# Patient Record
Sex: FEMALE | Race: WHITE | NOT HISPANIC OR LATINO | Employment: OTHER | URBAN - METROPOLITAN AREA
[De-identification: names, ages, dates, MRNs, and addresses within clinical notes are randomized per-mention and may not be internally consistent; named-entity substitution may affect disease eponyms.]

---

## 2019-07-18 ENCOUNTER — OFFICE VISIT (OUTPATIENT)
Dept: OBGYN CLINIC | Facility: CLINIC | Age: 79
End: 2019-07-18
Payer: COMMERCIAL

## 2019-07-18 VITALS
WEIGHT: 155 LBS | DIASTOLIC BLOOD PRESSURE: 82 MMHG | SYSTOLIC BLOOD PRESSURE: 140 MMHG | BODY MASS INDEX: 25.83 KG/M2 | HEIGHT: 65 IN

## 2019-07-18 DIAGNOSIS — N95.2 VAGINAL ATROPHY: ICD-10-CM

## 2019-07-18 DIAGNOSIS — N81.2 SECOND DEGREE UTERINE PROLAPSE: ICD-10-CM

## 2019-07-18 DIAGNOSIS — N81.4 CYSTOCELE WITH PROLAPSE: ICD-10-CM

## 2019-07-18 DIAGNOSIS — Z01.419 ENCOUNTER FOR ANNUAL ROUTINE GYNECOLOGICAL EXAMINATION: Primary | ICD-10-CM

## 2019-07-18 PROCEDURE — 99203 OFFICE O/P NEW LOW 30 MIN: CPT | Performed by: OBSTETRICS & GYNECOLOGY

## 2019-07-18 RX ORDER — FELODIPINE 10 MG/1
10 TABLET, EXTENDED RELEASE ORAL DAILY
Refills: 3 | COMMUNITY
Start: 2019-05-31

## 2019-07-18 RX ORDER — SIMVASTATIN 40 MG
40 TABLET ORAL DAILY
Refills: 3 | COMMUNITY
Start: 2019-04-27

## 2019-07-18 RX ORDER — LISINOPRIL AND HYDROCHLOROTHIAZIDE 25; 20 MG/1; MG/1
1 TABLET ORAL DAILY
Refills: 3 | COMMUNITY
Start: 2019-05-31

## 2019-07-18 NOTE — PROGRESS NOTES
The patient is here for a problem  The patient thinks that her bladder may be dropping  The patient says it began over a year ago  No urinary issues  No bleeding or cramping

## 2019-07-18 NOTE — PROGRESS NOTES
Assessment/Plan:  1  Encounter for annual routine gynecological examination  -normal exam     2  Vaginal atrophy  -continue to monitor     3  Second degree uterine prolapse  -will continue to monitor    -patient to do kegel exercises   -avoid heavy lifting   -consider surgery if symptoms worsen     4  Cystocele with prolapse  -kegel exercises         Subjective:      Patient ID: Ricci Arnett is a 70 y o  female  HPI    70year old  female with history of miscarriage with D&C who presents for feeling like "things have dropped"  She does not recall when the symptoms first started but states its been a while  She said it has worsened over time  She denies any urinary leakage with coughing, laughing etc  She empties her bladder often and drinks water more frequently  She does notice some yellow discharge on panty liner  She denies every doing kegel exercises and has not heard of them  She denies any vaginal bleeding, pelvic pain, dysuria, hematuria, frequency, urgency, abdominal pain, bowel/bladder problems or fever/chills  Patient does not get regular gynecological exams  She denies any prior HRT but was one OCP pills with no side effects years ago  She had  with no complications and babies full term  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  Patient is sexually active but does not have any pain/discomfort  Patient had colonoscopy years ago and does recall when  No family history of colon cancer or polyps  Review of Systems    As seen above     Objective:      /82 (BP Location: Left arm, Patient Position: Sitting, Cuff Size: Standard)   Ht 5' 4 96" (1 65 m)   Wt 70 3 kg (155 lb)   LMP  (LMP Unknown)   BMI 25 82 kg/m²          Physical Exam   Constitutional: She is oriented to person, place, and time  She appears well-developed and well-nourished  HENT:   Head: Normocephalic and atraumatic  Neck: Normal range of motion  Neck supple   No tracheal deviation present  No thyromegaly present  Cardiovascular: Normal rate, regular rhythm and normal heart sounds  Pulmonary/Chest: Effort normal and breath sounds normal  No stridor  No respiratory distress  She has no wheezes  She has no rales  She exhibits no tenderness  Right breast exhibits no inverted nipple, no mass, no nipple discharge, no skin change and no tenderness  Left breast exhibits no inverted nipple, no mass, no nipple discharge, no skin change and no tenderness  No breast swelling, tenderness, discharge or bleeding  Breasts are symmetrical    Abdominal: Soft  Bowel sounds are normal  She exhibits no distension and no mass  There is no tenderness  There is no rebound and no guarding  No hernia  Hernia confirmed negative in the right inguinal area and confirmed negative in the left inguinal area  Genitourinary: Vagina normal and uterus normal  Rectal exam shows no external hemorrhoid, no internal hemorrhoid, no fissure and no mass  No breast swelling, tenderness, discharge or bleeding  No labial fusion  There is no rash, tenderness, lesion or injury on the right labia  There is no rash, tenderness, lesion or injury on the left labia  Uterus is not deviated, not enlarged, not fixed and not tender  Cervix exhibits no motion tenderness, no discharge and no friability  Right adnexum displays no mass, no tenderness and no fullness  Left adnexum displays no mass, no tenderness and no fullness  No erythema, tenderness or bleeding in the vagina  No foreign body in the vagina  No signs of injury around the vagina  No vaginal discharge found  Genitourinary Comments: Second degree uterine prolapse  Cystocele    Lymphadenopathy: No inguinal adenopathy noted on the right or left side  Neurological: She is alert and oriented to person, place, and time  Skin: Skin is warm and dry  Psychiatric: She has a normal mood and affect   Her behavior is normal  Judgment and thought content normal

## 2019-10-23 ENCOUNTER — OFFICE VISIT (OUTPATIENT)
Dept: OBGYN CLINIC | Facility: CLINIC | Age: 79
End: 2019-10-23
Payer: COMMERCIAL

## 2019-10-23 DIAGNOSIS — N81.4 CYSTOCELE WITH UTERINE PROLAPSE: ICD-10-CM

## 2019-10-23 DIAGNOSIS — N95.2 VAGINAL ATROPHY: Primary | ICD-10-CM

## 2019-10-23 DIAGNOSIS — R10.2 PELVIC PRESSURE IN FEMALE: ICD-10-CM

## 2019-10-23 PROCEDURE — 99214 OFFICE O/P EST MOD 30 MIN: CPT | Performed by: OBSTETRICS & GYNECOLOGY

## 2019-10-23 NOTE — PROGRESS NOTES
Returns for evaluation for second degree uterine prolapse diagnosed in 7/2019  Despite regular Kegel exercises, no improvement in symtpoms    Impression:  Second degree uterine prolapse    grade II cystocele    Plan:  Discussed options  Observation vs  vaginal hysterectomy with anterior repair vs  pessary  Patient is not interested in observation or pessary and would like to proceed with surgery  Information on hysterectomy given     Surgery discussed  Consent signed for vaginal hysterectomy anterior repair

## 2019-11-22 ENCOUNTER — PREP FOR PROCEDURE (OUTPATIENT)
Dept: OBGYN CLINIC | Facility: CLINIC | Age: 79
End: 2019-11-22

## 2019-11-22 DIAGNOSIS — N81.10 BADEN-WALKER GRADE 2 CYSTOCELE: ICD-10-CM

## 2019-11-22 DIAGNOSIS — N81.2 SECOND DEGREE UTERINE PROLAPSE: Primary | ICD-10-CM

## 2019-12-27 ENCOUNTER — OFFICE VISIT (OUTPATIENT)
Dept: LAB | Facility: HOSPITAL | Age: 79
End: 2019-12-27
Attending: OBSTETRICS & GYNECOLOGY
Payer: COMMERCIAL

## 2019-12-27 ENCOUNTER — ANESTHESIA EVENT (OUTPATIENT)
Dept: PERIOP | Facility: HOSPITAL | Age: 79
End: 2019-12-27
Payer: COMMERCIAL

## 2019-12-27 DIAGNOSIS — N81.2 SECOND DEGREE UTERINE PROLAPSE: ICD-10-CM

## 2019-12-27 DIAGNOSIS — N81.10 BADEN-WALKER GRADE 2 CYSTOCELE: ICD-10-CM

## 2019-12-27 LAB
ABO GROUP BLD: NORMAL
BASOPHILS # BLD AUTO: 0.03 THOUSANDS/ΜL (ref 0–0.1)
BASOPHILS NFR BLD AUTO: 1 % (ref 0–1)
BLD GP AB SCN SERPL QL: NEGATIVE
EOSINOPHIL # BLD AUTO: 0.07 THOUSAND/ΜL (ref 0–0.61)
EOSINOPHIL NFR BLD AUTO: 1 % (ref 0–6)
ERYTHROCYTE [DISTWIDTH] IN BLOOD BY AUTOMATED COUNT: 13 % (ref 11.6–15.1)
EST. AVERAGE GLUCOSE BLD GHB EST-MCNC: 117 MG/DL
HBA1C MFR BLD: 5.7 % (ref 4.2–6.3)
HCT VFR BLD AUTO: 43.6 % (ref 34.8–46.1)
HGB BLD-MCNC: 14.7 G/DL (ref 11.5–15.4)
IMM GRANULOCYTES # BLD AUTO: 0.02 THOUSAND/UL (ref 0–0.2)
IMM GRANULOCYTES NFR BLD AUTO: 0 % (ref 0–2)
LYMPHOCYTES # BLD AUTO: 1.61 THOUSANDS/ΜL (ref 0.6–4.47)
LYMPHOCYTES NFR BLD AUTO: 28 % (ref 14–44)
MCH RBC QN AUTO: 31.1 PG (ref 26.8–34.3)
MCHC RBC AUTO-ENTMCNC: 33.7 G/DL (ref 31.4–37.4)
MCV RBC AUTO: 92 FL (ref 82–98)
MONOCYTES # BLD AUTO: 0.44 THOUSAND/ΜL (ref 0.17–1.22)
MONOCYTES NFR BLD AUTO: 8 % (ref 4–12)
NEUTROPHILS # BLD AUTO: 3.58 THOUSANDS/ΜL (ref 1.85–7.62)
NEUTS SEG NFR BLD AUTO: 62 % (ref 43–75)
NRBC BLD AUTO-RTO: 0 /100 WBCS
PLATELET # BLD AUTO: 134 THOUSANDS/UL (ref 149–390)
PMV BLD AUTO: 11.3 FL (ref 8.9–12.7)
RBC # BLD AUTO: 4.73 MILLION/UL (ref 3.81–5.12)
RH BLD: POSITIVE
SPECIMEN EXPIRATION DATE: NORMAL
WBC # BLD AUTO: 5.75 THOUSAND/UL (ref 4.31–10.16)

## 2019-12-27 PROCEDURE — 85025 COMPLETE CBC W/AUTO DIFF WBC: CPT

## 2019-12-27 PROCEDURE — 86901 BLOOD TYPING SEROLOGIC RH(D): CPT

## 2019-12-27 PROCEDURE — 86900 BLOOD TYPING SEROLOGIC ABO: CPT

## 2019-12-27 PROCEDURE — 86850 RBC ANTIBODY SCREEN: CPT

## 2019-12-27 PROCEDURE — 93005 ELECTROCARDIOGRAM TRACING: CPT

## 2019-12-27 PROCEDURE — 36415 COLL VENOUS BLD VENIPUNCTURE: CPT

## 2019-12-27 PROCEDURE — 83036 HEMOGLOBIN GLYCOSYLATED A1C: CPT

## 2019-12-27 NOTE — PRE-PROCEDURE INSTRUCTIONS
Pre-Surgery Instructions:   Medication Instructions    felodipine (PLENDIL) 10 MG 24 hr tablet Instructed patient per Anesthesia Guidelines   lisinopril-hydrochlorothiazide (PRINZIDE,ZESTORETIC) 20-25 MG per tablet Instructed patient per Anesthesia Guidelines   Propylene Glycol (SYSTANE BALANCE) 0 6 % SOLN Instructed patient per Anesthesia Guidelines   simvastatin (ZOCOR) 40 mg tablet Instructed patient per Anesthesia Guidelines  Pt verbalized understanding of Ensure carb drinks, shower instructions and IS instructions  Pt instructed to stop NSAIDS and supplements one week prior to surgery  Pt instructed to take plendil only on day of surgery with 1-2 sips of water

## 2019-12-27 NOTE — PRE-PROCEDURE INSTRUCTIONS
Pre-Surgery Instructions:   Medication Instructions    felodipine (PLENDIL) 10 MG 24 hr tablet Instructed patient per Anesthesia Guidelines   lisinopril-hydrochlorothiazide (PRINZIDE,ZESTORETIC) 20-25 MG per tablet Instructed patient per Anesthesia Guidelines   Propylene Glycol (SYSTANE BALANCE) 0 6 % SOLN Instructed patient per Anesthesia Guidelines   simvastatin (ZOCOR) 40 mg tablet Instructed patient per Anesthesia Guidelines

## 2019-12-30 LAB
ATRIAL RATE: 79 BPM
P AXIS: 62 DEGREES
PR INTERVAL: 160 MS
QRS AXIS: -1 DEGREES
QRSD INTERVAL: 78 MS
QT INTERVAL: 382 MS
QTC INTERVAL: 438 MS
T WAVE AXIS: 46 DEGREES
VENTRICULAR RATE: 79 BPM

## 2019-12-30 PROCEDURE — 93010 ELECTROCARDIOGRAM REPORT: CPT | Performed by: INTERNAL MEDICINE

## 2020-01-02 ENCOUNTER — OFFICE VISIT (OUTPATIENT)
Dept: OBGYN CLINIC | Facility: CLINIC | Age: 80
End: 2020-01-02

## 2020-01-02 VITALS
DIASTOLIC BLOOD PRESSURE: 80 MMHG | SYSTOLIC BLOOD PRESSURE: 140 MMHG | WEIGHT: 163 LBS | HEIGHT: 66 IN | BODY MASS INDEX: 26.2 KG/M2

## 2020-01-02 DIAGNOSIS — Z01.818 PREOP EXAMINATION: Primary | ICD-10-CM

## 2020-01-02 PROCEDURE — PREOP: Performed by: OBSTETRICS & GYNECOLOGY

## 2020-01-02 RX ORDER — ESTRADIOL 0.1 MG/G
1 CREAM VAGINAL DAILY
Qty: 45 G | Refills: 0 | Status: SHIPPED | OUTPATIENT
Start: 2020-01-02 | End: 2020-03-02 | Stop reason: ALTCHOICE

## 2020-01-02 NOTE — PROGRESS NOTES
Patient here for preop visit accompanied by her  today  Patient did her hospital preop visit  She had her blood drawn and EKG done  She has a preop clearance visit with her PMD this week  Surgery was explained in detail risks and complications discussed, questions answered  Patient was given a prescription for 1 g estrogen vaginal cream HS for 20 days preop  Impression secondary uterine prolapse with grade 2 cystocele  Pelvic pressure      Plan:  Vaginal hysterectomy, anterior bladder repair

## 2020-01-02 NOTE — PROGRESS NOTES
The patient is here for a pre-op for a TVH scheduled on  1/21/2020  No bleeding or cramping  The patient is not having any issues

## 2020-01-18 PROCEDURE — 99024 POSTOP FOLLOW-UP VISIT: CPT | Performed by: OBSTETRICS & GYNECOLOGY

## 2020-01-18 NOTE — H&P
H&P Exam - Gynecology   Елена Cuba 70 y o  female MRN: [id number]  Unit/Bed#:  Encounter: [id number]      Assessment:  1  Second-degree uterine prolapse 2  Grade 2 cystocele 3  Pelvic pressure     Plan:  1  Vaginal hysterectomy 2  Anterior bladder repair      HPI:  Елена Cuba is a 70 y o   female who presents with increasing pelvic pressure and dyspareunia  She was diagnosed with a second-degree uterine prolapse and a moderate cystocele 2019  At that time she was instructed to perform Kegel's which she did with no results  She was offered a ring pessary but opted for surgical treatment  She denies any bowel problems  She denies any UMESH  Spontaneous vaginal delivery x2 at term  On physical exam the uterus is not enlarged and there are no adnexal masses  Review of Systems   Constitutional: Negative  HENT: Negative  Respiratory: Negative  Cardiovascular: Negative  Gastrointestinal: Negative  Genitourinary:        Pelvic pressure, dyspareunia   Skin: Negative  Allergic/Immunologic: Negative  Neurological: Negative  Psychiatric/Behavioral: Negative  Historical Information   Past Medical History:   Diagnosis Date    Hypertension      Past Surgical History:   Procedure Laterality Date    COLONOSCOPY      DILATION AND CURETTAGE OF UTERUS      WRIST FRACTURE SURGERY Right      OB/GYN History:    X 2    History reviewed  No pertinent family history    Social History   Social History     Substance and Sexual Activity   Alcohol Use Yes    Alcohol/week: 3 0 standard drinks    Types: 3 Glasses of wine per week    Frequency: 2-3 times a week    Drinks per session: 1 or 2    Binge frequency: Never     Social History     Substance and Sexual Activity   Drug Use Not on file     Social History     Tobacco Use   Smoking Status Never Smoker   Smokeless Tobacco Never Used       Meds/Allergies   all current active meds have been reviewed  Allergies   Allergen Reactions    Sulfa Antibiotics        Objective   Vitals: There were no vitals taken for this visit  No intake or output data in the 24 hours ending 01/18/20 0909    Invasive Devices: Invasive Devices     None                 Physical Exam   Constitutional: She appears well-developed  Neck: Normal range of motion  Cardiovascular: Normal rate, regular rhythm, normal heart sounds and intact distal pulses  Pulmonary/Chest: Breath sounds normal    Abdominal: Soft  Bowel sounds are normal    Genitourinary: Uterus normal  No labial fusion  There is no rash, tenderness, lesion or injury on the right labia  There is no rash, tenderness, lesion or injury on the left labia  Genitourinary Comments: Cervix no lesions  Second-degree uterine prolapse  Uterus normal in size  Grade 2 cystocele  No adnexal masses  No rectocele  Musculoskeletal: Normal range of motion  Neurological: She is alert  Skin: Skin is warm  Psychiatric: She has a normal mood and affect  Her behavior is normal  Judgment and thought content normal        Lab Results: I have personally reviewed pertinent reports  Imaging: I have personally reviewed pertinent reports  EKG, Pathology, and Other Studies: I have personally reviewed pertinent reports  Cameron Osei

## 2020-01-20 NOTE — ANESTHESIA PREPROCEDURE EVALUATION
Review of Systems/Medical History  Patient summary reviewed  Chart reviewed  No history of anesthetic complications     Cardiovascular  EKG reviewed, Exercise tolerance (METS): >4,  Hypertension ,    Pulmonary  Negative pulmonary ROS        GI/Hepatic  Negative GI/hepatic ROS          Negative  ROS        Endo/Other  Negative endo/other ROS      GYN      Comment: Uterine prolapse     Hematology  Negative hematology ROS      Musculoskeletal  Negative musculoskeletal ROS        Neurology  Negative neurology ROS      Psychology   Negative psychology ROS              Physical Exam    Airway    Mallampati score: II  TM Distance: >3 FB  Neck ROM: full     Dental   No notable dental hx     Cardiovascular      Pulmonary      Other Findings        Anesthesia Plan  ASA Score- 2     Anesthesia Type- general with ASA Monitors  Additional Monitors:   Airway Plan: ETT  Plan Factors-  Patient did not smoke on day of surgery  Induction- intravenous  Postoperative Plan- Plan for postoperative opioid use  Informed Consent- Anesthetic plan and risks discussed with patient  I personally reviewed this patient with the CRNA  Discussed and agreed on the Anesthesia Plan with the CRNA  Humberto Resendez

## 2020-01-21 ENCOUNTER — ANESTHESIA (OUTPATIENT)
Dept: PERIOP | Facility: HOSPITAL | Age: 80
End: 2020-01-21
Payer: COMMERCIAL

## 2020-01-21 ENCOUNTER — HOSPITAL ENCOUNTER (OUTPATIENT)
Facility: HOSPITAL | Age: 80
Setting detail: OUTPATIENT SURGERY
Discharge: HOME | End: 2020-01-22
Attending: OBSTETRICS & GYNECOLOGY | Admitting: OBSTETRICS & GYNECOLOGY
Payer: COMMERCIAL

## 2020-01-21 DIAGNOSIS — N81.2 SECOND DEGREE UTERINE PROLAPSE: ICD-10-CM

## 2020-01-21 DIAGNOSIS — N81.10 BADEN-WALKER GRADE 2 CYSTOCELE: ICD-10-CM

## 2020-01-21 PROBLEM — Z90.710 STATUS POST VAGINAL HYSTERECTOMY: Status: ACTIVE | Noted: 2020-01-21

## 2020-01-21 LAB
ABO GROUP BLD: NORMAL
ANION GAP SERPL CALCULATED.3IONS-SCNC: 6 MMOL/L (ref 4–13)
BUN SERPL-MCNC: 17 MG/DL (ref 5–25)
CALCIUM SERPL-MCNC: 9 MG/DL (ref 8.3–10.1)
CHLORIDE SERPL-SCNC: 105 MMOL/L (ref 100–108)
CO2 SERPL-SCNC: 29 MMOL/L (ref 21–32)
CREAT SERPL-MCNC: 1.11 MG/DL (ref 0.6–1.3)
GFR SERPL CREATININE-BSD FRML MDRD: 48 ML/MIN/1.73SQ M
GLUCOSE P FAST SERPL-MCNC: 198 MG/DL (ref 65–99)
GLUCOSE SERPL-MCNC: 106 MG/DL (ref 65–140)
GLUCOSE SERPL-MCNC: 198 MG/DL (ref 65–140)
POTASSIUM SERPL-SCNC: 3.1 MMOL/L (ref 3.5–5.3)
RH BLD: POSITIVE
SODIUM SERPL-SCNC: 140 MMOL/L (ref 136–145)

## 2020-01-21 PROCEDURE — 86901 BLOOD TYPING SEROLOGIC RH(D): CPT | Performed by: OBSTETRICS & GYNECOLOGY

## 2020-01-21 PROCEDURE — 88305 TISSUE EXAM BY PATHOLOGIST: CPT | Performed by: PATHOLOGY

## 2020-01-21 PROCEDURE — 82948 REAGENT STRIP/BLOOD GLUCOSE: CPT

## 2020-01-21 PROCEDURE — 86900 BLOOD TYPING SEROLOGIC ABO: CPT | Performed by: OBSTETRICS & GYNECOLOGY

## 2020-01-21 PROCEDURE — 80048 BASIC METABOLIC PNL TOTAL CA: CPT | Performed by: STUDENT IN AN ORGANIZED HEALTH CARE EDUCATION/TRAINING PROGRAM

## 2020-01-21 PROCEDURE — 58262 VAG HYST INCLUDING T/O: CPT | Performed by: OBSTETRICS & GYNECOLOGY

## 2020-01-21 PROCEDURE — 57240 ANTERIOR COLPORRHAPHY: CPT | Performed by: OBSTETRICS & GYNECOLOGY

## 2020-01-21 RX ORDER — ONDANSETRON 2 MG/ML
INJECTION INTRAMUSCULAR; INTRAVENOUS AS NEEDED
Status: DISCONTINUED | OUTPATIENT
Start: 2020-01-21 | End: 2020-01-21 | Stop reason: SURG

## 2020-01-21 RX ORDER — OXYCODONE HYDROCHLORIDE 5 MG/1
5 TABLET ORAL EVERY 4 HOURS PRN
Status: DISCONTINUED | OUTPATIENT
Start: 2020-01-21 | End: 2020-01-22

## 2020-01-21 RX ORDER — ACETAMINOPHEN 325 MG/1
975 TABLET ORAL ONCE
Status: COMPLETED | OUTPATIENT
Start: 2020-01-21 | End: 2020-01-21

## 2020-01-21 RX ORDER — PROPOFOL 10 MG/ML
INJECTION, EMULSION INTRAVENOUS AS NEEDED
Status: DISCONTINUED | OUTPATIENT
Start: 2020-01-21 | End: 2020-01-21 | Stop reason: SURG

## 2020-01-21 RX ORDER — EPHEDRINE SULFATE 50 MG/ML
INJECTION INTRAVENOUS AS NEEDED
Status: DISCONTINUED | OUTPATIENT
Start: 2020-01-21 | End: 2020-01-21 | Stop reason: SURG

## 2020-01-21 RX ORDER — LIDOCAINE HYDROCHLORIDE 10 MG/ML
0.5 INJECTION, SOLUTION EPIDURAL; INFILTRATION; INTRACAUDAL; PERINEURAL ONCE AS NEEDED
Status: DISCONTINUED | OUTPATIENT
Start: 2020-01-21 | End: 2020-01-21 | Stop reason: HOSPADM

## 2020-01-21 RX ORDER — FENTANYL CITRATE/PF 50 MCG/ML
25 SYRINGE (ML) INJECTION
Status: DISCONTINUED | OUTPATIENT
Start: 2020-01-21 | End: 2020-01-21 | Stop reason: HOSPADM

## 2020-01-21 RX ORDER — OXYCODONE HYDROCHLORIDE 10 MG/1
10 TABLET ORAL EVERY 4 HOURS PRN
Status: DISCONTINUED | OUTPATIENT
Start: 2020-01-21 | End: 2020-01-22

## 2020-01-21 RX ORDER — FELODIPINE 5 MG/1
10 TABLET, EXTENDED RELEASE ORAL DAILY
Status: DISCONTINUED | OUTPATIENT
Start: 2020-01-22 | End: 2020-01-22 | Stop reason: HOSPADM

## 2020-01-21 RX ORDER — KETAMINE HYDROCHLORIDE 50 MG/ML
INJECTION, SOLUTION, CONCENTRATE INTRAMUSCULAR; INTRAVENOUS AS NEEDED
Status: DISCONTINUED | OUTPATIENT
Start: 2020-01-21 | End: 2020-01-21 | Stop reason: SURG

## 2020-01-21 RX ORDER — GABAPENTIN 100 MG/1
100 CAPSULE ORAL ONCE
Status: COMPLETED | OUTPATIENT
Start: 2020-01-21 | End: 2020-01-21

## 2020-01-21 RX ORDER — SODIUM CHLORIDE, SODIUM LACTATE, POTASSIUM CHLORIDE, CALCIUM CHLORIDE 600; 310; 30; 20 MG/100ML; MG/100ML; MG/100ML; MG/100ML
125 INJECTION, SOLUTION INTRAVENOUS CONTINUOUS
Status: DISCONTINUED | OUTPATIENT
Start: 2020-01-21 | End: 2020-01-22

## 2020-01-21 RX ORDER — ONDANSETRON 2 MG/ML
4 INJECTION INTRAMUSCULAR; INTRAVENOUS ONCE AS NEEDED
Status: DISCONTINUED | OUTPATIENT
Start: 2020-01-21 | End: 2020-01-21 | Stop reason: HOSPADM

## 2020-01-21 RX ORDER — METRONIDAZOLE 7.5 MG/G
GEL VAGINAL AS NEEDED
Status: DISCONTINUED | OUTPATIENT
Start: 2020-01-21 | End: 2020-01-21 | Stop reason: HOSPADM

## 2020-01-21 RX ORDER — POTASSIUM CHLORIDE 14.9 MG/ML
INJECTION INTRAVENOUS CONTINUOUS PRN
Status: DISCONTINUED | OUTPATIENT
Start: 2020-01-21 | End: 2020-01-21 | Stop reason: SURG

## 2020-01-21 RX ORDER — NEOSTIGMINE METHYLSULFATE 1 MG/ML
INJECTION INTRAVENOUS AS NEEDED
Status: DISCONTINUED | OUTPATIENT
Start: 2020-01-21 | End: 2020-01-21 | Stop reason: SURG

## 2020-01-21 RX ORDER — MIDAZOLAM HYDROCHLORIDE 2 MG/2ML
INJECTION, SOLUTION INTRAMUSCULAR; INTRAVENOUS AS NEEDED
Status: DISCONTINUED | OUTPATIENT
Start: 2020-01-21 | End: 2020-01-21 | Stop reason: SURG

## 2020-01-21 RX ORDER — ACETAMINOPHEN 325 MG/1
650 TABLET ORAL EVERY 6 HOURS PRN
Status: DISCONTINUED | OUTPATIENT
Start: 2020-01-21 | End: 2020-01-22 | Stop reason: HOSPADM

## 2020-01-21 RX ORDER — HYDROMORPHONE HCL/PF 1 MG/ML
0.4 SYRINGE (ML) INJECTION
Status: DISCONTINUED | OUTPATIENT
Start: 2020-01-21 | End: 2020-01-21 | Stop reason: HOSPADM

## 2020-01-21 RX ORDER — DEXAMETHASONE SODIUM PHOSPHATE 10 MG/ML
INJECTION, SOLUTION INTRAMUSCULAR; INTRAVENOUS AS NEEDED
Status: DISCONTINUED | OUTPATIENT
Start: 2020-01-21 | End: 2020-01-21 | Stop reason: SURG

## 2020-01-21 RX ORDER — LIDOCAINE HYDROCHLORIDE 10 MG/ML
INJECTION, SOLUTION EPIDURAL; INFILTRATION; INTRACAUDAL; PERINEURAL AS NEEDED
Status: DISCONTINUED | OUTPATIENT
Start: 2020-01-21 | End: 2020-01-21 | Stop reason: SURG

## 2020-01-21 RX ORDER — PRAVASTATIN SODIUM 80 MG/1
80 TABLET ORAL
Status: DISCONTINUED | OUTPATIENT
Start: 2020-01-21 | End: 2020-01-22 | Stop reason: HOSPADM

## 2020-01-21 RX ORDER — SODIUM CHLORIDE, SODIUM LACTATE, POTASSIUM CHLORIDE, CALCIUM CHLORIDE 600; 310; 30; 20 MG/100ML; MG/100ML; MG/100ML; MG/100ML
50 INJECTION, SOLUTION INTRAVENOUS CONTINUOUS
Status: DISCONTINUED | OUTPATIENT
Start: 2020-01-21 | End: 2020-01-21 | Stop reason: SDUPTHER

## 2020-01-21 RX ORDER — ONDANSETRON 2 MG/ML
4 INJECTION INTRAMUSCULAR; INTRAVENOUS EVERY 6 HOURS PRN
Status: DISCONTINUED | OUTPATIENT
Start: 2020-01-21 | End: 2020-01-22 | Stop reason: HOSPADM

## 2020-01-21 RX ORDER — ROCURONIUM BROMIDE 10 MG/ML
INJECTION, SOLUTION INTRAVENOUS AS NEEDED
Status: DISCONTINUED | OUTPATIENT
Start: 2020-01-21 | End: 2020-01-21 | Stop reason: SURG

## 2020-01-21 RX ORDER — GLYCOPYRROLATE 0.2 MG/ML
INJECTION INTRAMUSCULAR; INTRAVENOUS AS NEEDED
Status: DISCONTINUED | OUTPATIENT
Start: 2020-01-21 | End: 2020-01-21 | Stop reason: SURG

## 2020-01-21 RX ORDER — CEFAZOLIN SODIUM 2 G/50ML
2000 SOLUTION INTRAVENOUS ONCE
Status: COMPLETED | OUTPATIENT
Start: 2020-01-21 | End: 2020-01-21

## 2020-01-21 RX ADMIN — LIDOCAINE HYDROCHLORIDE 50 MG: 10 INJECTION, SOLUTION EPIDURAL; INFILTRATION; INTRACAUDAL; PERINEURAL at 08:03

## 2020-01-21 RX ADMIN — PHENYLEPHRINE HYDROCHLORIDE 150 MCG: 10 INJECTION INTRAVENOUS at 08:03

## 2020-01-21 RX ADMIN — ROCURONIUM BROMIDE 50 MG: 50 INJECTION, SOLUTION INTRAVENOUS at 08:03

## 2020-01-21 RX ADMIN — SODIUM CHLORIDE, SODIUM LACTATE, POTASSIUM CHLORIDE, AND CALCIUM CHLORIDE: .6; .31; .03; .02 INJECTION, SOLUTION INTRAVENOUS at 07:33

## 2020-01-21 RX ADMIN — GLYCOPYRROLATE 0.4 MG: 0.2 INJECTION, SOLUTION INTRAMUSCULAR; INTRAVENOUS at 09:45

## 2020-01-21 RX ADMIN — PHENYLEPHRINE HYDROCHLORIDE 50 MCG: 10 INJECTION INTRAVENOUS at 08:18

## 2020-01-21 RX ADMIN — ONDANSETRON 4 MG: 2 INJECTION INTRAMUSCULAR; INTRAVENOUS at 09:45

## 2020-01-21 RX ADMIN — KETAMINE HYDROCHLORIDE 50 MG: 50 INJECTION, SOLUTION INTRAMUSCULAR; INTRAVENOUS at 08:03

## 2020-01-21 RX ADMIN — ACETAMINOPHEN 975 MG: 325 TABLET ORAL at 06:46

## 2020-01-21 RX ADMIN — POTASSIUM CHLORIDE: 14.9 INJECTION, SOLUTION INTRAVENOUS at 08:18

## 2020-01-21 RX ADMIN — GLYCOPYRROLATE 0.1 MG: 0.2 INJECTION, SOLUTION INTRAMUSCULAR; INTRAVENOUS at 07:56

## 2020-01-21 RX ADMIN — CEFAZOLIN SODIUM 2000 MG: 2 SOLUTION INTRAVENOUS at 08:10

## 2020-01-21 RX ADMIN — ONDANSETRON 4 MG: 2 INJECTION INTRAMUSCULAR; INTRAVENOUS at 07:56

## 2020-01-21 RX ADMIN — DEXAMETHASONE SODIUM PHOSPHATE 10 MG: 10 INJECTION, SOLUTION INTRAMUSCULAR; INTRAVENOUS at 08:30

## 2020-01-21 RX ADMIN — PHENYLEPHRINE HYDROCHLORIDE 50 MCG: 10 INJECTION INTRAVENOUS at 08:22

## 2020-01-21 RX ADMIN — PHENYLEPHRINE HYDROCHLORIDE 100 MCG: 10 INJECTION INTRAVENOUS at 09:30

## 2020-01-21 RX ADMIN — EPHEDRINE SULFATE 10 MG: 50 INJECTION, SOLUTION INTRAVENOUS at 08:42

## 2020-01-21 RX ADMIN — EPHEDRINE SULFATE 10 MG: 50 INJECTION, SOLUTION INTRAVENOUS at 08:59

## 2020-01-21 RX ADMIN — NEOSTIGMINE METHYLSULFATE 4 MG: 1 INJECTION INTRAVENOUS at 09:45

## 2020-01-21 RX ADMIN — FENTANYL CITRATE 25 MCG: 50 INJECTION, SOLUTION INTRAMUSCULAR; INTRAVENOUS at 10:37

## 2020-01-21 RX ADMIN — EPHEDRINE SULFATE 10 MG: 50 INJECTION, SOLUTION INTRAVENOUS at 09:06

## 2020-01-21 RX ADMIN — PRAVASTATIN SODIUM 80 MG: 80 TABLET ORAL at 17:08

## 2020-01-21 RX ADMIN — SODIUM CHLORIDE, SODIUM LACTATE, POTASSIUM CHLORIDE, AND CALCIUM CHLORIDE 125 ML/HR: .6; .31; .03; .02 INJECTION, SOLUTION INTRAVENOUS at 21:34

## 2020-01-21 RX ADMIN — FENTANYL CITRATE 25 MCG: 50 INJECTION, SOLUTION INTRAMUSCULAR; INTRAVENOUS at 10:25

## 2020-01-21 RX ADMIN — GABAPENTIN 100 MG: 100 CAPSULE ORAL at 06:45

## 2020-01-21 RX ADMIN — EPHEDRINE SULFATE 10 MG: 50 INJECTION, SOLUTION INTRAVENOUS at 08:29

## 2020-01-21 RX ADMIN — PROPOFOL 150 MG: 10 INJECTION, EMULSION INTRAVENOUS at 08:03

## 2020-01-21 RX ADMIN — SODIUM CHLORIDE, SODIUM LACTATE, POTASSIUM CHLORIDE, AND CALCIUM CHLORIDE 125 ML/HR: .6; .31; .03; .02 INJECTION, SOLUTION INTRAVENOUS at 12:21

## 2020-01-21 RX ADMIN — MIDAZOLAM 2 MG: 1 INJECTION INTRAMUSCULAR; INTRAVENOUS at 07:56

## 2020-01-21 NOTE — OP NOTE
OPERATIVE REPORT  PATIENT NAME: Ester Shipman    :  1940  MRN: [id number]  Pt Location: [facility] OR ROOM 05    SURGERY DATE: 2020    Surgeon(s) and Role:     * Mechelle Thomason MD - Primary     * Emily Villegas MD - Assisting    Preop Diagnosis:  Second degree uterine prolapse [N81 2]  [place]-Walker grade 2 cystocele [N81 10]    Post-Op Diagnosis Codes:     * Second degree uterine prolapse [N81 2]     * [place]-Walker grade 2 cystocele [N81 10]    Procedure(s) (LRB):  HYSTERECTOMY VAGINAL TOTAL (TVH)   LEFT SALPINGECTOMY (N/A)  ANTERIORCOLPORRHAPHY (N/A)    Specimen(s):  ID Type Source Tests Collected by Time Destination   1 : left  fallopian tube and uterus Tissue Other TISSUE EXAM Mechelle Thomason MD 2020 0902        Estimated Blood Loss:   75 mL    Drains:  Urethral Catheter Non-latex (Active)   Number of days: 0       Anesthesia Type:   General    Operative Indications:  Second degree uterine prolapse [N81 2]  [place]-Walker grade 2 cystocele [N81 10]    Operative Findings:  Normal appearing external female genitalia, mild atrophy noted  Mildly atrophic vaginal mucosa  Grade 2 anterior vaginal prolapse  Small, parous, grossly normal appearing cervix  Small, anteverted, mobile uterus  No palpable adnexal masses or fullness    Complications:   None    Procedure and Technique:  Barak Valencia identified in the preoperative area  The procedure was reviewed with the patient again  She was taken to the operative suite and was appropriately identified  After successful induction of general endotracheal anesthesia the patient was placed in the dorsal lithotomy position using yellowfin stirrups  Pneumatic compression boots were placed bilaterally  She received Ancef 2 g intravenous for prophylaxis  Operative timeout was performed to verify correct patient and procedure  Exam under anesthesia revealed grade 1 anterior vaginal wall prolapse    She was prepped with chlorhexidine on the vagina & perineum  She was draped in the usual sterile fashion  A Hdz catheter was aseptically placed  A weighted speculum was placed vaginally and the cervix was then grasped with 2 double-tooth tenaculums  The cervicovaginal junction was then incised using scalpel  The posterior cul-de-sac was entered sharply  The peritoneum was then tacked to the posterior vaginal cuff using an 0 Vicryl suture  Weighted speculum was placed into the posterior cul-de-sac  The [employer] Device was used to take serial bites of the paracervical and parametrial tissue  Hemostasis was noted to be adequate  The anterior cul-de-sac was then entered bluntly  Dissection was further carried towards the fundus of the uterus with the XL 1 device  The left fallopian tube was identified and removed using the device  The fallopian tube could not be easily identified  The uterus was then disconnected from its supportive structures  The specimen consisting of the uterus,cervix and left fallopian tube was then handed off and sent to pathology  Attention was turned to the anterior wall where the persistent cystocele was note  Two Allis clamps were applied vertically along the midline to grasp the dependent portion of the cystocele for traction  A midline anterior vaginal wall incision was made to split the vaginal epithelium from the underlying muscularis  This incision was extended to the level of the urethrovesical junction distally and the cuff proximally  The epithelium was further  from the vaginal muscularis sharply with tenotomy scissors and bluntly dissection  The vaginal wall was then plicated in a horizontal imbricating fashion using PDS suture  The epithelium was trimmed  The incision was closed with 2-0 Chomic in a running fashion  The vaginal cuff was then reapproximated with figure-of-eight stitches with 0 Vicryl suture  Hemostasis was adequate  The vagina was packed with approximately 2 feet of packing  Anesthesia was reversed without difficulty and the patient was taken to PACU in stable condition       Patient Disposition:  PACU     SIGNATURE: Cici Denney MD  DATE: January 21, 2020  TIME: 10:05 AM

## 2020-01-21 NOTE — PROGRESS NOTES
Progress Note - OB/GYN   Brendan Arreola 70 y o  female MRN: [id number]  Unit/Bed#: [facility] 921-01 Encounter: [id number]    Assessment:  Postop Day #0 s/p vaginal hysterectomy, stable    Plan:  1) Postoperative care   Hgb 14 7g/dL --> f/u AM CBC and BMP   Urinary output 200xx, vargas in   Encourage ambulation   Encourage breastfeeding   Anticipate discharge tomorrow    Subjective/Objective   Chief Complaint:     Post op  Patient is feeling well  Vaginal packing in  Pain well controlled      Subjective:     Pain: yes, incisional, improved with meds  Tolerating PO: yes  Voiding: yes  Flatus: no  BM: no  Ambulating: yes  Chest pain: no  Shortness of breath: no  Leg pain: no      Objective:     Vitals: /69   Pulse 93   Temp (!) 97 3 °F (36 3 °C)   Resp 20   Ht 5' 5" (1 651 m)   Wt 71 2 kg (157 lb)   LMP  (LMP Unknown)   SpO2 96%   BMI 26 13 kg/m²       Intake/Output Summary (Last 24 hours) at 1/21/2020 1702  Last data filed at 1/21/2020 1655  Gross per 24 hour   Intake 1602 08 ml   Output 1325 ml   Net 277 08 ml       Physical Exam:     General: AAOx3, NAD  Cardiovascular: CV, RRR  Respiratory: CTA b/l, no WRR  Abdomen: soft, non-tender, non-distended, no rebound or guarding, no CVA tenderness      Lab Results   Component Value Date    WBC 5 75 12/27/2019    HGB 14 7 12/27/2019    HCT 43 6 12/27/2019    MCV 92 12/27/2019     (L) 12/27/2019         Carito Cerda MD  9/65/9268  3:50 PM

## 2020-01-21 NOTE — ANESTHESIA POSTPROCEDURE EVALUATION
Post-Op Assessment Note    CV Status:  Stable  Pain Score: 0    Pain management: adequate     Mental Status:  Awake   Hydration Status:  Stable   PONV Controlled:  None   Airway Patency:  Patent   Post Op Vitals Reviewed: Yes      Staff: CRNA   Comments: Pt  to Pacu  Report given  Course uneventful  VSS  Airway patent  Neuro  intact            BP (!) 99/48 (01/21/20 1004)    Temp (!) 97 °F (36 1 °C) (01/21/20 1000)    Pulse 80 (01/21/20 1004)   Resp 16 (01/21/20 1004)    SpO2 99 % (01/21/20 1004)

## 2020-01-21 NOTE — INTERVAL H&P NOTE
H&P reviewed  After examining the patient I find no changes in the patients condition since the H&P had been written      Vitals:    01/21/20 0611   BP: 140/66   Pulse: 86   Resp: 16   Temp: 97 9 °F (36 6 °C)   SpO2: 97%

## 2020-01-22 VITALS
WEIGHT: 157 LBS | RESPIRATION RATE: 18 BRPM | OXYGEN SATURATION: 94 % | BODY MASS INDEX: 26.16 KG/M2 | SYSTOLIC BLOOD PRESSURE: 136 MMHG | DIASTOLIC BLOOD PRESSURE: 68 MMHG | HEIGHT: 65 IN | TEMPERATURE: 97.7 F | HEART RATE: 97 BPM

## 2020-01-22 LAB
ANION GAP SERPL CALCULATED.3IONS-SCNC: 4 MMOL/L (ref 4–13)
BASOPHILS # BLD AUTO: 0.02 THOUSANDS/ΜL (ref 0–0.1)
BASOPHILS NFR BLD AUTO: 0 % (ref 0–1)
BUN SERPL-MCNC: 11 MG/DL (ref 5–25)
CALCIUM SERPL-MCNC: 9.1 MG/DL (ref 8.3–10.1)
CHLORIDE SERPL-SCNC: 111 MMOL/L (ref 100–108)
CO2 SERPL-SCNC: 28 MMOL/L (ref 21–32)
CREAT SERPL-MCNC: 0.93 MG/DL (ref 0.6–1.3)
EOSINOPHIL # BLD AUTO: 0 THOUSAND/ΜL (ref 0–0.61)
EOSINOPHIL NFR BLD AUTO: 0 % (ref 0–6)
ERYTHROCYTE [DISTWIDTH] IN BLOOD BY AUTOMATED COUNT: 13.2 % (ref 11.6–15.1)
GFR SERPL CREATININE-BSD FRML MDRD: 59 ML/MIN/1.73SQ M
GLUCOSE SERPL-MCNC: 109 MG/DL (ref 65–140)
HCT VFR BLD AUTO: 37.9 % (ref 34.8–46.1)
HGB BLD-MCNC: 12.6 G/DL (ref 11.5–15.4)
IMM GRANULOCYTES # BLD AUTO: 0.07 THOUSAND/UL (ref 0–0.2)
IMM GRANULOCYTES NFR BLD AUTO: 0 % (ref 0–2)
LYMPHOCYTES # BLD AUTO: 1.72 THOUSANDS/ΜL (ref 0.6–4.47)
LYMPHOCYTES NFR BLD AUTO: 11 % (ref 14–44)
MCH RBC QN AUTO: 30.7 PG (ref 26.8–34.3)
MCHC RBC AUTO-ENTMCNC: 33.2 G/DL (ref 31.4–37.4)
MCV RBC AUTO: 92 FL (ref 82–98)
MONOCYTES # BLD AUTO: 1.37 THOUSAND/ΜL (ref 0.17–1.22)
MONOCYTES NFR BLD AUTO: 9 % (ref 4–12)
NEUTROPHILS # BLD AUTO: 12.52 THOUSANDS/ΜL (ref 1.85–7.62)
NEUTS SEG NFR BLD AUTO: 80 % (ref 43–75)
NRBC BLD AUTO-RTO: 0 /100 WBCS
PLATELET # BLD AUTO: 140 THOUSANDS/UL (ref 149–390)
PMV BLD AUTO: 11 FL (ref 8.9–12.7)
POTASSIUM SERPL-SCNC: 3.7 MMOL/L (ref 3.5–5.3)
RBC # BLD AUTO: 4.11 MILLION/UL (ref 3.81–5.12)
SODIUM SERPL-SCNC: 143 MMOL/L (ref 136–145)
WBC # BLD AUTO: 15.7 THOUSAND/UL (ref 4.31–10.16)

## 2020-01-22 PROCEDURE — 85025 COMPLETE CBC W/AUTO DIFF WBC: CPT | Performed by: OBSTETRICS & GYNECOLOGY

## 2020-01-22 PROCEDURE — 80048 BASIC METABOLIC PNL TOTAL CA: CPT | Performed by: OBSTETRICS & GYNECOLOGY

## 2020-01-22 PROCEDURE — 99024 POSTOP FOLLOW-UP VISIT: CPT | Performed by: OBSTETRICS & GYNECOLOGY

## 2020-01-22 PROCEDURE — 90662 IIV NO PRSV INCREASED AG IM: CPT | Performed by: OBSTETRICS & GYNECOLOGY

## 2020-01-22 PROCEDURE — G0008 ADMIN INFLUENZA VIRUS VAC: HCPCS | Performed by: OBSTETRICS & GYNECOLOGY

## 2020-01-22 RX ADMIN — INFLUENZA A VIRUS A/MICHIGAN/45/2015 X-275 (H1N1) ANTIGEN (FORMALDEHYDE INACTIVATED), INFLUENZA A VIRUS A/SINGAPORE/INFIMH-16-0019/2016 IVR-186 (H3N2) ANTIGEN (FORMALDEHYDE INACTIVATED), AND INFLUENZA B VIRUS B/MARYLAND/15/2016 BX-69A (A B/COLORADO/6/2017-LIKE VIRUS) ANTIGEN (FORMALDEHYDE INACTIVATED) 0.5 ML: 60; 60; 60 INJECTION, SUSPENSION INTRAMUSCULAR at 09:13

## 2020-01-22 RX ADMIN — FELODIPINE 10 MG: 5 TABLET, FILM COATED, EXTENDED RELEASE ORAL at 09:13

## 2020-01-22 RX ADMIN — LISINOPRIL: 20 TABLET ORAL at 09:13

## 2020-01-22 NOTE — PROGRESS NOTES
2am:  Pt attempted to get OOB, bed alarm alarming  Pt pulled out IV  Pt confused upset with staff and refusing to get back into bed  After several attempts, pt did get back into bed  Instructed pt to call for assistance  3:10am:  Pt attempted to get OOB again and removed 2nd IV  Pt insistant staff was "not on her side" and pt continued to be rude towards staff demanding to talk to her  and that staff is keeping her  away from her  Called  Hernan Zee, and explained incidents  Called  from the room again for pt to talk to   Pt yelled at  on the phone then hung up  Pt assisted back to bed  4am Paged 3x, OB on call doctor, Laborist, and he said to call Dr Debora Garcia  Paged Dr Debora Garcia, awaiting response  4:15am  and son arrived  Pt calmer  6am:  Reported to Dr Christal Rutherford regarding overnight events  7am:  Reported to Dr Margarita Cardenas of overnight events as well

## 2020-01-22 NOTE — DISCHARGE INSTRUCTIONS
Vaginal Hysterectomy   WHAT YOU SHOULD KNOW:   A vaginal hysterectomy is surgery to remove your uterus through your vagina  Other organs, such as your ovaries and fallopian tubes, may also be removed  AFTER YOU LEAVE:   Medicines:   · Anticoagulants    are a type of blood thinner medicine that helps prevent clots  Clots can cause strokes, heart attacks, and death  These medicines may cause you to bleed or bruise more easily  ¨ Watch for bleeding from your gums or nose  Watch for blood in your urine and bowel movements  Use a soft washcloth and a soft toothbrush  If you shave, use an electric razor  Avoid activities that can cause bruising or bleeding  ¨ Tell your healthcare provider about all medicines you take because many medicines cannot be used with anticoagulants  Do not start or stop any medicines unless your healthcare provider tells you to  Tell your dentist and other healthcare providers that you take anticoagulants  Wear a bracelet or necklace that says you take this medicine  ¨ You will need regular blood tests so your healthcare provider can decide how much medicine you need  Take anticoagulants exactly as directed  Tell your healthcare provider right away if you forget to take the medicine, or if you take too much  ¨ If you take warfarin, some foods can change how your blood clots  Do not make major changes to your diet while you take warfarin  Warfarin works best when you eat about the same amount of vitamin K every day  Vitamin K is found in green leafy vegetables, broccoli, grapes, and other foods  Ask for more information about what to eat when you take warfarin  · Prescription pain medicine  may be given  Ask your PHP how to take this medicine safely  · Antibiotics  help treat or prevent a bacterial infection  · Take your medicine as directed  Contact your PHP if you think your medicine is not helping or if you have side effects   Tell him if you are allergic to any medicine  Keep a list of the medicines, vitamins, and herbs you take  Include the amounts, and when and why you take them  Bring the list or the pill bottles to follow-up visits  Carry your medicine list with you in case of an emergency  Follow up with your PHP or gynecologist as directed: You may need to return for blood tests, ultrasound, CT scan, or other tests  Write down your questions so you remember to ask them during your visits  Rest as needed: You may feel like resting more after surgery  Slowly start to do more each day  Ask when you can return to your usual activities  Contact your PHP or gynecologist if:   · You have heavy vaginal bleeding that fills 1 or more sanitary pads in 1 hour  · You have a fever  · You feel pain when you urinate, or you have trouble urinating  · You feel pain during sex  · You feel pain or fullness in your vagina  · You feel like something is sticking out of your vagina  · You have questions or concerns about your condition or care  Seek care immediately or call 911 if:   · Your arm or leg feels warm, tender, and painful  It may look swollen and red  · You feel lightheaded, short of breath, and have chest pain  · You cough up blood  © 2014 [address] is for End User's use only and may not be sold, redistributed or otherwise used for commercial purposes  All illustrations and images included in CareNotes® are the copyrighted property of [employer]  or [employer]iago Monsalve  The above information is an  only  It is not intended as medical advice for individual conditions or treatments  Talk to your doctor, nurse or pharmacist before following any medical regimen to see if it is safe and effective for you  Anterior Vaginal Repair   WHAT YOU NEED TO KNOW:   An anterior vaginal repair is a procedure to lift or tighten the front vaginal wall  This can help prevent you from leaking urine   The procedure is also called an anterior colporrhaphy  DISCHARGE INSTRUCTIONS:   Medicines:   · Pain medicine: You may need medicine to take away or decrease pain  ¨ Learn how to take your medicine  Ask what medicine and how much you should take  Be sure you know how, when, and how often to take it  ¨ Do not wait until the pain is severe before you take your medicine  Tell caregivers if your pain does not decrease  ¨ Pain medicine can make you dizzy or sleepy  Prevent falls by calling someone when you get out of bed or if you need help  · Antibiotics: This medicine is given to fight or prevent an infection caused by bacteria  Always take your antibiotics exactly as ordered by your healthcare provider  Do not stop taking your medicine unless directed by your healthcare provider  Never save antibiotics or take leftover antibiotics that were given to you for another illness  · Take your medicine as directed  Contact your healthcare provider if you think your medicine is not helping or if you have side effects  Tell him or her if you are allergic to any medicine  Keep a list of the medicines, vitamins, and herbs you take  Include the amounts, and when and why you take them  Bring the list or the pill bottles to follow-up visits  Carry your medicine list with you in case of an emergency  Follow up with your healthcare provider as directed:  Write down your questions so you remember to ask them during your visits  Self-care:   · Sex:  Do not have sex until your healthcare provider says it is okay  · Kegel exercises: To do kegel exercises, squeeze your pelvic floor muscles for 5 to 10 seconds, then release  Regular kegel exercises will help your pelvic floor muscles become stronger  This will help prevent you from leaking urine  Ask your healthcare provider when to start these exercises and how often to do them  · Sanitary pad:  Change your sanitary pad regularly   Keep track of how often you change the pad     · Hdz catheter:  Keep the bag below your waist  This will help prevent infection and other problems caused by urine flowing back into your bladder  Do not pull on the catheter because this can cause pain and bleeding, and the catheter could come out  Keep the catheter tubing free of kinks so your urine will flow into the bag  Your healthcare provider will remove the catheter as soon as possible, to help prevent infection  · Wound care:  When you are allowed to bathe or shower, carefully wash your vaginal area with soap and water  · Do not put pressure on your abdomen: This will help prevent damage to your surgery area  Do not strain, lift heavy objects, or stand for a very long time  Do not perform strenuous exercises, such as running and weight lifting  · Activity:  You may need to start walking within a few days after your procedure  Ask your healthcare provider when to start and how long you should walk  Ask about any other exercises that may be right for you  · Support socks: You may need to wear support socks  These are tight socks that help increase the circulation in your legs until you are more active  This helps prevent blood clots  Contact your healthcare provider if:   · You soak a sanitary pad with blood every hour for 4 hours  · You have vaginal pain that does not go away even after you take pain medicine  · You have pus or a foul-smelling discharge from your genital area  · You see blood in your urine  · You have pain during sex  · You have a fever, chills, a cough, or feel weak and achy  · You have nausea and vomiting  · You have questions or concerns about your condition or care  Seek care immediately or call 911 if:   · You feel something is bulging out into your vagina or rectum and not going back in     · You cannot urinate  · Your arm or leg feels warm, tender, and painful  It may look swollen and red      · You suddenly feel lightheaded and short of breath  · You have chest pain  You may have more pain when you take a deep breath or cough  You may cough up blood  © 2017 [address] Information is for End User's use only and may not be sold, redistributed or otherwise used for commercial purposes  All illustrations and images included in CareNotes® are the copyrighted property of [employer]  or [employer]  The above information is an  only  It is not intended as medical advice for individual conditions or treatments  Talk to your doctor, nurse or pharmacist before following any medical regimen to see if it is safe and effective for you

## 2020-01-22 NOTE — PROGRESS NOTES
Gynecology Progress note   Alicia Mccain 70 y o  female MRN: [id number]  Unit/Bed#: [facility] 921-01 Encounter: [id number]    Alicia Mccain reports feeling well this morning  Overnight around 2AM the nurse reports that the patient was "belligerent, yelling, pulling over her IV, trying to pull of her catheter " She refused new IV  The family came into the hospital during this time and was able to calm down the patient  The  reports that this does not typically happen at home, "she just needs to be at home " The patient's remembrance of the episode is that "there were mean words " She reports feeling well this morning  She denies complaints this morning "ready to get dressed and go home " She denies complaints of pain  She is voiding via vargas catheter  She is ambulating  She is passing flatus, no bowel movements  She is tolerating PO regular diet and denies nausea or vomiting  She denies fever, chills, chest pain, shortness of breath, or calf tenderness  /76   Pulse (!) 120   Temp 98 °F (36 7 °C)   Resp 20   Ht 5' 5" (1 651 m)   Wt 71 2 kg (157 lb)   LMP  (LMP Unknown)   SpO2 95%   BMI 26 13 kg/m²     I/O last 3 completed shifts: In: 2702 9 [P O :180; I V :2422 9; IV Piggyback:100]  Out: 1881 [Urine:3000; Blood:75]  No intake/output data recorded  Lab Results   Component Value Date    WBC 5 75 12/27/2019    HGB 14 7 12/27/2019    HCT 43 6 12/27/2019    MCV 92 12/27/2019     (L) 12/27/2019       Lab Results   Component Value Date    CALCIUM 9 0 01/21/2020    K 3 1 (L) 01/21/2020    CO2 29 01/21/2020     01/21/2020    BUN 17 01/21/2020    CREATININE 1 11 01/21/2020       Lab Results   Component Value Date/Time    POCGLU 106 01/21/2020 10:11 AM       Physical Exam  Physical Exam   Constitutional: She is oriented to person, place, and time  She appears well-developed and well-nourished  No distress     Genitourinary:   Genitourinary Comments: Vargas catheter in place  External female genitalia grossly normal appearing    Vaginal packing not in place  When I inquired with the patient she reports that "it was falling out overnight " I personally searched the trashcan with the RN present and the vaginal was located and accounted for  HENT:   Head: Normocephalic and atraumatic  Cardiovascular: Normal rate, regular rhythm and normal heart sounds  Exam reveals no gallop and no friction rub  No murmur heard  Pulmonary/Chest: Effort normal and breath sounds normal  No stridor  No respiratory distress  She has no wheezes  She has no rales  Abdominal: Soft  Bowel sounds are normal  She exhibits no distension  There is no tenderness  There is no rebound and no guarding  Neurological: She is alert and oriented to person, place, and time  Skin: She is not diaphoretic  Assessment / Plan:   Phillip Freire is 70 y  o with pelvic organ prolapse POD#0 status post total vaginal hysterectomy, left salpingectomy, anterior colporrhaphy  Episode of confusion overnight, now resolved  1) Pelvic organ prolapse:   Status post surgery as mentioned above  Follow up final pathology outpatient  Continue Estrace outpatient  2) Postoperative management:   Follow up AM labs  Hdz out this morning, follow up voiding trial  - FEN: Regular  - Pain: Tylenol  Avoid NSAIDs due to GFR, avoid narcotics due to age/delirium  - Encouraged incentive spirometry to reduce atelectasis and pneumonia risk  - Encouraged ambulation as tolerated  - VTE ppx: Ambulation, SCDs    3) Postoperative confusion  Episode at 2AM, see above for details, now resolved  Suspect delirium Vs medication side effect from anesthesia  Patient now at baseline  Follow up AM CBC & CMP, follow up urinalysis  Avoid narcotics or other sedative medications  4) Hypertension: Blood pressures 130-140s/ 60-70s overnight   Continue home Lisinopril-Hydrochlorothiazide    5) Hyperlipidemia: Continue home Simvastatin    6) Disposition: Anticipate discharge home later today    Discussed with Dr Ermias Lazo MD   OBGYN PGY4  1/22/2020

## 2020-01-27 ENCOUNTER — OFFICE VISIT (OUTPATIENT)
Dept: OBGYN CLINIC | Facility: CLINIC | Age: 80
End: 2020-01-27

## 2020-01-27 VITALS
WEIGHT: 152 LBS | DIASTOLIC BLOOD PRESSURE: 70 MMHG | SYSTOLIC BLOOD PRESSURE: 122 MMHG | BODY MASS INDEX: 24.43 KG/M2 | HEIGHT: 66 IN

## 2020-01-27 DIAGNOSIS — Z48.89 POSTOPERATIVE VISIT: Primary | ICD-10-CM

## 2020-01-27 PROCEDURE — 99024 POSTOP FOLLOW-UP VISIT: CPT | Performed by: OBSTETRICS & GYNECOLOGY

## 2020-01-27 NOTE — PROGRESS NOTES
Pt here for 1 week post op visit s/p vaginal hysterectomy anterior repair  Voiding well  First bowel movement today  Denies pelvic pain or bleeding  Did not require pain medication  PE  Abd: soft, non-tender  Pelvic: Vagina clear  No d/c, no bleeding  Suture lines intact  Cuff well supported  Impression:  Stable s/p vag hyst with ant repair  Plan:  Return to office in 5 weeks  No heavy lifting  Nothing in the vagina  Will resume vaginal estrogen therapy after next visit

## 2020-03-02 ENCOUNTER — OFFICE VISIT (OUTPATIENT)
Dept: OBGYN CLINIC | Facility: CLINIC | Age: 80
End: 2020-03-02

## 2020-03-02 VITALS
DIASTOLIC BLOOD PRESSURE: 74 MMHG | WEIGHT: 150 LBS | BODY MASS INDEX: 24.11 KG/M2 | HEIGHT: 66 IN | SYSTOLIC BLOOD PRESSURE: 140 MMHG

## 2020-03-02 DIAGNOSIS — Z48.89 POSTOPERATIVE VISIT: Primary | ICD-10-CM

## 2020-03-02 PROCEDURE — 99024 POSTOP FOLLOW-UP VISIT: CPT | Performed by: OBSTETRICS & GYNECOLOGY

## 2020-03-02 NOTE — PROGRESS NOTES
Patient returns to the office at 6 weeks following vaginal hysterectomy anterior bladder repair  Patient has no complaints voiding well moving her bowels well  No vaginal bleeding or discharge  Physical exam:  Abdomen soft nontender  Pelvic exam:  External genitalia atrophic  Vagina clear  Anterior suture lines well-healed  Vaginal cuff slight bleeding with procto swabs  Two cuff sutures removed with swab  Patient does Billy Player well  Good tone  Impression:  Status post vaginal hysterectomy anterior bladder repair   Doing well    Plan:  Daily Kegel's 20 in the morning 20 in the evening  Information on Kegel's given    Return to office for yearly August 2020

## 2020-03-02 NOTE — PROGRESS NOTES
The patient is here for a 6 week post-op  The patient had a TVH  No bleeding or cramping  No vaginal or urinary issues  No breast concerns

## 2020-08-03 ENCOUNTER — OFFICE VISIT (OUTPATIENT)
Dept: OBGYN CLINIC | Facility: CLINIC | Age: 80
End: 2020-08-03
Payer: COMMERCIAL

## 2020-08-03 VITALS
HEIGHT: 66 IN | BODY MASS INDEX: 24.11 KG/M2 | WEIGHT: 150 LBS | SYSTOLIC BLOOD PRESSURE: 120 MMHG | DIASTOLIC BLOOD PRESSURE: 60 MMHG

## 2020-08-03 DIAGNOSIS — N95.2 VAGINAL ATROPHY: ICD-10-CM

## 2020-08-03 DIAGNOSIS — Z12.11 SCREENING FOR COLON CANCER: ICD-10-CM

## 2020-08-03 DIAGNOSIS — Z01.419 ENCOUNTER FOR ANNUAL ROUTINE GYNECOLOGICAL EXAMINATION: ICD-10-CM

## 2020-08-03 DIAGNOSIS — R10.32 LEFT LOWER QUADRANT ABDOMINAL PAIN: ICD-10-CM

## 2020-08-03 DIAGNOSIS — Z12.31 ENCOUNTER FOR SCREENING MAMMOGRAM FOR BREAST CANCER: Primary | ICD-10-CM

## 2020-08-03 PROCEDURE — G0101 CA SCREEN;PELVIC/BREAST EXAM: HCPCS | Performed by: OBSTETRICS & GYNECOLOGY

## 2020-08-03 NOTE — PROGRESS NOTES
The patient is here for a medicare follow-up  No bleeding or cramping  The patient has some bowel incontinence once in a while  She has had the incontinence since her surgery

## 2020-08-03 NOTE — PROGRESS NOTES
Assessment/Plan:    Vaginal atrophy  Status post vaginal hysterectomy anterior repair 2020  Normal breast exam  Left lower abdominal pain    Plan:  Refer for colonoscopy  Rx mammogram   Continue Kegel exercises  Encouraged healthy diet and exercise  Wero multi vitamin with vitamin-C vitamin-D and zinc       Subjective:      Patient ID: Deepali Tidwell is a 70 y  o  female presents for yearly exam with no complaints left lower abdominal pain  Patient has had a little stool incontinence  Patient is status post vaginal hysterectomy anterior pair  Feeling much better  Denies any UMESH  Denies any pelvic pain or bleeding  Denies any breast problems or bladder issues  Does not remember when she had a colonoscopy  No change in family history  Medications reviewed  Review of Systems   Constitutional: Negative  HENT: Negative  Eyes: Negative  Respiratory: Negative  Cardiovascular: Negative  Gastrointestinal: Negative  Left lower abdominal pain   Endocrine: Negative  Musculoskeletal: Negative  Skin: Negative  Allergic/Immunologic: Negative  Neurological: Negative  Hematological: Negative  Psychiatric/Behavioral: Negative  All other systems reviewed and are negative  Objective:      /60 (BP Location: Left arm, Patient Position: Sitting, Cuff Size: Standard)   Ht 5' 6"   Wt 68 kg (150 lb)   LMP  (LMP Unknown)   BMI 24 21 kg/m²          Physical Exam   Constitutional: She appears well-developed  Cardiovascular: Normal rate, regular rhythm and normal heart sounds  Pulmonary/Chest: Effort normal  No stridor  No respiratory distress  She has no wheezes  She has no rales  She exhibits no tenderness  Right breast exhibits no inverted nipple, no mass, no nipple discharge, no skin change and no tenderness  Left breast exhibits no inverted nipple, no mass, no nipple discharge, no skin change and no tenderness   Breasts are symmetrical  Abdominal: Soft  Bowel sounds are normal  She exhibits no distension and no mass  There is no abdominal tenderness  There is no rebound and no guarding  No hernia  Hernia confirmed negative in the left inguinal area  Area tenderness  No masses  No hernia  Genitourinary:    Vagina normal    Rectum:      No rectal mass, anal fissure, tenderness, external hemorrhoid, internal hemorrhoid or abnormal anal tone  There is no rash, tenderness, lesion or injury on the right labia  There is no rash, tenderness, lesion or injury on the left labia  Right adnexum displays no mass, no tenderness and no fullness  Left adnexum displays no mass, no tenderness and no fullness  No vaginal discharge, erythema, tenderness or bleeding  No erythema, tenderness or bleeding in the vagina  No foreign body in the vagina  No signs of injury in the vagina  Genitourinary Comments: Urethral meatus normal   Vaginal atrophy  Vaginal cuff well supported  No cystocele  Mild rectocele  Rectal tone normal   Good pelvic muscle tone on Kegel     Lymphadenopathy: No inguinal adenopathy noted on the right or left side     Psychiatric: Her behavior is normal  Judgment and thought content normal

## 2021-07-07 ENCOUNTER — HOSPITAL ENCOUNTER (OUTPATIENT)
Dept: MAMMOGRAPHY | Facility: HOSPITAL | Age: 81
Discharge: HOME | End: 2021-07-07
Attending: OBSTETRICS & GYNECOLOGY
Payer: COMMERCIAL

## 2021-07-07 VITALS — BODY MASS INDEX: 24.11 KG/M2 | WEIGHT: 150 LBS | HEIGHT: 66 IN

## 2021-07-07 DIAGNOSIS — Z12.31 ENCOUNTER FOR SCREENING MAMMOGRAM FOR BREAST CANCER: ICD-10-CM

## 2021-07-07 PROCEDURE — 77063 BREAST TOMOSYNTHESIS BI: CPT

## 2021-07-07 PROCEDURE — 77067 SCR MAMMO BI INCL CAD: CPT

## 2021-08-05 ENCOUNTER — ANNUAL EXAM (OUTPATIENT)
Dept: OBGYN CLINIC | Facility: CLINIC | Age: 81
End: 2021-08-05
Payer: COMMERCIAL

## 2021-08-05 VITALS
BODY MASS INDEX: 24.59 KG/M2 | HEIGHT: 66 IN | WEIGHT: 153 LBS | SYSTOLIC BLOOD PRESSURE: 120 MMHG | DIASTOLIC BLOOD PRESSURE: 70 MMHG

## 2021-08-05 DIAGNOSIS — N95.2 VAGINAL ATROPHY: ICD-10-CM

## 2021-08-05 DIAGNOSIS — Z12.31 ENCOUNTER FOR SCREENING MAMMOGRAM FOR BREAST CANCER: Primary | ICD-10-CM

## 2021-08-05 PROCEDURE — 99213 OFFICE O/P EST LOW 20 MIN: CPT | Performed by: OBSTETRICS & GYNECOLOGY

## 2021-08-05 RX ORDER — DONEPEZIL HYDROCHLORIDE 5 MG/1
5 TABLET, FILM COATED ORAL EVERY EVENING
COMMUNITY
Start: 2021-06-01 | End: 2022-03-15

## 2021-08-05 NOTE — PROGRESS NOTES
Assessment/Plan:     Normal breast exam  Vaginal atrophy  Status post hysterectomy anterior repair  Normal colonoscopy 2020  Normal mammogram 2021  Early onset of dementia according to   Received COVID-19 vaccine    Plan:  Rx mammogram   Continue healthy diet and exercise  Recommend Kegel exercises  No heavy lifting  Recommend vitamin-C vitamin-D and zinc     Subjective:      Patient ID: Mary Butt is a 68 y o  female presents to the office for yearly examination accompanied by her   Patient's  states that she has been having memory issues over the last few months  She has been forgetful  Patient denies any pelvic pain or vaginal bleeding  Patient is status post hysterectomy and anterior repair  Denies any breast bowel or bladder issues  No change in family history  Medications reviewed  Patient not taking any multivitamins or vitamin-D 3  Emphasize the importance of taking them  Review of Systems   Constitutional: Negative  Negative for fatigue, fever and unexpected weight change  HENT: Negative  Eyes: Negative  Respiratory: Negative  Negative for chest tightness, shortness of breath, wheezing and stridor  Cardiovascular: Negative  Negative for chest pain, palpitations and leg swelling  Gastrointestinal: Negative  Negative for abdominal pain, blood in stool, diarrhea, nausea, rectal pain and vomiting  Endocrine: Negative  Genitourinary: Negative for dysuria, frequency, vaginal bleeding, vaginal discharge and vaginal pain  Musculoskeletal: Negative  Skin: Negative  Allergic/Immunologic: Negative  Neurological: Negative  Hematological: Negative  Psychiatric/Behavioral: Negative  All other systems reviewed and are negative          Objective:      /70   Ht 5' 5 75" (1 67 m)   Wt 69 4 kg (153 lb)   LMP  (LMP Unknown)   BMI 24 88 kg/m²          Physical Exam  Constitutional:       Appearance: She is well-developed  Cardiovascular:      Rate and Rhythm: Normal rate and regular rhythm  Heart sounds: Normal heart sounds  Pulmonary:      Effort: Pulmonary effort is normal  No respiratory distress  Breath sounds: No stridor  No wheezing or rales  Chest:      Chest wall: No tenderness  Breasts: Breasts are symmetrical          Right: No inverted nipple, mass, nipple discharge, skin change or tenderness  Left: No inverted nipple, mass, nipple discharge, skin change or tenderness  Abdominal:      General: Bowel sounds are normal  There is no distension  Palpations: Abdomen is soft  There is no mass  Tenderness: There is no abdominal tenderness  There is no guarding or rebound  Hernia: No hernia is present  There is no hernia in the left inguinal area  Genitourinary:     Labia:         Right: No rash, tenderness, lesion or injury  Left: No rash, tenderness, lesion or injury  Vagina: Normal  No signs of injury and foreign body  No vaginal discharge, erythema, tenderness or bleeding  Adnexa:         Right: No mass, tenderness or fullness  Left: No mass, tenderness or fullness  Rectum: No mass, tenderness, anal fissure, external hemorrhoid or internal hemorrhoid  Normal anal tone  Comments: Urethral meatus normal   Vaginal atrophy  Normal [place]'s glands  Vaginal cuff well supported  No cystocele rectocele noted  Cervix and uterus absent  Lymphadenopathy:      Lower Body: No right inguinal adenopathy  No left inguinal adenopathy  Psychiatric:         Behavior: Behavior normal          Thought Content:  Thought content normal          Judgment: Judgment normal

## 2021-08-05 NOTE — PROGRESS NOTES
The patient is here for a yearly  No bleeding or cramping  No vaginal, bowel, bladder or breast problems

## 2022-03-10 ENCOUNTER — TELEPHONE (OUTPATIENT)
Dept: NEUROLOGY | Facility: CLINIC | Age: 82
End: 2022-03-10

## 2022-03-10 NOTE — TELEPHONE ENCOUNTER
Left message on home phone to confirm patient's 3/15/2022 @ 1 pm appointment and to provide patient with new office location of [address] [place] [address],[address], [place]  Call back number given [phone]

## 2022-03-15 ENCOUNTER — CONSULT (OUTPATIENT)
Dept: NEUROLOGY | Facility: CLINIC | Age: 82
End: 2022-03-15
Payer: COMMERCIAL

## 2022-03-15 VITALS
SYSTOLIC BLOOD PRESSURE: 158 MMHG | WEIGHT: 166 LBS | HEIGHT: 66 IN | BODY MASS INDEX: 26.68 KG/M2 | DIASTOLIC BLOOD PRESSURE: 72 MMHG

## 2022-03-15 DIAGNOSIS — G30.1 LATE ONSET ALZHEIMER'S DEMENTIA WITHOUT BEHAVIORAL DISTURBANCE (HCC): Primary | ICD-10-CM

## 2022-03-15 DIAGNOSIS — F02.80 LATE ONSET ALZHEIMER'S DEMENTIA WITHOUT BEHAVIORAL DISTURBANCE (HCC): Primary | ICD-10-CM

## 2022-03-15 PROCEDURE — 99205 OFFICE O/P NEW HI 60 MIN: CPT | Performed by: PSYCHIATRY & NEUROLOGY

## 2022-03-15 RX ORDER — MEMANTINE HYDROCHLORIDE 10 MG/1
10 TABLET ORAL 2 TIMES DAILY
Qty: 60 TABLET | Refills: 5 | Status: SHIPPED | OUTPATIENT
Start: 2022-04-15

## 2022-03-15 RX ORDER — RIBOFLAVIN (VITAMIN B2) 100 MG
100 TABLET ORAL DAILY
COMMUNITY

## 2022-03-15 RX ORDER — MEMANTINE HYDROCHLORIDE 5 MG-10 MG
KIT ORAL
Qty: 1 TABLET | Refills: 0 | Status: SHIPPED | OUTPATIENT
Start: 2022-03-15

## 2022-03-15 RX ORDER — OMEGA-3S/DHA/EPA/FISH OIL/D3 300MG-1000
400 CAPSULE ORAL DAILY
COMMUNITY

## 2022-03-15 NOTE — PROGRESS NOTES
Patient ID: Hola Cotter is a 80 y o  female  Assessment/Plan:  In summary, Hola Cotter is a 80 y o   female who presented with memory changes and has an exam notable for a MoCA score of 8 but an otherwise normal neurological exam  A prior workup has included blood work but we do not have access to this  The history and exam are most consistent with moderate to severe dementia  Plan  - MRI Neuroquant  - Will hold off on obtaining additional blood work at this time (B12, CBC, CMP, TSH) until we see what had been completed by PCP  - Will discontinue Aricept as this is not providing benefit  - Pt to start numenda, titration information given to the patient  Diagnoses and all orders for this visit:    Dementia ([facility] [address] )  -     MRI brain NeuroQuant wo contrast; Future  -     memantine (NAMENDA TITRATION PACK); Follow package directions  -     memantine (Namenda) 10 mg tablet; Take 1 tablet (10 mg total) by mouth 2 (two) times a day    Other orders  -     Ascorbic Acid (vitamin C) 100 MG tablet; Take 100 mg by mouth daily  -     cholecalciferol (VITAMIN D3) 400 units tablet; Take 400 Units by mouth daily         Subjective:  Hola Cotter is a 80 y o  female with PMHx of HTN and HLD who presents today for evalation of memory changes  The patient reports that his memory issues repeating questions, remembering new information, can't remember dates, times, appointments, some issues with names (can know she knows a person but cant place them)  Primarily issues with short term memory  Denies issues getting lost, forgetting how to use things and reports these issues have been going on for 3-4 years  Reports frequent nap taking  Short attention span  Has been on dinazapil 5mg for about a year and no effect has been noted  The patient denies a family history of memory issues  The patient denies a history of mood issues  The patient reports that he on average sleeps 9 hours a night   no problems falling asleep, no problems staying asleep, no snoring while asleep  History of ADHD/learning disability (reading, spelling, foreign languages): No  History of head trauma: No  Driving: Stopped driving, she reports she did not like driving anymore  No accidents  She has issues balancing a checkbook  She no longer handles bills, her  does this  Help with taking medication:  does it but she could do it alone  Help preparing meals: Some, she reports issues with measurements  Help with eating: No  Help with house hold tasks: No  Help with bathing and showering: No   Help with dressing: No    High school education  Clerical work previously, now retired  Alcohol: Drinks wine, periodically  Illicit substances: Denies    Social norms: Denies any issues  Leaving appliances on: No  Hallucinations: None  Tremor, slowness, stiffness: Denies   Paranoia: No    Could not remember 911    The following portions of the patient's history were reviewed and updated as appropriate: allergies, current medications, past family history, past medical history, past social history, past surgical history and problem list          Objective:    Blood pressure 158/72, height 5' 5 75" (1 67 m), weight 75 3 kg (166 lb), not currently breastfeeding  Physical Exam  Vitals and nursing note reviewed  Constitutional:       General: She is not in acute distress  Appearance: She is not ill-appearing, toxic-appearing or diaphoretic  HENT:      Head: Normocephalic and atraumatic  Nose: Nose normal       Mouth/Throat:      Mouth: Mucous membranes are moist       Pharynx: Oropharynx is clear  No oropharyngeal exudate  Eyes:      General: Lids are normal  No scleral icterus  Right eye: No discharge  Left eye: No discharge  Extraocular Movements: Extraocular movements intact  Pupils: Pupils are equal, round, and reactive to light  Cardiovascular:      Rate and Rhythm: Normal rate  Pulses: Normal pulses  Pulmonary:      Effort: Pulmonary effort is normal    Abdominal:      General: Abdomen is flat  Musculoskeletal:         General: No swelling or deformity  Normal range of motion  Cervical back: Neck supple  Skin:     General: Skin is warm and dry  Neurological:      Mental Status: She is alert  Psychiatric:         Mood and Affect: Mood normal          Speech: Speech normal          Behavior: Behavior normal          Neurological Exam  Mental Status  Alert  Recent and remote memory are intact  Speech is normal  Language is fluent with no aphasia  Attention and concentration are normal     Cranial Nerves  CN II: Visual acuity is normal  Visual fields full to confrontation  CN III, IV, VI: Extraocular movements intact bilaterally  Normal lids and orbits bilaterally  Pupils equal round and reactive to light bilaterally  CN V: Facial sensation is normal   CN VII: Full and symmetric facial movement  CN VIII: Hearing is normal   CN IX, X: Palate elevates symmetrically  CN XI: Shoulder shrug strength is normal   CN XII: Tongue midline without atrophy or fasciculations  Motor  Normal muscle bulk throughout  No fasciculations present  Normal muscle tone  No abnormal involuntary movements  Strength is 5/5 in all four extremities except as noted  Sensory  Temperature is normal in upper and lower extremities  Vibration is normal in upper and lower extremities  Reflexes  Deep tendon reflexes are 2+ and symmetric except as noted  Coordination  Right: Finger-to-nose normal   Left: Finger-to-nose normal     Gait  Casual gait is normal including stance, stride, and arm swing      Vic Cognitive Assessment (MoCA) - Version 7 1  Education: HS (+1 point if </= 12 years)     Points Earned POSSIBLE Points   Visuospatial/Executive   Alternating [place] Making 0 1   Visuoconstructional skills 0 1   Visuoconstructional skills (clock) 0 3   Naming   Naming Animals 2 3   Attention   Digit Span 2 2   Vigilance (letters) 0 1   Serial 7 subtraction 1 3   Language   Sentence Repetition 0 2   Verbal fluency 0 1   Abstraction   Abstraction (word pairings) 1 2   Delayed recall   Delayed recall 0 5   Orientation   Orientation 2 6   TOTAL SCORE: 8/30  (Normal ?26/30)       ROS:    Review of Systems   Constitutional: Negative  Negative for appetite change and fever  HENT: Negative  Negative for hearing loss, tinnitus, trouble swallowing and voice change  Eyes: Negative  Negative for photophobia and pain  Respiratory: Negative  Negative for shortness of breath  Cardiovascular: Negative  Negative for palpitations  Gastrointestinal: Negative  Negative for nausea and vomiting  Endocrine: Negative  Negative for cold intolerance  Genitourinary: Negative  Negative for dysuria, frequency and urgency  Musculoskeletal: Negative  Negative for myalgias and neck pain  Skin: Negative  Negative for rash  Neurological: Negative  Negative for dizziness, tremors, seizures, syncope, facial asymmetry, speech difficulty, weakness, light-headedness, numbness and headaches  Hematological: Negative  Does not bruise/bleed easily  Psychiatric/Behavioral: Positive for confusion (concerns of dementia)  Negative for hallucinations and sleep disturbance  All other systems reviewed and are negative

## 2022-03-15 NOTE — PATIENT INSTRUCTIONS
Remember to exercise (at least 30 min of aerobic/cardio exercise 3 days a week), eat a healthy diet (eg Mediterranean or MIND diet), remain mentally active and socially engaged  Good quality sleep is also very important for cognition  SOME HELPFUL SUGGESTIONS:   - Mindfulness matters  The more you reflect on an experience, the more it will endure  - The hippocampus (the part of the brain involved in memory) responds to novelty: Expose yourself to information in different ways to enhance novelty and to broaden the neural representation of the event  - Information may exceed your attention span: limit the amount you learn at one time    - Organize according to categories (eg a list of groceries = dairy, fruits, meats)  - Active participation in conversations reinforces details of the discussion    - Associate new information with old information to establish a network of durable memories  EXTERNAL MEMORY AIDS:  - Use a notebook or technology (phone based enzo) to maintain schedules, calendar and "to do" lists  There are a number of well-received smartphone applications to help with memory and executive functions   - Evernote: This enzo is a Running "notebook" that does not teri to be organized and can recognize text from pictures, business cards, and appointment books according to the date of entry  Retrieval of information is simple and done via a single search bar at the top of the screen  - Remember the Milk: This enzo allows the user to create grocery and other "to-do" lists that can be prioritized according to importance, with reminders that can be anni-tagged by location (ie if you have "buy stamps" on your list and are passing a post-office, your phone will receive a message from the enzo)  - use a "Memory Table" in your home - make a habit of putting your glasses, keys, wallet ect in a central location on a consistent basis  - Pill organizers are useful to keep track of medication use     - Try using a white board at home to remind yourself of upcoming daily or weekly tasks  Please call the office if you have any questions or concerns  The office number is [phone]

## 2022-03-21 ENCOUNTER — TELEPHONE (OUTPATIENT)
Dept: NEUROLOGY | Facility: CLINIC | Age: 82
End: 2022-03-21

## 2022-03-21 NOTE — TELEPHONE ENCOUNTER
melinda    Pt's  Nevaeh Barry left a message today at [phone] requesting for a CB at [phone] re: new med that was prescribed  Called [phone], spoke w/ pt's  Nevaeh Barry and states that Dr Milton Rangel ordered memantine  There were 2 separate scripts  Asking if pt should start memantine 10 mg first? Advised pt to start memantine titration pack first as ordered  Once completed, pt to start memantine 10 mg bid    Tarun verbalized understanding

## 2022-04-08 ENCOUNTER — HOSPITAL ENCOUNTER (OUTPATIENT)
Dept: MRI IMAGING | Facility: HOSPITAL | Age: 82
Discharge: HOME | End: 2022-04-08
Payer: COMMERCIAL

## 2022-04-08 DIAGNOSIS — F02.80 LATE ONSET ALZHEIMER'S DEMENTIA WITHOUT BEHAVIORAL DISTURBANCE (HCC): ICD-10-CM

## 2022-04-08 DIAGNOSIS — G30.1 LATE ONSET ALZHEIMER'S DEMENTIA WITHOUT BEHAVIORAL DISTURBANCE (HCC): ICD-10-CM

## 2022-04-08 PROCEDURE — G1004 CDSM NDSC: HCPCS

## 2022-04-08 PROCEDURE — 70551 MRI BRAIN STEM W/O DYE: CPT

## 2022-04-27 ENCOUNTER — TELEPHONE (OUTPATIENT)
Dept: NEUROLOGY | Facility: CLINIC | Age: 82
End: 2022-04-27

## 2022-04-27 NOTE — TELEPHONE ENCOUNTER
From: Radha Chisholm DO   Sent: 4/22/2022   1:19 PM EDT   To: [facility]     Would you call the pt and let them know MRI is negative for signs of Alzheimer  Pt's  Yaquelin Robertson made aware of the above  He verbalized understanding

## 2022-06-23 ENCOUNTER — TELEPHONE (OUTPATIENT)
Dept: NEUROLOGY | Facility: CLINIC | Age: 82
End: 2022-06-23

## 2022-06-23 NOTE — TELEPHONE ENCOUNTER
Spoke with patient's spouse and confirmed her 6/28/2022 @ 4:30 pm appointment with Dr Imelda Maher and confirmed office address of [address], [place]

## 2022-06-28 ENCOUNTER — OFFICE VISIT (OUTPATIENT)
Dept: NEUROLOGY | Facility: CLINIC | Age: 82
End: 2022-06-28
Payer: COMMERCIAL

## 2022-06-28 VITALS
TEMPERATURE: 97.4 F | HEART RATE: 83 BPM | SYSTOLIC BLOOD PRESSURE: 130 MMHG | WEIGHT: 155 LBS | BODY MASS INDEX: 24.91 KG/M2 | DIASTOLIC BLOOD PRESSURE: 70 MMHG | HEIGHT: 66 IN

## 2022-06-28 DIAGNOSIS — F02.80 LATE ONSET ALZHEIMER'S DEMENTIA WITHOUT BEHAVIORAL DISTURBANCE (HCC): Primary | ICD-10-CM

## 2022-06-28 DIAGNOSIS — G30.1 LATE ONSET ALZHEIMER'S DEMENTIA WITHOUT BEHAVIORAL DISTURBANCE (HCC): Primary | ICD-10-CM

## 2022-06-28 PROCEDURE — 99213 OFFICE O/P EST LOW 20 MIN: CPT | Performed by: PSYCHIATRY & NEUROLOGY

## 2022-06-28 NOTE — PROGRESS NOTES
Patient ID: Moshe Redding is a 80 y o  female  Assessment/Plan:  In summary, Moshe Redding is a 80 y o  female who presented for follow up for with memory changes and has an exam notable for significant impairment in cognition and memory, at the previous visit the patient had a MoCA score of 8  Since the last visit the patient underwent a NeuroQuant imaging and was started on Namenda  Imaging was not positive for any signs of dementia radiographically  The patient is tolerating Namenda  The history and exam are most consistent with moderate to severe dementia       Plan  - Continue Namenda  - Instructions regarding memory and memory health given to the patient and her  recommend that the patient continue to stay active  - Will follow up with the patient in 6 months     Diagnoses and all orders for this visit:    Late onset Alzheimer's dementia without behavioral disturbance ([facility] [address] )  -     Vitamin B12; Future           Subjective:    Moshe Redding is a 80 y o  female with PMHx of dementia who presents today for follow-up for memory changes  The patient is staying active she is not having any issues eating, dressing, remembering the names of her kids or grand kids  The patient is not reading as much as she used to or doing as much needlepoint work as previous  We discussed the importance of maintaining an active lifestyle  The patient's  does not have any safety concerns including leaving appliances on were wondering for example  To review from previous documentation:  Moshe Redding is a 80 y o  female with PMHx of HTN and HLD who presents today for evalation of memory changes  The patient reports that his memory issues repeating questions, remembering new information, can't remember dates, times, appointments, some issues with names (can know she knows a person but cant place them)  Primarily issues with short term memory   Denies issues getting lost, forgetting how to use things and reports these issues have been going on for 3-4 years  Reports frequent nap taking  Short attention span  Has been on dinazapil 5mg for about a year and no effect has been noted  The patient denies a family history of memory issues  The patient denies a history of mood issues  The patient reports that he on average sleeps 9 hours a night  no problems falling asleep, no problems staying asleep, no snoring while asleep      History of ADHD/learning disability (reading, spelling, foreign languages): No  History of head trauma: No  Driving: Stopped driving, she reports she did not like driving anymore  No accidents  She has issues balancing a checkbook  She no longer handles bills, her  does this  Help with taking medication:  does it but she could do it alone  Help preparing meals: Some, she reports issues with measurements  Help with eating: No  Help with house hold tasks: No  Help with bathing and showering: No   Help with dressing: No     High school education  Clerical work previously, now retired  Alcohol: Drinks wine, periodically  Illicit substances: Denies     Social norms: Denies any issues  Leaving appliances on: No  Hallucinations: None  Tremor, slowness, stiffness: Denies   Paranoia: No    The following portions of the patient's history were reviewed and updated as appropriate: allergies, current medications, past family history, past medical history, past social history, past surgical history and problem list           Objective:    Blood pressure 130/70, pulse 83, temperature (!) 97 4 °F (36 3 °C), height 5' 5 75" (1 67 m), weight 70 3 kg (155 lb), not currently breastfeeding  Physical Exam  Vitals and nursing note reviewed  Constitutional:       General: She is awake  She is not in acute distress  Appearance: She is not ill-appearing, toxic-appearing or diaphoretic  HENT:      Head: Normocephalic and atraumatic        Nose: Nose normal       Mouth/Throat:      Mouth: Mucous membranes are moist       Pharynx: Oropharynx is clear  No oropharyngeal exudate  Eyes:      General: Lids are normal  No scleral icterus  Right eye: No discharge  Left eye: No discharge  Extraocular Movements: Extraocular movements intact  Pupils: Pupils are equal, round, and reactive to light  Cardiovascular:      Rate and Rhythm: Normal rate  Pulses: Normal pulses  Pulmonary:      Effort: Pulmonary effort is normal    Abdominal:      General: Abdomen is flat  Musculoskeletal:         General: No swelling or deformity  Skin:     General: Skin is warm and dry  Neurological:      Mental Status: She is alert  Psychiatric:         Mood and Affect: Mood normal          Speech: Speech normal          Behavior: Behavior normal          Neurological Exam  Mental Status  Awake, alert and oriented to person, place and time  Awake and alert  Oriented only to person  Recent and remote memory are intact  Speech is normal  Unable to perform serial calculations  Patient is able to name some objects but not name parts of objects  Cranial Nerves  CN II: Visual acuity is normal  Visual fields full to confrontation  CN III, IV, VI: Extraocular movements intact bilaterally  Normal lids and orbits bilaterally  Pupils equal round and reactive to light bilaterally  CN V: Facial sensation is normal   CN VII: Full and symmetric facial movement  CN VIII: Hearing is normal   CN IX, X: Palate elevates symmetrically  CN XI: Shoulder shrug strength is normal   CN XII: Tongue midline without atrophy or fasciculations  Motor  Normal muscle bulk throughout  Normal muscle tone  No abnormal involuntary movements  Strength is 5/5 in all four extremities except as noted  Sensory  Sensation is intact to light touch, pinprick, vibration and proprioception in all four extremities  Reflexes  Deep tendon reflexes are 2+ and symmetric except as noted      Coordination  Right: Finger-to-nose normal Left: Finger-to-nose normal     Gait  Casual gait is normal including stance, stride, and arm swing  ROS:    Review of Systems   Constitutional: Negative  Negative for appetite change and fever  HENT: Negative  Negative for hearing loss, tinnitus, trouble swallowing and voice change  Eyes: Negative  Negative for photophobia and pain  Respiratory: Negative  Negative for shortness of breath  Cardiovascular: Negative  Negative for palpitations  Gastrointestinal: Negative  Negative for nausea and vomiting  Endocrine: Negative  Negative for cold intolerance  Genitourinary: Negative  Negative for dysuria, frequency and urgency  Musculoskeletal: Negative  Negative for myalgias and neck pain  Skin: Negative  Negative for rash  Neurological: Negative  Negative for dizziness, tremors, seizures, syncope, facial asymmetry, speech difficulty, weakness, light-headedness, numbness and headaches  Hematological: Negative  Does not bruise/bleed easily  Psychiatric/Behavioral: Positive for confusion  Negative for hallucinations and sleep disturbance

## 2022-06-28 NOTE — PATIENT INSTRUCTIONS
Please obtain a B12 level at the lab  Remember to exercise (at least 30 min of aerobic/cardio exercise 3 days a week), eat a healthy diet (eg Mediterranean or MIND diet), remain mentally active and socially engaged  Good quality sleep is also very important for cognition  SOME HELPFUL SUGGESTIONS:   - Mindfulness matters  The more you reflect on an experience, the more it will endure  - The hippocampus (the part of the brain involved in memory) responds to novelty: Expose yourself to information in different ways to enhance novelty and to broaden the neural representation of the event  - Information may exceed your attention span: limit the amount you learn at one time    - Organize according to categories (eg a list of groceries = dairy, fruits, meats)  - Active participation in conversations reinforces details of the discussion    - Associate new information with old information to establish a network of durable memories  EXTERNAL MEMORY AIDS:  - Use a notebook or technology (phone based enzo) to maintain schedules, calendar and "to do" lists  There are a number of well-received smartphone applications to help with memory and executive functions   - Evernote: This enzo is a Running "notebook" that does not teri to be organized and can recognize text from pictures, business cards, and appointment books according to the date of entry  Retrieval of information is simple and done via a single search bar at the top of the screen  - Remember the Milk: This enzo allows the user to create grocery and other "to-do" lists that can be prioritized according to importance, with reminders that can be anni-tagged by location (ie if you have "buy stamps" on your list and are passing a post-office, your phone will receive a message from the enzo)  - use a "Memory Table" in your home - make a habit of putting your glasses, keys, wallet ect in a central location on a consistent basis     - Pill organizers are useful to keep track of medication use  - Try using a white board at home to remind yourself of upcoming daily or weekly tasks  Please call the office if you have any questions or concerns  The office number is [phone]

## 2022-06-30 ENCOUNTER — APPOINTMENT (OUTPATIENT)
Dept: LAB | Facility: CLINIC | Age: 82
End: 2022-06-30
Payer: COMMERCIAL

## 2022-06-30 DIAGNOSIS — F02.80 LATE ONSET ALZHEIMER'S DEMENTIA WITHOUT BEHAVIORAL DISTURBANCE (HCC): ICD-10-CM

## 2022-06-30 DIAGNOSIS — G30.1 LATE ONSET ALZHEIMER'S DEMENTIA WITHOUT BEHAVIORAL DISTURBANCE (HCC): ICD-10-CM

## 2022-06-30 LAB — VIT B12 SERPL-MCNC: 369 PG/ML (ref 100–900)

## 2022-06-30 PROCEDURE — 82607 VITAMIN B-12: CPT

## 2022-06-30 PROCEDURE — 36415 COLL VENOUS BLD VENIPUNCTURE: CPT

## 2022-07-09 ENCOUNTER — HOSPITAL ENCOUNTER (OUTPATIENT)
Dept: MAMMOGRAPHY | Facility: HOSPITAL | Age: 82
Discharge: HOME | End: 2022-07-09
Attending: OBSTETRICS & GYNECOLOGY
Payer: COMMERCIAL

## 2022-07-09 VITALS — WEIGHT: 155 LBS | HEIGHT: 66 IN | BODY MASS INDEX: 24.91 KG/M2

## 2022-07-09 DIAGNOSIS — Z12.31 ENCOUNTER FOR SCREENING MAMMOGRAM FOR BREAST CANCER: ICD-10-CM

## 2022-07-09 PROCEDURE — 77067 SCR MAMMO BI INCL CAD: CPT

## 2022-07-09 PROCEDURE — 77063 BREAST TOMOSYNTHESIS BI: CPT

## 2022-07-12 ENCOUNTER — TELEPHONE (OUTPATIENT)
Dept: NEUROLOGY | Facility: CLINIC | Age: 82
End: 2022-07-12

## 2022-07-12 NOTE — TELEPHONE ENCOUNTER
Pt's  Doug Marrero calling to get the results of vit B12  Advised him that the result is within normal range and that Dr Sue Mitchell would be made aware  Pt's  verbalizes understanding and states that if Dr Sue Mitchell thinks pt needs to be taking supplement, please let him know  Dr Gwyn Martinez results are available for your review

## 2022-07-13 NOTE — TELEPHONE ENCOUNTER
Spoke with pt's  Alaina Huddleston (on communication consent)  Made him aware of below, he verbalizes understanding

## 2022-07-13 NOTE — TELEPHONE ENCOUNTER
----- Message from [employer], DO sent at 7/6/2022  9:26 AM EDT -----  Can you please call the patient and let her know her B12 is low and ask her to start taking a B12 supplement, around 1000mcg a day   Thank you!     ----- Message -----  From: Lab, Background User  Sent: 6/30/2022   4:15 PM EDT  To: [employer], DO

## 2022-08-15 ENCOUNTER — OFFICE VISIT (OUTPATIENT)
Dept: OBGYN CLINIC | Facility: CLINIC | Age: 82
End: 2022-08-15
Payer: COMMERCIAL

## 2022-08-15 VITALS — BODY MASS INDEX: 24.72 KG/M2 | WEIGHT: 152 LBS | DIASTOLIC BLOOD PRESSURE: 70 MMHG | SYSTOLIC BLOOD PRESSURE: 130 MMHG

## 2022-08-15 DIAGNOSIS — Z01.419 ENCOUNTER FOR ANNUAL ROUTINE GYNECOLOGICAL EXAMINATION: Primary | ICD-10-CM

## 2022-08-15 DIAGNOSIS — N95.2 VAGINAL ATROPHY: ICD-10-CM

## 2022-08-15 PROCEDURE — G0101 CA SCREEN;PELVIC/BREAST EXAM: HCPCS | Performed by: OBSTETRICS & GYNECOLOGY

## 2022-08-15 NOTE — PROGRESS NOTES
Assessment/Plan:    Normal breast exam  Vaginal atrophy  Status post hysterectomy and anterior repair  Normal mammogram 2022  Normal colonoscopy 2020  COVID vaccine in booster  Mild memory loss    Plan:  Continue healthy diet weight-bearing exercise  Return to office in 2 years  Patient declines further mammograms  Subjective:      Patient ID: Cindy Raya is a 80 y o  female presents to the office accompanied by her  for follow-up evaluation for pelvic prolapse  Patient is status post hysterectomy and anterior repair  Has no complaints of pressure pelvic pain or vaginal bleeding  No breast bowel or bladder issues  Medications reviewed  Julia Cristhiandelores her  states that her memory loss has worsened over the past year  Review of Systems   Constitutional: Negative  Negative for fatigue, fever and unexpected weight change  HENT: Negative  Eyes: Negative  Respiratory: Negative  Negative for chest tightness, shortness of breath, wheezing and stridor  Cardiovascular: Negative  Negative for chest pain, palpitations and leg swelling  Gastrointestinal: Negative  Negative for abdominal pain, blood in stool, diarrhea, nausea, rectal pain and vomiting  Endocrine: Negative  Genitourinary: Negative for dysuria, frequency, vaginal bleeding, vaginal discharge and vaginal pain  Musculoskeletal: Negative  Skin: Negative  Allergic/Immunologic: Negative  Neurological: Negative  Hematological: Negative  Psychiatric/Behavioral: Negative  All other systems reviewed and are negative  Objective:      /70   Wt 68 9 kg (152 lb)   LMP  (LMP Unknown)   BMI 24 72 kg/m²          Physical Exam  Constitutional:       Appearance: She is well-developed  Cardiovascular:      Rate and Rhythm: Normal rate and regular rhythm  Heart sounds: Normal heart sounds  Pulmonary:      Effort: Pulmonary effort is normal  No respiratory distress  Breath sounds: No stridor  No wheezing or rales  Chest:      Chest wall: No tenderness  Breasts: Breasts are symmetrical       Right: No inverted nipple, mass, nipple discharge, skin change or tenderness  Left: No inverted nipple, mass, nipple discharge, skin change or tenderness  Abdominal:      General: Bowel sounds are normal  There is no distension  Palpations: Abdomen is soft  There is no mass  Tenderness: There is no abdominal tenderness  There is no guarding or rebound  Hernia: No hernia is present  There is no hernia in the left inguinal area  Genitourinary:     Labia:         Right: No rash, tenderness, lesion or injury  Left: No rash, tenderness, lesion or injury  Vagina: Normal  No signs of injury and foreign body  No vaginal discharge, erythema, tenderness or bleeding  Adnexa:         Right: No mass, tenderness or fullness  Left: No mass, tenderness or fullness  Rectum: No mass, tenderness, anal fissure, external hemorrhoid or internal hemorrhoid  Normal anal tone  Comments: Urethral meatus normal   Cervix and uterus absent  No vaginal vault prolapse  No cystocele or rectocele  Vaginal atrophy  Lymphadenopathy:      Lower Body: No right inguinal adenopathy  No left inguinal adenopathy  Psychiatric:         Behavior: Behavior normal          Thought Content:  Thought content normal          Judgment: Judgment normal

## 2022-09-13 DIAGNOSIS — F02.80 LATE ONSET ALZHEIMER'S DEMENTIA WITHOUT BEHAVIORAL DISTURBANCE (HCC): ICD-10-CM

## 2022-09-13 DIAGNOSIS — G30.1 LATE ONSET ALZHEIMER'S DEMENTIA WITHOUT BEHAVIORAL DISTURBANCE (HCC): ICD-10-CM

## 2022-09-13 NOTE — TELEPHONE ENCOUNTER
Patient's  Yovani Galo left  requesting refill of Memantine 10mg - 1 tab BID      Script pended below  Remy Tillman - please sign if agreeable  Thank you

## 2022-09-15 RX ORDER — MEMANTINE HYDROCHLORIDE 10 MG/1
10 TABLET ORAL 2 TIMES DAILY
Qty: 60 TABLET | Refills: 5 | Status: SHIPPED | OUTPATIENT
Start: 2022-09-15 | End: 2022-10-10

## 2022-10-09 DIAGNOSIS — G30.1 LATE ONSET ALZHEIMER'S DEMENTIA WITHOUT BEHAVIORAL DISTURBANCE (HCC): ICD-10-CM

## 2022-10-09 DIAGNOSIS — F02.80 LATE ONSET ALZHEIMER'S DEMENTIA WITHOUT BEHAVIORAL DISTURBANCE (HCC): ICD-10-CM

## 2022-10-10 RX ORDER — MEMANTINE HYDROCHLORIDE 10 MG/1
TABLET ORAL
Qty: 180 TABLET | Refills: 2 | Status: SHIPPED | OUTPATIENT
Start: 2022-10-10

## 2022-11-10 ENCOUNTER — TELEPHONE (OUTPATIENT)
Dept: NEUROLOGY | Facility: CLINIC | Age: 82
End: 2022-11-10

## 2022-11-10 NOTE — TELEPHONE ENCOUNTER
Left message on home phone to confirm patient's 11/15/2022 @ 4:30 pm with Dr La Nena Adamson at the [facility]  Call back number given [phone]

## 2022-11-15 ENCOUNTER — OFFICE VISIT (OUTPATIENT)
Dept: NEUROLOGY | Facility: CLINIC | Age: 82
End: 2022-11-15

## 2022-11-15 VITALS
SYSTOLIC BLOOD PRESSURE: 128 MMHG | WEIGHT: 148 LBS | BODY MASS INDEX: 23.78 KG/M2 | TEMPERATURE: 98.5 F | HEART RATE: 81 BPM | DIASTOLIC BLOOD PRESSURE: 78 MMHG | HEIGHT: 66 IN

## 2022-11-15 DIAGNOSIS — G30.1 LATE ONSET ALZHEIMER'S DEMENTIA WITHOUT BEHAVIORAL DISTURBANCE (HCC): Primary | ICD-10-CM

## 2022-11-15 DIAGNOSIS — F02.80 LATE ONSET ALZHEIMER'S DEMENTIA WITHOUT BEHAVIORAL DISTURBANCE (HCC): Primary | ICD-10-CM

## 2022-11-15 NOTE — PATIENT INSTRUCTIONS
Things that we know are helpful for thinking and memory   Exercise program: gradually increase your physical activity over time  Start small and be patient  Aerobic (cardio) activity is best but incorporate balance, strength and flexibility training as well  Try to get at least 30min 3 times per week   Diet: Mediterranean diet (colorful fruits and vegetables, olive oil, fish, whole grains, very little red meet is any), MIND diet, anti-inflammatory diet  Stay well hydrated: drink 6-8 glasses of water per day   What's good for your heart is good for your brain  Avoid high-salt foods   Sleep: aim for at least 7-8 hours per night   Avoid alcohol and medications like Benadryl (Tylenol PM) or other sedating drugs  Stress management/mindfulness practice:   Talk with our social workers about finding a cognitive behavioral therapists  Try a smart phone enzo like “Lollipuff” or “mindfulness for beginners”   Try “curable” for pain    Take a course in Mindfulness Based Stress Reduction (MBSR)   Alma lozada  Read or listen to an audiobook about it:  Mindfulness for beginners   10% happier  The happiness advantage   Social engagement:   Stay in touch with family and friends   Plan a few specific activities for your social health every week   Join a local support group   Volunteer! www [email or web address] or call [phone]     MIND diet score:  1 point for each component      Green leafy vegetables: at least 6 per week  Other vegetable: at least 1 per day   Berries: at least 2 per week  Red meat: fewer than 4 per week   Fish: at least 1 per week   Poultry: at least 2 per week  Beans: at least 3 per week  Nuts: at least 5 per week  Fast or fried food: less than 1 per week  Olive oil   Butter less: less than 1 table-spoon per day   Cheese: less than 1 serving per week   Pastries/sweets: less than 5 servings per week  Alcohol: no more than 1 serving/ day

## 2022-11-15 NOTE — PROGRESS NOTES
Patient ID: Emmy Grubbs is a [de-identified] y o  female  Assessment/Plan:  In summary, Elsi Bo is a 80 y o  female who presented for follow up for with memory changes and has an exam notable for significant impairment in cognition and memory, at a previous visit the patient had a MoCA score of 8  Previous  Imaging was not positive for any signs of dementia radiographically  The patient is tolerating Namenda  No significant changes   The history and exam are most consistent with moderate to severe dementia       Plan  - Continue Namenda  - Instructions regarding memory and memory health given to the patient and her  recommend that the patient continue to stay active  - Will follow up with the patient in 6 months     Diagnoses and all orders for this visit:    Late onset Alzheimer's dementia without behavioral disturbance ([facility] [address] )    Other orders  -     Cyanocobalamin ([facility] VITAMIN B12 PO); Take 1,200 mg by mouth in the morning          Subjective:  Emmy Grubbs is a 80 y o  female with PMHx of dementia who presents today for follow-up for memory changes  Since the last visit the patient has not had any significant changes for reported by either the patient or her   The patient is unable to cook for herself, but is able to eat by herself  The patient has no wondering, inappropriate behavior is or hallucinations  The patient is able to be left alone for brief periods of time  The patient keeps busy by doing puzzles and coloring  To review from previous documentation:  Emmy Grubbs is a 80 y o  female with PMHx of dementia who presents today for follow-up for memory changes  The patient is staying active she is not having any issues eating, dressing, remembering the names of her kids or grand kids  The patient is not reading as much as she used to or doing as much needlepoint work as previous  We discussed the importance of maintaining an active lifestyle    The patient's  does not have any safety concerns including leaving appliances on were wondering for example  Charlotte Bo is a 80 y o  female with PMHx of HTN and HLD who presents today for evalation of memory changes  The patient reports that his memory issues repeating questions, remembering new information, can't remember dates, times, appointments, some issues with names (can know she knows a person but cant place them)  Primarily issues with short term memory  Denies issues getting lost, forgetting how to use things and reports these issues have been going on for 3-4 years  Reports frequent nap taking  Short attention span  Has been on dinazapil 5mg for about a year and no effect has been noted  The patient denies a family history of memory issues  The patient denies a history of mood issues  The patient reports that he on average sleeps 9 hours a night  no problems falling asleep, no problems staying asleep, no snoring while asleep      History of ADHD/learning disability (reading, spelling, foreign languages): No  History of head trauma: No  Driving: Stopped driving, she reports she did not like driving anymore  No accidents  She has issues balancing a checkbook  She no longer handles bills, her  does this     Help with taking medication:  does it but she could do it alone  Help preparing meals: Some, she reports issues with measurements  Help with eating: No  Help with house hold tasks: No  Help with bathing and showering: No   Help with dressing: No     High school education  Clerical work previously, now retired  Alcohol: Drinks wine, periodically  Illicit substances: Denies     Social norms: Denies any issues  Leaving appliances on: No  Hallucinations: None  Tremor, slowness, stiffness: Denies   Paranoia: No    The following portions of the patient's history were reviewed and updated as appropriate: allergies, current medications, past family history, past medical history, past social history, past surgical history and problem list     Objective:    Blood pressure 128/78, pulse 81, temperature 98 5 °F (36 9 °C), height 5' 5 75" (1 67 m), weight 67 1 kg (148 lb), not currently breastfeeding  Physical Exam  Vitals and nursing note reviewed  Constitutional: Alert  Not in acute distress  Not ill-appearing, toxic-appearing or diaphoretic  HENT: Normocephalic and atraumatic  Nose and Ears normal     Eyes: No scleral icterus  No discharge  Neck: Neck Supple  ROM normal    Cardiovascular: Distal extremities warm without palpable edema or tenderness, no observed significant swelling  Pulmonary:  Pulmonary effort is normal  Not in respiratory distress   Abdominal: Abdomen is flat and not distended   Musculoskeletal: No swelling or deformity  Skin: Warm and dry   Psychiatric:  Normal behavior and appropriate affect      Neurological Exam  Mental Status   Speech is normal  Language is fluent with no aphasia  Oriented to person, place, month, but not year  Unable to state the name of the presedent or vice presedent, is able to state the  is a female  Unable to identify a iphone, able to identify a pen and the point of a the pen, able to identify a thumb and the nail  The patient was unable to show two fingers with her R thumb  Cranial Nerves  CN II: Visual fields full to confrontation  CN III, IV, VI: Extraocular movements intact bilaterally  Normal lids and orbits bilaterally  Pupils equal round and reactive to light bilaterally  CN V: Facial sensation is normal   CN VII: Full and symmetric facial movement  CN VIII: Hearing is normal   CN IX, X: Palate elevates symmetrically  CN XI: Shoulder shrug strength is normal   CN XII: Tongue midline without atrophy or fasciculations  Motor  Normal muscle bulk throughout  No fasciculations present  Normal muscle tone  No abnormal involuntary movements  Strength is 5/5 in all four extremities except as noted      Sensory  Light touch is normal in upper and lower extremities  Reflexes  Deep tendon reflexes are 2+ and symmetric except as noted  Coordination  Right: Finger-to-nose normal Left: Finger-to-nose normal     Gait  Casual gait is normal including stance, stride, and arm swing  ROS:  Patient denies  any headache, dizziness, nausea, vomiting, constipation, diarrhea, chest pain, palpitations, shortness of breath, wheezing  A 12 point review of systems was completed and was negative unless noted above

## 2023-03-20 ENCOUNTER — TELEPHONE (OUTPATIENT)
Dept: NEUROLOGY | Facility: CLINIC | Age: 83
End: 2023-03-20

## 2023-03-20 NOTE — TELEPHONE ENCOUNTER
Pt's  walked into office, stating that last time the pt came in for an appointment, there was talks about a support group and he would like to have more information about that, he would like a call back

## 2023-03-27 NOTE — TELEPHONE ENCOUNTER
I called Mr Palmer Clovis and left a voicemail to please return my call in regards to his questions about a support group  [place] [facility]

## 2023-03-27 NOTE — TELEPHONE ENCOUNTER
Hi Team,    Mr Claros Fore returned my call, and he is really interested in a dementia support group in the area and other services that he may be able to receive for his wife        Thank you

## 2023-05-09 ENCOUNTER — TELEPHONE (OUTPATIENT)
Dept: NEUROLOGY | Facility: CLINIC | Age: 83
End: 2023-05-09

## 2023-05-09 NOTE — TELEPHONE ENCOUNTER
I spoke with patient family member they cancelled appointment  They did ot want to reschedule at this time

## 2023-07-07 DIAGNOSIS — G30.1 LATE ONSET ALZHEIMER'S DEMENTIA WITHOUT BEHAVIORAL DISTURBANCE (HCC): ICD-10-CM

## 2023-07-07 DIAGNOSIS — F02.80 LATE ONSET ALZHEIMER'S DEMENTIA WITHOUT BEHAVIORAL DISTURBANCE (HCC): ICD-10-CM

## 2023-07-07 RX ORDER — MEMANTINE HYDROCHLORIDE 10 MG/1
TABLET ORAL
Qty: 180 TABLET | Refills: 6 | Status: SHIPPED | OUTPATIENT
Start: 2023-07-07

## 2023-10-16 ENCOUNTER — TELEPHONE (OUTPATIENT)
Dept: NEUROLOGY | Facility: CLINIC | Age: 83
End: 2023-10-16

## 2023-10-16 NOTE — TELEPHONE ENCOUNTER
left voicemail 10/13/2023 stating patient had been up all night, very agitated and talking to people that aren't there. Looking for medication to help calm patient.    Call returned to  [place], [phone]. Symptoms continue, worsen through night. Gradual progression over the past month. Significant agitation that seems to come from anxiety. Visual and auditory hallucinations. Also noting patient has had a significant weight loss despite eating regular meals and seeming to have an appetite.    Any thoughts?

## 2023-10-17 NOTE — TELEPHONE ENCOUNTER
"10/13 2:02    Pt's  left a VM re: pt having \"Sundowning.\"    Transcribed VM:   Call this morning for my wife, Anupam Calzada. Date of birth [birth year]. She is a patient of Dr. Schultz. She has with a notice knowing i was wondering if you would describe some type of medication to help her review for this anxiety stuff at night. Please call me at [phone]. Thank you.    Already addressed below.  "

## 2023-10-23 ENCOUNTER — APPOINTMENT (EMERGENCY)
Dept: CT IMAGING | Facility: HOSPITAL | Age: 83
End: 2023-10-23
Payer: COMMERCIAL

## 2023-10-23 ENCOUNTER — HOSPITAL ENCOUNTER (EMERGENCY)
Facility: HOSPITAL | Age: 83
Discharge: HOME | End: 2023-10-23
Attending: EMERGENCY MEDICINE | Admitting: EMERGENCY MEDICINE
Payer: COMMERCIAL

## 2023-10-23 VITALS
BODY MASS INDEX: 21.05 KG/M2 | DIASTOLIC BLOOD PRESSURE: 72 MMHG | RESPIRATION RATE: 16 BRPM | HEART RATE: 106 BPM | TEMPERATURE: 97.2 F | SYSTOLIC BLOOD PRESSURE: 166 MMHG | WEIGHT: 129.41 LBS | OXYGEN SATURATION: 100 %

## 2023-10-23 DIAGNOSIS — N39.0 UTI (URINARY TRACT INFECTION): Primary | ICD-10-CM
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
## 2023-10-23 DIAGNOSIS — S09.8XXA BLUNT HEAD TRAUMA: ICD-10-CM

## 2023-10-23 LAB
ALBUMIN SERPL BCP-MCNC: 4.1 G/DL (ref 3.5–5)
ALP SERPL-CCNC: 48 U/L (ref 34–104)
ALT SERPL W P-5'-P-CCNC: 8 U/L (ref 7–52)
ANION GAP SERPL CALCULATED.3IONS-SCNC: 9 MMOL/L
AST SERPL W P-5'-P-CCNC: 13 U/L (ref 13–39)
BACTERIA UR QL AUTO: ABNORMAL /HPF
BASOPHILS # BLD AUTO: 0.02 THOUSANDS/ÂΜL (ref 0–0.1)
BASOPHILS NFR BLD AUTO: 0 % (ref 0–1)
BILIRUB SERPL-MCNC: 0.69 MG/DL (ref 0.2–1)
BILIRUB UR QL STRIP: NEGATIVE
BUN SERPL-MCNC: 32 MG/DL (ref 5–25)
CALCIUM SERPL-MCNC: 9.4 MG/DL (ref 8.4–10.2)
CHLORIDE SERPL-SCNC: 106 MMOL/L (ref 96–108)
CLARITY UR: CLEAR
CO2 SERPL-SCNC: 26 MMOL/L (ref 21–32)
COLOR UR: ABNORMAL
CREAT SERPL-MCNC: 0.92 MG/DL (ref 0.6–1.3)
EOSINOPHIL # BLD AUTO: 0.04 THOUSAND/ÂΜL (ref 0–0.61)
EOSINOPHIL NFR BLD AUTO: 1 % (ref 0–6)
ERYTHROCYTE [DISTWIDTH] IN BLOOD BY AUTOMATED COUNT: 12.8 % (ref 11.6–15.1)
GFR SERPL CREATININE-BSD FRML MDRD: 58 ML/MIN/1.73SQ M
GLUCOSE SERPL-MCNC: 116 MG/DL (ref 65–140)
GLUCOSE UR STRIP-MCNC: NEGATIVE MG/DL
HCT VFR BLD AUTO: 39.5 % (ref 34.8–46.1)
HGB BLD-MCNC: 13 G/DL (ref 11.5–15.4)
HGB UR QL STRIP.AUTO: NEGATIVE
HYALINE CASTS #/AREA URNS LPF: ABNORMAL /LPF
IMM GRANULOCYTES # BLD AUTO: 0.02 THOUSAND/UL (ref 0–0.2)
IMM GRANULOCYTES NFR BLD AUTO: 0 % (ref 0–2)
KETONES UR STRIP-MCNC: NEGATIVE MG/DL
LEUKOCYTE ESTERASE UR QL STRIP: ABNORMAL
LYMPHOCYTES # BLD AUTO: 1.58 THOUSANDS/ÂΜL (ref 0.6–4.47)
LYMPHOCYTES NFR BLD AUTO: 25 % (ref 14–44)
MCH RBC QN AUTO: 31.1 PG (ref 26.8–34.3)
MCHC RBC AUTO-ENTMCNC: 32.9 G/DL (ref 31.4–37.4)
MCV RBC AUTO: 95 FL (ref 82–98)
MONOCYTES # BLD AUTO: 0.59 THOUSAND/ÂΜL (ref 0.17–1.22)
MONOCYTES NFR BLD AUTO: 9 % (ref 4–12)
MUCOUS THREADS UR QL AUTO: ABNORMAL
NEUTROPHILS # BLD AUTO: 4.21 THOUSANDS/ÂΜL (ref 1.85–7.62)
NEUTS SEG NFR BLD AUTO: 65 % (ref 43–75)
NITRITE UR QL STRIP: NEGATIVE
NON-SQ EPI CELLS URNS QL MICRO: ABNORMAL /HPF
NRBC BLD AUTO-RTO: 0 /100 WBCS
PH UR STRIP.AUTO: 7 [PH]
PLATELET # BLD AUTO: 123 THOUSANDS/UL (ref 149–390)
PMV BLD AUTO: 11 FL (ref 8.9–12.7)
POTASSIUM SERPL-SCNC: 3.6 MMOL/L (ref 3.5–5.3)
PROT SERPL-MCNC: 6.3 G/DL (ref 6.4–8.4)
PROT UR STRIP-MCNC: ABNORMAL MG/DL
RBC # BLD AUTO: 4.18 MILLION/UL (ref 3.81–5.12)
RBC #/AREA URNS AUTO: ABNORMAL /HPF
SODIUM SERPL-SCNC: 141 MMOL/L (ref 135–147)
SP GR UR STRIP.AUTO: 1.01 (ref 1–1.03)
UROBILINOGEN UR STRIP-ACNC: <2 MG/DL
WBC # BLD AUTO: 6.46 THOUSAND/UL (ref 4.31–10.16)
WBC #/AREA URNS AUTO: ABNORMAL /HPF

## 2023-10-23 PROCEDURE — 85025 COMPLETE CBC W/AUTO DIFF WBC: CPT

## 2023-10-23 PROCEDURE — G1004 CDSM NDSC: HCPCS

## 2023-10-23 PROCEDURE — 80053 COMPREHEN METABOLIC PANEL: CPT

## 2023-10-23 PROCEDURE — 87086 URINE CULTURE/COLONY COUNT: CPT

## 2023-10-23 PROCEDURE — 81001 URINALYSIS AUTO W/SCOPE: CPT

## 2023-10-23 PROCEDURE — 99285 EMERGENCY DEPT VISIT HI MDM: CPT

## 2023-10-23 PROCEDURE — 36415 COLL VENOUS BLD VENIPUNCTURE: CPT

## 2023-10-23 PROCEDURE — 70450 CT HEAD/BRAIN W/O DYE: CPT

## 2023-10-23 RX ORDER — CEPHALEXIN 500 MG/1
500 CAPSULE ORAL EVERY 12 HOURS SCHEDULED
Qty: 14 CAPSULE | Refills: 0 | Status: SHIPPED | OUTPATIENT
Start: 2023-10-23 | End: 2023-10-30

## 2023-10-23 RX ORDER — CEPHALEXIN 250 MG/1
500 CAPSULE ORAL ONCE
Status: DISCONTINUED | OUTPATIENT
Start: 2023-10-23 | End: 2023-10-23 | Stop reason: HOSPADM

## 2023-10-23 RX ORDER — CEPHALEXIN 500 MG/1
500 CAPSULE ORAL EVERY 12 HOURS SCHEDULED
Qty: 14 CAPSULE | Refills: 0 | Status: SHIPPED | OUTPATIENT
Start: 2023-10-23 | End: 2023-10-23

## 2023-10-23 RX ORDER — OLANZAPINE 10 MG/2ML
10 INJECTION, POWDER, FOR SOLUTION INTRAMUSCULAR ONCE
Status: DISCONTINUED | OUTPATIENT
Start: 2023-10-23 | End: 2023-10-23

## 2023-10-23 NOTE — DISCHARGE INSTRUCTIONS
Please make sure to follow-up with your primary care physician within 1 week for reevaluation of symptoms. Please make sure to continue the antibiotic as prescribed to treat your urinary tract infection. If you develop any worsening symptoms including confusion, fever, severe back pain, intractable nausea/vomiting, abdominal pain, or any other concerning symptoms, please return to the emergency department as has been signs of worsening illness.

## 2023-10-23 NOTE — ED ATTENDING ATTESTATION
10/23/2023  I, Christo Askew MD, saw and evaluated the patient. I have discussed the patient with the resident/non-physician practitioner and agree with the resident's/non-physician practitioner's findings, Plan of Care, and MDM as documented in the resident's/non-physician practitioner's note, except where noted. All available labs and Radiology studies were reviewed. I was present for key portions of any procedure(s) performed by the resident/non-physician practitioner and I was immediately available to provide assistance. At this point I agree with the current assessment done in the Emergency Department. I have conducted an independent evaluation of this patient a history and physical is as follows:    ED Course  ED Course as of 10/23/23 0643   Mon Oct 23, 2023   0405 83F sent by EMS for evaluation of aggressive behavior. Past medical history significant for dementia. Over the past several days, patient has had increasing behavioral problems. Of note, patient had a mechanical fall 5 days ago. She was walking up the stairs, tripped, fell onto her face. She was wearing glasses at the time. No head trauma, no LOC. Patient was able to walk immediately after the accident. Patient had no complaints, reportedly to her . Is been brings her in now due to increasing agitation at home. No other symptoms otherwise. No fever. Her vital signs on arrival notable for tachycardia.   0405 Pulse(!): 106   0438 Her physical exam was remarkable for a confused older woman with last shaped abrasions on her face with surrounding ecchymoses. Patient alert, but not fully oriented. No other findings on physical exam side from tachycardia on auscultation. Her differential diagnosis includes intracranial bleed, skull fracture, facial fracture, electrolyte imbalance, urinary tract infection, sepsis, other infection hypoglycemia. Lab work and imaging was ordered.    0535 CT head without contrast  No acute findings. 0535 BUN(!): 32  Slightly elevated   0536 CBC, CMP otherwise grossly unremarkable. 0536 Heart rate at bedside 101.   0559 On reassessment, patient remains at baseline. UA still pending. 0641 RBC, UA(!): 2-4   0641 WBC, UA(!): 10-20   0641 Bacteria, UA(!): Moderate   0641 Was treated for urinary tract infection. 0117 Patient remained stable and comfortable at this time. Recommended that patient follow-up with primary care physician following treatment of urinary tract infection.  verbalized understanding and will follow-up as an outpatient. Strict return precautions given. Upon discharge, patient fully alert, oriented to baseline, without further complaints.          Critical Care Time  Procedures

## 2023-10-23 NOTE — ED PROVIDER NOTES
History  Chief Complaint   Patient presents with    Medical Problem     Patient comes from home. Patient increasingly agitated. had a fall last week. 37-year-old female with history of severe dementia, hypertension, presents via EMS after  called due to increasing agitation and "sundowning."  On arrival to the ED, patient was confused and tangentially speaking. Having difficulty finding words sometimes. She did have glasses shaped bruising and abrasions around her eyes for which she admits to falling. Speaking with , he states she tripped up steps and struck her face. She was never evaluated after this fall. History from patient is severely limited due to dementia. Prior to Admission Medications   Prescriptions Last Dose Informant Patient Reported? Taking?    Ascorbic Acid (vitamin C) 100 MG tablet   Yes No   Sig: Take 100 mg by mouth daily   Cyanocobalamin ([facility] VITAMIN B12 PO)   Yes No   Sig: Take 1,200 mg by mouth in the morning   Propylene Glycol 0.6 % SOLN   Yes No   Sig: Apply to eye as needed   Patient not taking: No sig reported   cholecalciferol (VITAMIN D3) 400 units tablet   Yes No   Sig: Take 400 Units by mouth daily   felodipine (PLENDIL) 10 MG 24 hr tablet   Yes No   Sig: Take 10 mg by mouth daily   lisinopril-hydrochlorothiazide (PRINZIDE,ZESTORETIC) 20-25 MG per tablet   Yes No   Sig: Take 1 tablet by mouth daily   memantine (NAMENDA) 10 mg tablet   No No   Sig: TAKE 1 TABLET BY MOUTH TWICE A DAY   simvastatin (ZOCOR) 40 mg tablet   Yes No   Sig: Take 40 mg by mouth daily      Facility-Administered Medications: None       Past Medical History:   Diagnosis Date    Dementia ([address])     Hypertension        Past Surgical History:   Procedure Laterality Date    COLONOSCOPY      DILATION AND CURETTAGE OF UTERUS      MN ANTERIOR COLPORRAPHY RPR CYSTOCELE W/CYSTO N/A 1/21/2020    Procedure: COLPORRHAPHY;  Surgeon: Roberto Dawn MD;  Location: [facility];  Service: Gynecology    MN VAGINAL HYSTERECTOMY UTERUS 250 GM/< N/A 1/21/2020    Procedure: HYSTERECTOMY VAGINAL TOTAL (TVH) left salpingectomy;  Surgeon: Judge Brett MD;  Location: [facility];  Service: Gynecology    WRIST FRACTURE SURGERY Right        History reviewed. No pertinent family history. I have reviewed and agree with the history as documented. E-Cigarette/Vaping    E-Cigarette Use Never User      E-Cigarette/Vaping Substances     Social History     Tobacco Use    Smoking status: Never    Smokeless tobacco: Never   Vaping Use    Vaping Use: Never used   Substance Use Topics    Alcohol use: Not Currently    Drug use: Never        Review of Systems   Unable to perform ROS: Dementia       Physical Exam  ED Triage Vitals [10/23/23 0403]   Temperature Pulse Respirations Blood Pressure SpO2   (!) 97.2 °F (36.2 °C) (!) 106 16 166/72 100 %      Temp Source Heart Rate Source Patient Position - Orthostatic VS BP Location FiO2 (%)   Oral Monitor Sitting Right arm --      Pain Score       --             Orthostatic Vital Signs  Vitals:    10/23/23 0403   BP: 166/72   Pulse: (!) 106   Patient Position - Orthostatic VS: Sitting       Physical Exam  Vitals and nursing note reviewed. Constitutional:       General: She is not in acute distress. HENT:      Head: Normocephalic. Right Ear: External ear normal.      Left Ear: External ear normal.      Nose: No congestion or rhinorrhea. Mouth/Throat:      Mouth: Mucous membranes are moist.      Pharynx: Oropharynx is clear. Eyes:      General: No scleral icterus. Extraocular Movements: Extraocular movements intact. Cardiovascular:      Rate and Rhythm: Regular rhythm. Tachycardia present. Pulses: Normal pulses. Heart sounds: Normal heart sounds. Pulmonary:      Effort: Pulmonary effort is normal.      Breath sounds: Normal breath sounds. Abdominal:      Palpations: Abdomen is soft. Tenderness: There is no abdominal tenderness.    Musculoskeletal: General: Normal range of motion. Cervical back: Normal range of motion. Skin:     General: Skin is warm and dry. Findings: Bruising (Around both eyes and bridge of nose) present. Neurological:      General: No focal deficit present. Mental Status: She is alert and oriented to person, place, and time. Psychiatric:         Mood and Affect: Mood normal.         Behavior: Behavior normal.         ED Medications  Medications   cephalexin (KEFLEX) capsule 500 mg (500 mg Oral Not Given 10/23/23 0702)         Diagnostic Studies  Results Reviewed       Procedure Component Value Units Date/Time    Urine Microscopic [[id number]]  (Abnormal) Collected: 10/23/23 0607    Lab Status: Final result Specimen: Urine, Clean Catch Updated: 10/23/23 0635     RBC, UA 2-4 /hpf      WBC, UA 10-20 /hpf      Epithelial Cells Occasional /hpf      Bacteria, UA Moderate /hpf      MUCUS THREADS Occasional     Hyaline Casts, UA 5-10 /lpf     Urine culture [[id number]] Collected: 10/23/23 5283    Lab Status:  In process Specimen: Urine, Clean Catch Updated: 10/23/23 0635    UA w Reflex to Microscopic w Reflex to Culture [[id number]]  (Abnormal) Collected: 10/23/23 0607    Lab Status: Final result Specimen: Urine, Clean Catch Updated: 10/23/23 2847     Color, UA Light Yellow     Clarity, UA Clear     Specific Gravity, UA 1.015     pH, UA 7.0     Leukocytes, UA Large     Nitrite, UA Negative     Protein, UA Trace mg/dl      Glucose, UA Negative mg/dl      Ketones, UA Negative mg/dl      Urobilinogen, UA <2.0 mg/dl      Bilirubin, UA Negative     Occult Blood, UA Negative    Comprehensive metabolic panel [[id number]]  (Abnormal) Collected: 10/23/23 0501    Lab Status: Final result Specimen: Blood from Hand, Right Updated: 10/23/23 0504     Sodium 141 mmol/L      Potassium 3.6 mmol/L      Chloride 106 mmol/L      CO2 26 mmol/L      ANION GAP 9 mmol/L      BUN 32 mg/dL      Creatinine 0.92 mg/dL      Glucose 116 mg/dL      Calcium 9.4 mg/dL      AST 13 U/L      ALT 8 U/L      Alkaline Phosphatase 48 U/L      Total Protein 6.3 g/dL      Albumin 4.1 g/dL      Total Bilirubin 0.69 mg/dL      eGFR 58 ml/min/1.73sq m     Narrative:      [facility] guidelines for Chronic Kidney Disease (CKD):     Stage 1 with normal or high GFR (GFR > 90 mL/min/1.73 square meters)    Stage 2 Mild CKD (GFR = 60-89 mL/min/1.73 square meters)    Stage 3A Moderate CKD (GFR = 45-59 mL/min/1.73 square meters)    Stage 3B Moderate CKD (GFR = 30-44 mL/min/1.73 square meters)    Stage 4 Severe CKD (GFR = 15-29 mL/min/1.73 square meters)    Stage 5 End Stage CKD (GFR <15 mL/min/1.73 square meters)  Note: GFR calculation is accurate only with a steady state creatinine    CBC and differential [[id number]]  (Abnormal) Collected: 10/23/23 0501    Lab Status: Final result Specimen: Blood from Hand, Right Updated: 10/23/23 0531     WBC 6.46 Thousand/uL      RBC 4.18 Million/uL      Hemoglobin 13.0 g/dL      Hematocrit 39.5 %      MCV 95 fL      MCH 31.1 pg      MCHC 32.9 g/dL      RDW 12.8 %      MPV 11.0 fL      Platelets 401 Thousands/uL      nRBC 0 /100 WBCs      Neutrophils Relative 65 %      Immat GRANS % 0 %      Lymphocytes Relative 25 %      Monocytes Relative 9 %      Eosinophils Relative 1 %      Basophils Relative 0 %      Neutrophils Absolute 4.21 Thousands/µL      Immature Grans Absolute 0.02 Thousand/uL      Lymphocytes Absolute 1.58 Thousands/µL      Monocytes Absolute 0.59 Thousand/µL      Eosinophils Absolute 0.04 Thousand/µL      Basophils Absolute 0.02 Thousands/µL                    CT head without contrast   Final Result by Balwinder Pierce DO (10/23 5143)      No acute intracranial abnormality. Workstation performed: [id number]               Procedures  Procedures      ED Course  ED Course as of 10/23/23 0703   Mon Oct 23, 2023   0539 CBC, CMP within normal limits and reassuring with no signs of endorgan damage.    6752 CT head without contrast  No acute intracranial abnormalities. SBIRT 20yo+      Flowsheet Row Most Recent Value   Initial Alcohol Screen: US AUDIT-C     1. How often do you have a drink containing alcohol? 0 Filed at: 10/23/2023 0404   2. How many drinks containing alcohol do you have on a typical day you are drinking? 0 Filed at: 10/23/2023 0404   3b. FEMALE Any Age, or MALE 65+: How often do you have 4 or more drinks on one occassion? 0 Filed at: 10/23/2023 0404   Audit-C Score 0 Filed at: 10/23/2023 0404   LORY: How many times in the past year have you. .. Used an illegal drug or used a prescription medication for non-medical reasons? Never Filed at: 10/23/2023 0404                  Medical Decision Making  Draining was brought to the ER by  due to increasing agitation, especially at nighttime. Patient did have a fall a few nights ago while tripping up a step. She had bruising around her eyes and a glasses distribution. History unable to be obtained from patient due to dementia. CT of head was negative for any acute abnormalities. Labs were largely unremarkable. UA showed signs of infection which could exacerbate the symptoms. Patient prescribed outpatient course of Keflex. At this time, it was discussed with  and patient is stable for discharge and it was discussed that patient might benefit from being placed in a facility.  understood and agreed to look into it after being discharged. Patient discharge stable condition. Amount and/or Complexity of Data Reviewed  Labs: ordered. Radiology: ordered. Decision-making details documented in ED Course. Risk  Prescription drug management.           Disposition  Final diagnoses:   UTI (urinary tract infection)   Blunt head trauma   Fall (on) (from) other stairs and steps, initial encounter     Time reflects when diagnosis was documented in both MDM as applicable and the Disposition within this note       Time User Action Codes Description Comment    10/23/2023  6:41 AM Tam MENA Add [N39.0] UTI (urinary tract infection)     10/23/2023  6:43 AM Teagan Chaudhryrigan Add [S09. 8XXA] Blunt head trauma     10/23/2023  6:43 AM Teagan Chaudhryrigan Add [W10.8XXA] Fall (on) (from) other stairs and steps, initial encounter           ED Disposition       ED Disposition   Discharge    Condition   Stable    Date/Time   Mon Oct 23, 2023 7[facility] discharge to home/self care. Follow-up Information       Follow up With Specialties Details Why Hennie Sicard, MD Internal Medicine Schedule an appointment as soon as possible for a visit in 1 week For reassessment [address] [facility] Emergency Department Emergency Medicine Go to  If symptoms worsen or do not resolve [address] [address] [facility] Emergency Department, [place], [place], [address]            Discharge Medication List as of 10/23/2023  6:48 AM        START taking these medications    Details   cephalexin (KEFLEX) 500 mg capsule Take 1 capsule (500 mg total) by mouth every 12 (twelve) hours for 7 days, Starting Mon 10/23/2023, Until Mon 10/30/2023, Normal           CONTINUE these medications which have NOT CHANGED    Details   Ascorbic Acid (vitamin C) 100 MG tablet Take 100 mg by mouth daily, Historical Med      cholecalciferol (VITAMIN D3) 400 units tablet Take 400 Units by mouth daily, Historical Med      Cyanocobalamin ([facility] VITAMIN B12 PO) Take 1,200 mg by mouth in the morning, Historical Med      felodipine (PLENDIL) 10 MG 24 hr tablet Take 10 mg by mouth daily, Starting Fri 5/31/2019, Historical Med      lisinopril-hydrochlorothiazide (PRINZIDE,ZESTORETIC) 20-25 MG per tablet Take 1 tablet by mouth daily, Starting Fri 5/31/2019, Historical Med memantine (NAMENDA) 10 mg tablet TAKE 1 TABLET BY MOUTH TWICE A DAY, Normal      Propylene Glycol 0.6 % SOLN Apply to eye as needed, Historical Med      simvastatin (ZOCOR) 40 mg tablet Take 40 mg by mouth daily, Starting Sat 4/27/2019, Historical Med           No discharge procedures on file. PDMP Review       None             ED Provider  Attending physically available and evaluated Sharif Medel. I managed the patient along with the ED Attending.     Electronically Signed by           Michael Wheat DO  10/23/23 1636

## 2023-10-24 LAB — BACTERIA UR CULT: NORMAL

## 2023-10-31 ENCOUNTER — TELEPHONE (OUTPATIENT)
Dept: NEUROLOGY | Facility: CLINIC | Age: 83
End: 2023-10-31

## 2023-10-31 ENCOUNTER — OFFICE VISIT (OUTPATIENT)
Dept: NEUROLOGY | Facility: CLINIC | Age: 83
End: 2023-10-31
Payer: COMMERCIAL

## 2023-10-31 VITALS
WEIGHT: 124 LBS | HEART RATE: 71 BPM | DIASTOLIC BLOOD PRESSURE: 70 MMHG | HEIGHT: 65 IN | BODY MASS INDEX: 20.66 KG/M2 | SYSTOLIC BLOOD PRESSURE: 110 MMHG

## 2023-10-31 DIAGNOSIS — G30.9 ALZHEIMER'S DEMENTIA WITH BEHAVIORAL DISTURBANCE (HCC): Primary | ICD-10-CM

## 2023-10-31 DIAGNOSIS — K59.00 CONSTIPATION: ICD-10-CM

## 2023-10-31 DIAGNOSIS — F02.818 ALZHEIMER'S DEMENTIA WITH BEHAVIORAL DISTURBANCE (HCC): Primary | ICD-10-CM

## 2023-10-31 PROCEDURE — 99214 OFFICE O/P EST MOD 30 MIN: CPT | Performed by: PSYCHIATRY & NEUROLOGY

## 2023-10-31 RX ORDER — QUETIAPINE FUMARATE 25 MG/1
TABLET, FILM COATED ORAL
Qty: 30 TABLET | Refills: 5 | Status: SHIPPED | OUTPATIENT
Start: 2023-10-31

## 2023-10-31 RX ORDER — CIPROFLOXACIN 500 MG/1
TABLET, FILM COATED ORAL
COMMUNITY
Start: 2023-10-30 | End: 2023-11-02

## 2023-10-31 NOTE — TELEPHONE ENCOUNTER
----- Message from Jacklyn Chowdhury DO sent at 10/31/2023  3:44 PM EDT -----  Are you able to call the patients  as he has question about placement or assistance in the house for the patient. Thank you so much!

## 2023-10-31 NOTE — PROGRESS NOTES
Patient ID: Angelica Reynaga is a 82 y.o. female. Assessment/Plan:  In summary, Angelica Reynaga is a 82 y.o. female who presented for follow up for with memory changes and has an exam notable for significant impairment in cognition and memory, at a previous visit the patient had a MoCA score of 8. Previous imaging was not positive for any signs of dementia radiographically. Pt has alzheimer's disease.    - Will start the patient on Seroquel, 12.5mg at night, they may also take 12.5mg in the morning as needed. Discussed with the  the black box warning and the fact it has not been studied in dementia patient and the potential risks, family elected to proceed  - Pts  instructed to call her PCP regarding the fact that the pt has not had a bowel movement in 2 weeks or to be evaluated by the ER  - The patient is to discontinue Namenda as  feels it has no benefit  - - Discussed the current impressions, and provided patient and family education  - Discussed proper use, possible side effects and risks of treatments  - Discussed instructions for management, importance of tx compliance, and risk factor reduction  - All questions were address and patient/guardian verbalized understanding  - Pt advised to call the office with any questions of concerns  - Pt is to follow up with us  - Patient was seen and discussed with Dr. Adithya Jaimes      Diagnoses and all orders for this visit:    Alzheimer's dementia with behavioral disturbance (HCC)  -     QUEtiapine (SEROquel) 25 mg tablet; Start by taking 12.5mg at night, as need you may also take 12.5mg in the morning as needed. Other orders  -     Discontinue: ciprofloxacin (CIPRO) 500 mg tablet           Subjective:  Angelica Reynaga is a 82 y.o. female with a PMHx of dementia who presents today for follow-up. The pt was last seen November 2022.  Since the last visit the patient's  and daughter report that the patient has had a progressive worsening of her symptoms. They report that she has had issues holding conversations, she is started speaking gibberish, she has been having hallucinations. The patient's family also reports that she has been having symptoms of agitation and behavioral concerns over the last 2 to 3 months. They deny having any safety concerns at home. They deny any violence. The patient is reported to have started crying a lot. They report that on [place] 10/22/2023 agititated and had UTI it was ultimately found out. The patient has not had a bowel movement in 2 weeks. Pt is still eating. Very decreased fluid intake. Pt has frequent mood swings. Pt has sleep disruption. Pt is unable to express herself. Makes up words. The patient denies any abdominal pain, headache, dizziness, nausea, vomiting, diarrhea, chest pain, palpitations, shortness of breath, wheezing. A 12 point review of systems was completed and was negative unless noted above. Information obtained from patient,  and daughter. To review from previous documentation:  11/22  Joey Hawthorne is a 80 y.o. female with PMHx of dementia who presents today for follow-up for memory changes. Since the last visit the patient has not had any significant changes for reported by either the patient or her . The patient is unable to cook for herself, but is able to eat by herself. The patient has no wondering, inappropriate behavior is or hallucinations. The patient is able to be left alone for brief periods of time. The patient keeps busy by doing puzzles and coloring.     6/22  Joey Hawthorne is a 80 y.o. female with PMHx of dementia who presents today for follow-up for memory changes. The patient is staying active she is not having any issues eating, dressing, remembering the names of her kids or grand kids. The patient is not reading as much as she used to or doing as much needlepoint work as previous.   We discussed the importance of maintaining an active lifestyle. The patient's  does not have any safety concerns including leaving appliances on were wondering for example. Lizz Alonzo is a 80 y.o. female with PMHx of HTN and HLD who presents today for evalation of memory changes. The patient reports that his memory issues repeating questions, remembering new information, can't remember dates, times, appointments, some issues with names (can know she knows a person but cant place them). Primarily issues with short term memory. Denies issues getting lost, forgetting how to use things and reports these issues have been going on for 3-4 years. Reports frequent nap taking. Short attention span. Has been on dinazapil 5mg for about a year and no effect has been noted. The patient denies a family history of memory issues. The patient denies a history of mood issues. The patient reports that he on average sleeps 9 hours a night. no problems falling asleep, no problems staying asleep, no snoring while asleep. History of ADHD/learning disability (reading, spelling, foreign languages): No  History of head trauma: No  Driving: Stopped driving, she reports she did not like driving anymore. No accidents. She has issues balancing a checkbook. She no longer handles bills, her  does this.    Help with taking medication:  does it but she could do it alone  Help preparing meals: Some, she reports issues with measurements  Help with eating: No  Help with house hold tasks: No  Help with bathing and showering: No   Help with dressing: No     High school education  Clerical work previously, now retired  Alcohol: Drinks wine, periodically  Illicit substances: Denies     Social norms: Denies any issues  Leaving appliances on: No  Hallucinations: None  Tremor, slowness, stiffness: Denies   Paranoia: No    The following portions of the patient's history were reviewed and updated as appropriate: allergies, current medications, past family history, past medical history, past social history, past surgical history, and problem list.     Objective:    Blood pressure 110/70, pulse 71, height 5' 5" (1.651 m), weight 56.2 kg (124 lb), not currently breastfeeding. Physical Exam  Vitals and nursing note reviewed. Constitutional: Alert. Not in acute distress. Not ill-appearing, toxic-appearing or diaphoretic. HENT: Normocephalic and atraumatic. Nose and Ears normal.    Eyes: No scleral icterus. No discharge. Neck: Neck Supple. ROM normal.   Cardiovascular: Distal extremities warm without palpable edema or tenderness, no observed significant swelling. Pulmonary:  Pulmonary effort is normal. Not in respiratory distress   Abdominal: Abdomen is flat and not distended. Bowel sounds present. No tenderness. No rigidity. Musculoskeletal: No swelling or deformity. Skin: Warm and dry   Psychiatric:  Normal behavior and appropriate affect      Neurological Exam  Mental Status  Awake and alert. Memory is impaired. Speech is normal. Language is fluent with no aphasia. Cranial Nerves  CN II: Visual fields full to confrontation. CN III, IV, VI: Extraocular movements intact bilaterally. Normal lids and orbits bilaterally. Pupils equal round and reactive to light bilaterally. CN V: Facial sensation is normal.  CN VII: Full and symmetric facial movement. CN VIII: Hearing is normal.  CN IX, X: Palate elevates symmetrically  CN XI: Shoulder shrug strength is normal.  CN XII: Tongue midline without atrophy or fasciculations. Motor  Normal muscle bulk throughout. No fasciculations present. Normal muscle tone. No abnormal involuntary movements. Strength is 5/5 in all four extremities except as noted. Sensory  Light touch is normal in upper and lower extremities. Reflexes  Deep tendon reflexes are 2+ and symmetric except as noted.     Coordination  Right: Finger-to-nose normal.Left: Finger-to-nose normal.    Gait  Casual gait is normal including stance, stride, and arm swing.

## 2023-11-01 ENCOUNTER — APPOINTMENT (EMERGENCY)
Dept: CT IMAGING | Facility: HOSPITAL | Age: 83
End: 2023-11-01
Payer: COMMERCIAL

## 2023-11-01 ENCOUNTER — HOSPITAL ENCOUNTER (OUTPATIENT)
Facility: HOSPITAL | Age: 83
Setting detail: OBSERVATION
Discharge: HOME | End: 2023-11-02
Attending: EMERGENCY MEDICINE | Admitting: INTERNAL MEDICINE
Payer: COMMERCIAL

## 2023-11-01 DIAGNOSIS — K59.00 CONSTIPATION: ICD-10-CM

## 2023-11-01 DIAGNOSIS — K62.89 ACUTE PROCTITIS: Primary | ICD-10-CM

## 2023-11-01 DIAGNOSIS — N28.89 RIGHT RENAL MASS: ICD-10-CM

## 2023-11-01 PROBLEM — I10 HYPERTENSION: Status: ACTIVE | Noted: 2023-11-01

## 2023-11-01 PROBLEM — R41.82 ALTERED MENTAL STATUS: Status: ACTIVE | Noted: 2023-11-01

## 2023-11-01 PROBLEM — E87.6 HYPOKALEMIA: Status: ACTIVE | Noted: 2023-11-01

## 2023-11-01 PROBLEM — D69.6 THROMBOCYTOPENIA (HCC): Status: ACTIVE | Noted: 2023-11-01

## 2023-11-01 LAB
ALBUMIN SERPL BCP-MCNC: 4 G/DL (ref 3.5–5)
ALP SERPL-CCNC: 45 U/L (ref 34–104)
ALT SERPL W P-5'-P-CCNC: 11 U/L (ref 7–52)
ANION GAP SERPL CALCULATED.3IONS-SCNC: 5 MMOL/L
AST SERPL W P-5'-P-CCNC: 15 U/L (ref 13–39)
BACTERIA UR QL AUTO: ABNORMAL /HPF
BASOPHILS # BLD AUTO: 0.01 THOUSANDS/ÂΜL (ref 0–0.1)
BASOPHILS NFR BLD AUTO: 0 % (ref 0–1)
BILIRUB SERPL-MCNC: 0.64 MG/DL (ref 0.2–1)
BILIRUB UR QL STRIP: NEGATIVE
BUN SERPL-MCNC: 29 MG/DL (ref 5–25)
CALCIUM SERPL-MCNC: 8.9 MG/DL (ref 8.4–10.2)
CHLORIDE SERPL-SCNC: 105 MMOL/L (ref 96–108)
CLARITY UR: CLEAR
CO2 SERPL-SCNC: 31 MMOL/L (ref 21–32)
COLOR UR: ABNORMAL
CREAT SERPL-MCNC: 0.82 MG/DL (ref 0.6–1.3)
EOSINOPHIL # BLD AUTO: 0.03 THOUSAND/ÂΜL (ref 0–0.61)
EOSINOPHIL NFR BLD AUTO: 1 % (ref 0–6)
ERYTHROCYTE [DISTWIDTH] IN BLOOD BY AUTOMATED COUNT: 12.8 % (ref 11.6–15.1)
GFR SERPL CREATININE-BSD FRML MDRD: 67 ML/MIN/1.73SQ M
GLUCOSE SERPL-MCNC: 87 MG/DL (ref 65–140)
GLUCOSE UR STRIP-MCNC: NEGATIVE MG/DL
HCT VFR BLD AUTO: 35 % (ref 34.8–46.1)
HGB BLD-MCNC: 11.7 G/DL (ref 11.5–15.4)
HGB UR QL STRIP.AUTO: NEGATIVE
HYALINE CASTS #/AREA URNS LPF: ABNORMAL /LPF
IMM GRANULOCYTES # BLD AUTO: 0.03 THOUSAND/UL (ref 0–0.2)
IMM GRANULOCYTES NFR BLD AUTO: 1 % (ref 0–2)
KETONES UR STRIP-MCNC: ABNORMAL MG/DL
LEUKOCYTE ESTERASE UR QL STRIP: NEGATIVE
LIPASE SERPL-CCNC: 25 U/L (ref 11–82)
LYMPHOCYTES # BLD AUTO: 1.59 THOUSANDS/ÂΜL (ref 0.6–4.47)
LYMPHOCYTES NFR BLD AUTO: 29 % (ref 14–44)
MCH RBC QN AUTO: 31 PG (ref 26.8–34.3)
MCHC RBC AUTO-ENTMCNC: 33.4 G/DL (ref 31.4–37.4)
MCV RBC AUTO: 93 FL (ref 82–98)
MONOCYTES # BLD AUTO: 0.45 THOUSAND/ÂΜL (ref 0.17–1.22)
MONOCYTES NFR BLD AUTO: 8 % (ref 4–12)
NEUTROPHILS # BLD AUTO: 3.34 THOUSANDS/ÂΜL (ref 1.85–7.62)
NEUTS SEG NFR BLD AUTO: 61 % (ref 43–75)
NITRITE UR QL STRIP: NEGATIVE
NON-SQ EPI CELLS URNS QL MICRO: ABNORMAL /HPF
NRBC BLD AUTO-RTO: 0 /100 WBCS
PH UR STRIP.AUTO: 6 [PH]
PLATELET # BLD AUTO: 118 THOUSANDS/UL (ref 149–390)
PMV BLD AUTO: 11 FL (ref 8.9–12.7)
POTASSIUM SERPL-SCNC: 3.4 MMOL/L (ref 3.5–5.3)
PROT SERPL-MCNC: 5.7 G/DL (ref 6.4–8.4)
PROT UR STRIP-MCNC: ABNORMAL MG/DL
RBC # BLD AUTO: 3.78 MILLION/UL (ref 3.81–5.12)
RBC #/AREA URNS AUTO: ABNORMAL /HPF
SODIUM SERPL-SCNC: 141 MMOL/L (ref 135–147)
SP GR UR STRIP.AUTO: 1.03 (ref 1–1.03)
UROBILINOGEN UR STRIP-ACNC: <2 MG/DL
WBC # BLD AUTO: 5.45 THOUSAND/UL (ref 4.31–10.16)
WBC #/AREA URNS AUTO: ABNORMAL /HPF

## 2023-11-01 PROCEDURE — 74177 CT ABD & PELVIS W/CONTRAST: CPT

## 2023-11-01 PROCEDURE — 99285 EMERGENCY DEPT VISIT HI MDM: CPT | Performed by: EMERGENCY MEDICINE

## 2023-11-01 PROCEDURE — 83690 ASSAY OF LIPASE: CPT

## 2023-11-01 PROCEDURE — 99222 1ST HOSP IP/OBS MODERATE 55: CPT | Performed by: INTERNAL MEDICINE

## 2023-11-01 PROCEDURE — 96360 HYDRATION IV INFUSION INIT: CPT

## 2023-11-01 PROCEDURE — 81001 URINALYSIS AUTO W/SCOPE: CPT

## 2023-11-01 PROCEDURE — 80053 COMPREHEN METABOLIC PANEL: CPT

## 2023-11-01 PROCEDURE — G1004 CDSM NDSC: HCPCS

## 2023-11-01 PROCEDURE — 36415 COLL VENOUS BLD VENIPUNCTURE: CPT

## 2023-11-01 PROCEDURE — 99283 EMERGENCY DEPT VISIT LOW MDM: CPT

## 2023-11-01 PROCEDURE — 85025 COMPLETE CBC W/AUTO DIFF WBC: CPT

## 2023-11-01 RX ORDER — ENOXAPARIN SODIUM 100 MG/ML
40 INJECTION SUBCUTANEOUS DAILY
Status: DISCONTINUED | OUTPATIENT
Start: 2023-11-01 | End: 2023-11-02 | Stop reason: HOSPADM

## 2023-11-01 RX ORDER — POLYETHYLENE GLYCOL 3350 17 G/17G
17 POWDER, FOR SOLUTION ORAL DAILY
Status: DISCONTINUED | OUTPATIENT
Start: 2023-11-01 | End: 2023-11-02 | Stop reason: HOSPADM

## 2023-11-01 RX ORDER — AMOXICILLIN 250 MG
1 CAPSULE ORAL 2 TIMES DAILY
Status: DISCONTINUED | OUTPATIENT
Start: 2023-11-01 | End: 2023-11-02 | Stop reason: HOSPADM

## 2023-11-01 RX ORDER — POTASSIUM CHLORIDE 20 MEQ/1
40 TABLET, EXTENDED RELEASE ORAL ONCE
Status: DISCONTINUED | OUTPATIENT
Start: 2023-11-01 | End: 2023-11-01

## 2023-11-01 RX ORDER — HYDROCHLOROTHIAZIDE 25 MG/1
25 TABLET ORAL DAILY
Status: DISCONTINUED | OUTPATIENT
Start: 2023-11-02 | End: 2023-11-02 | Stop reason: HOSPADM

## 2023-11-01 RX ORDER — HYDROCHLOROTHIAZIDE 25 MG/1
25 TABLET ORAL ONCE
Status: COMPLETED | OUTPATIENT
Start: 2023-11-01 | End: 2023-11-01

## 2023-11-01 RX ORDER — POTASSIUM CHLORIDE 20MEQ/15ML
40 LIQUID (ML) ORAL ONCE
Status: COMPLETED | OUTPATIENT
Start: 2023-11-01 | End: 2023-11-01

## 2023-11-01 RX ORDER — BISACODYL 10 MG
10 SUPPOSITORY, RECTAL RECTAL DAILY PRN
Status: DISCONTINUED | OUTPATIENT
Start: 2023-11-01 | End: 2023-11-02 | Stop reason: HOSPADM

## 2023-11-01 RX ORDER — ACETAMINOPHEN 325 MG/1
650 TABLET ORAL EVERY 6 HOURS PRN
Status: DISCONTINUED | OUTPATIENT
Start: 2023-11-01 | End: 2023-11-02 | Stop reason: HOSPADM

## 2023-11-01 RX ORDER — MAGNESIUM CARB/ALUMINUM HYDROX 105-160MG
296 TABLET,CHEWABLE ORAL ONCE
Status: COMPLETED | OUTPATIENT
Start: 2023-11-01 | End: 2023-11-01

## 2023-11-01 RX ORDER — FELODIPINE 2.5 MG/1
10 TABLET, EXTENDED RELEASE ORAL ONCE
Status: COMPLETED | OUTPATIENT
Start: 2023-11-01 | End: 2023-11-01

## 2023-11-01 RX ORDER — QUETIAPINE FUMARATE 25 MG/1
12.5 TABLET, FILM COATED ORAL 2 TIMES DAILY
Status: DISCONTINUED | OUTPATIENT
Start: 2023-11-01 | End: 2023-11-02

## 2023-11-01 RX ORDER — LISINOPRIL 20 MG/1
20 TABLET ORAL DAILY
Status: DISCONTINUED | OUTPATIENT
Start: 2023-11-02 | End: 2023-11-02 | Stop reason: HOSPADM

## 2023-11-01 RX ORDER — LISINOPRIL 10 MG/1
20 TABLET ORAL ONCE
Status: COMPLETED | OUTPATIENT
Start: 2023-11-01 | End: 2023-11-01

## 2023-11-01 RX ORDER — PRAVASTATIN SODIUM 80 MG/1
80 TABLET ORAL
Status: DISCONTINUED | OUTPATIENT
Start: 2023-11-01 | End: 2023-11-02 | Stop reason: HOSPADM

## 2023-11-01 RX ADMIN — HYDROCHLOROTHIAZIDE 25 MG: 25 TABLET ORAL at 13:16

## 2023-11-01 RX ADMIN — FELODIPINE 10 MG: 2.5 TABLET, FILM COATED, EXTENDED RELEASE ORAL at 13:16

## 2023-11-01 RX ADMIN — QUETIAPINE FUMARATE 12.5 MG: 25 TABLET ORAL at 19:02

## 2023-11-01 RX ADMIN — POTASSIUM CHLORIDE 40 MEQ: 1.5 SOLUTION ORAL at 16:11

## 2023-11-01 RX ADMIN — SODIUM CHLORIDE 500 ML: 0.9 INJECTION, SOLUTION INTRAVENOUS at 12:19

## 2023-11-01 RX ADMIN — BISACODYL 10 MG: 10 SUPPOSITORY RECTAL at 22:36

## 2023-11-01 RX ADMIN — IOHEXOL 100 ML: 350 INJECTION, SOLUTION INTRAVENOUS at 14:06

## 2023-11-01 RX ADMIN — MAGNESIUM CITRATE 296 ML: 1.75 LIQUID ORAL at 19:03

## 2023-11-01 RX ADMIN — SENNOSIDES AND DOCUSATE SODIUM 1 TABLET: 8.6; 5 TABLET ORAL at 16:13

## 2023-11-01 RX ADMIN — POLYETHYLENE GLYCOL 3350 17 G: 17 POWDER, FOR SOLUTION ORAL at 16:13

## 2023-11-01 RX ADMIN — PRAVASTATIN SODIUM 80 MG: 80 TABLET ORAL at 17:03

## 2023-11-01 RX ADMIN — LISINOPRIL 20 MG: 10 TABLET ORAL at 13:16

## 2023-11-01 RX ADMIN — ENOXAPARIN SODIUM 40 MG: 40 INJECTION SUBCUTANEOUS at 16:13

## 2023-11-01 NOTE — H&P
[id number] [facility]  H&P  Name: Willa Varela 82 y.o. female I MRN: [id number]  Unit/Bed#: ED-09 I Date of Admission: 11/1/2023   Date of Service: 11/1/2023 I Hospital Day: 0      Assessment/Plan   * Constipation  Assessment & Plan  POA: Altered mental status with worsening agitation and constipation, last BM 2.5 weeks ago. Patient asymptomatic. On physical exam abdomen is soft, nontender, nondistended. BS+ in all quadrant. Mild dullness to percussion in the LLQ. No palpable massess. CT Abdomen pelvis with contrast:  Impression: Nonspecific sigmoid colitis/proctitis, likely infectious or inflammatory. The rectum and sigmoid colon are fluid-filled and nondistended. Rectal fecal impaction is not present. Otherwise moderate colonic stool burden without bowel obstruction. Potential right renal mass. Follow-up with nonurgent MRI of the kidneys. Labs unremarkable. Plan:  Soap suds enema ordered. Aggressive bowel regimen ordered:   - Miralax 17g daily.    - Senokot S 8.6-50mg 1 tablet twice daily.    - PRN Dulcolax supossitory. Trend fever curves, monitor WBC. Altered mental status  Assessment & Plan  Per  patient has been more agitated for the past week which I suspect is likely in the setting of constipation. She presented to the ED on 10/23 with worsening agitation and "sundowning", with UA concerning for UTI and treated with Keflex. Due to ongoing agitation she was transitioned to Ciprofloxacin by her PCP. Seen by outpatient neurology on 10/31 and prescribed Seroquel 12.5mg twice daily as needed, however  has not yet picked up this medication. Plan:  Start Seroquel 12.5mg twice daily. Delirium precautions  Maintain sleep-wake cycle. Rest of management as above. Alzheimer's dementia with behavioral disturbance ([address])  Assessment & Plan  Previously on Memantine 10mg daily.    Follows up with [facility]  Prescribed Seroquel 12.5mg BID PRN for agitation on 10/31  Continue PTA Seroquel    Thrombocytopenia (HCC)  Assessment & Plan  Chronic thrombocytopenia, unknown cause. At this time will continue monitoring platelet count. Hypertension  Assessment & Plan  Continue PTA Lisinopril-HCTZ 20-25mg daily. Right renal mass  Assessment & Plan  Incidental finding of possible right renal mass on CT A/P, for which a nonemergent MRI is recommended. Discussed finding with patient's  and advised him to follow up with PCP. Will note incidental findings in chart. Hypokalemia  Assessment & Plan  Mild hypokalemia with K 3.4 present. Jacqulyne Regulus ordered. Monitor K and Mg in the AM.           VTE Pharmacologic Prophylaxis: VTE Score: 3 Moderate Risk (Score 3-4) - Pharmacological DVT Prophylaxis Ordered: enoxaparin (Lovenox). Code Status: Level 3 - DNAR and DNI   Discussion with family: Updated  () at bedside. Anticipated Length of Stay: Patient will be admitted on an observation basis with an anticipated length of stay of less than 2 midnights secondary to constipation. Chief Complaint: agitation    History of Present Illness:  Joey Hawthorne is a 82 y.o. female with a PMH of Dementia, HTN who presents with worsening of baseline dementia. Per  at bedside, patient has been increasingly more agitated in the past 2 weeks. She presented to the ED on 10/23 with agitation and sundowning, with UA at the time concerning for UTI, for which she was prescirbed Keflex. Due to ongoing agitation, she was transitioned to Ciprofloxacin by her PCP. She now presents due to not having a BM in 2.5 weeks. Patient appears mildly agitated when examined, alert to self only but easily redirectable. She denies any abdominal distension or tenderness, dysuria, abdominal distension.    In the ED, VS are unremarkable, with imaging demonstrating nonspecific sigmoid colitis/proctitis and possible right renal mass for which outpatient MRI is recommended. She will be admitted to the [facility] service for further management of constipation. Review of Systems:  Review of Systems   Constitutional:  Negative for activity change, appetite change, fatigue, fever and unexpected weight change. HENT:  Negative for congestion, postnasal drip, rhinorrhea, sinus pressure, sore throat and trouble swallowing. Eyes:  Negative for photophobia and visual disturbance. Respiratory:  Negative for cough, chest tightness, shortness of breath and wheezing. Cardiovascular:  Negative for chest pain, palpitations and leg swelling. Gastrointestinal:  Positive for constipation. Negative for abdominal distention, abdominal pain, nausea and vomiting. Endocrine: Negative for polydipsia and polyuria. Genitourinary:  Negative for difficulty urinating, dysuria, frequency and hematuria. Musculoskeletal:  Negative for arthralgias, back pain and myalgias. Skin:  Negative for color change, pallor and rash. Neurological:  Negative for dizziness, facial asymmetry, weakness, light-headedness, numbness and headaches. Psychiatric/Behavioral:  Positive for agitation and confusion. Negative for dysphoric mood. All other systems reviewed and are negative. Past Medical and Surgical History:   Past Medical History:   Diagnosis Date    Dementia ([address])     Hypertension        Past Surgical History:   Procedure Laterality Date    COLONOSCOPY      DILATION AND CURETTAGE OF UTERUS      [place] ANTERIOR COLPORRAPHY RPR CYSTOCELE W/CYSTO N/A 1/21/2020    Procedure: COLPORRHAPHY;  Surgeon: Terra Rubinstein, MD;  [facility];  Service: Gynecology    MN VAGINAL HYSTERECTOMY UTERUS 250 GM/< N/A 1/21/2020    Procedure: HYSTERECTOMY VAGINAL TOTAL (TVH) left salpingectomy;  Surgeon: Terra Rubinstein, MD;  [facility];  Service: Gynecology    WRIST FRACTURE SURGERY Right        Meds/Allergies:  Prior to Admission medications    Medication Sig Start Date End Date Taking?  Authorizing Provider   Ascorbic Acid (vitamin C) 100 MG tablet Take 100 mg by mouth daily    Historical Provider, MD   cholecalciferol (VITAMIN D3) 400 units tablet Take 400 Units by mouth daily  Patient not taking: Reported on 10/31/2023    Historical Provider, MD   ciprofloxacin (CIPRO) 500 mg tablet  10/30/23   Historical Provider, MD   Cyanocobalamin (CVS VITAMIN B12 PO) Take 1,200 mg by mouth in the morning    Historical Provider, MD   felodipine (PLENDIL) 10 MG 24 hr tablet Take 10 mg by mouth daily 5/31/19   Historical Provider, MD   lisinopril-hydrochlorothiazide (PRINZIDE,ZESTORETIC) 20-25 MG per tablet Take 1 tablet by mouth daily 5/31/19   Historical Provider, MD   memantine (NAMENDA) 10 mg tablet TAKE 1 TABLET BY MOUTH TWICE A DAY  Patient not taking: Reported on 11/1/2023 7/7/23   Radha Cadena,    Propylene Glycol 0.6 % SOLN Apply to eye as needed  Patient not taking: Reported on 6/28/2022    Historical Provider, MD   QUEtiapine (SEROquel) 25 mg tablet Start by taking 12.5mg at night, as need you may also take 12.5mg in the morning as needed. 10/31/23   [place],    simvastatin (ZOCOR) 40 mg tablet Take 40 mg by mouth daily 4/27/19   Historical Provider, MD     I have reviewed home medications with patient family member. Allergies: Allergies   Allergen Reactions    Sulfa Antibiotics      Doesn't remember reaction       Social History:  Marital Status: /Civil Union   Occupation: Retired  Patient Pre-hospital Living Situation: Home  Patient Pre-hospital Level of Mobility: unable to be assessed at time of evaluation  Patient Pre-hospital Diet Restrictions: None  Substance Use History:   Social History     Substance and Sexual Activity   Alcohol Use Not Currently     Social History     Tobacco Use   Smoking Status Never   Smokeless Tobacco Never     Social History     Substance and Sexual Activity   Drug Use Never       Family History:  History reviewed. No pertinent family history.     Physical Exam:     Vitals:   Blood Pressure: 143/65 (11/01/23 1330)  Pulse: 70 (11/01/23 1330)  Temperature: (!) 97.4 °F (36.3 °C) (11/01/23 1013)  Temp Source: Oral (11/01/23 1013)  Respirations: 16 (11/01/23 1330)  Weight - Scale: 56.2 kg (124 lb) (11/01/23 1013)  SpO2: 97 % (11/01/23 1330)    Physical Exam  Vitals and nursing note reviewed. Constitutional:       General: She is not in acute distress. Appearance: Normal appearance. She is not ill-appearing, toxic-appearing or diaphoretic. HENT:      Head: Normocephalic and atraumatic. Nose: Nose normal.      Mouth/Throat:      Mouth: Mucous membranes are moist.      Pharynx: Oropharynx is clear. Eyes:      General: No scleral icterus. Right eye: No discharge. Left eye: No discharge. Extraocular Movements: Extraocular movements intact. Conjunctiva/sclera: Conjunctivae normal.   Cardiovascular:      Rate and Rhythm: Normal rate and regular rhythm. Pulses: Normal pulses. Heart sounds: Normal heart sounds. No murmur heard. Pulmonary:      Effort: Pulmonary effort is normal. No respiratory distress. Breath sounds: Normal breath sounds. No wheezing, rhonchi or rales. Abdominal:      General: Abdomen is flat. Bowel sounds are normal. There is no distension. Palpations: Abdomen is soft. Tenderness: There is no abdominal tenderness. There is no guarding or rebound. Musculoskeletal:      Cervical back: Normal range of motion and neck supple. Right lower leg: No edema. Left lower leg: No edema. Skin:     General: Skin is warm and dry. Coloration: Skin is not jaundiced or pale. Neurological:      Mental Status: She is alert. She is disoriented. Psychiatric:         Behavior: Behavior is agitated. Cognition and Memory: Cognition is impaired.           Additional Data:     Lab Results:  Results from last 7 days   Lab Units 11/01/23  1218   WBC Thousand/uL 5.45   HEMOGLOBIN g/dL 11.7 HEMATOCRIT % 35.0   PLATELETS Thousands/uL 118*   NEUTROS PCT % 61   LYMPHS PCT % 29   MONOS PCT % 8   EOS PCT % 1     Results from last 7 days   Lab Units 11/01/23  1218   SODIUM mmol/L 141   POTASSIUM mmol/L 3.4*   CHLORIDE mmol/L 105   CO2 mmol/L 31   BUN mg/dL 29*   CREATININE mg/dL 0.82   ANION GAP mmol/L 5   CALCIUM mg/dL 8.9   ALBUMIN g/dL 4.0   TOTAL BILIRUBIN mg/dL 0.64   ALK PHOS U/L 45   ALT U/L 11   AST U/L 15   GLUCOSE RANDOM mg/dL 87                       Lines/Drains:  Invasive Devices       Peripheral Intravenous Line  Duration             Peripheral IV 11/01/23 Proximal;Right;Ventral (anterior) Forearm <1 day                        Imaging: Reviewed radiology reports from this admission including: abdominal/pelvic CT  CT Abdomen pelvis with contrast   Final Result by Cr Boyd MD (11/01 1422)      Nonspecific sigmoid colitis/proctitis, likely infectious or inflammatory. The rectum and sigmoid colon are fluid-filled and nondistended. Rectal fecal impaction is not present. Otherwise moderate colonic stool burden without bowel obstruction. Potential right renal mass. Follow-up with nonurgent MRI of the kidneys. The study was marked in [facility] for immediate notification. Workstation performed: [id number]             EKG and Other Studies Reviewed on Admission:   EKG: No EKG obtained. ** Please Note: This note has been constructed using a voice recognition system.  **

## 2023-11-01 NOTE — ASSESSMENT & PLAN NOTE
Mild hypokalemia with K 3.4 present. [facility] Party ordered.    Monitor K and Mg in the AM.

## 2023-11-01 NOTE — ED PROVIDER NOTES
History  Chief Complaint   Patient presents with    Constipation     No BM in 2.5 weeks, as per  urologist concerned that constipation is effecting dementia, pt denies abd pain, pt still urinating     Patient is an 82-year-old female with dementia that presents to the emergency department with some worsening of baseline dementia and constipation with inability to have a bowel movement for 2.5 weeks. Patient's  reports that about 2 weeks ago he noticed that she seemed more confused from her baseline and about 9 days ago was diagnosed with urinary tract infection for which she was treated with 7 days of antibiotics. They then followed up with her primary care physician because she still was more confused from baseline so he started a second antibiotic. When they discussed her constipation, PCP advised they come to the emergency department for evaluation of constipation as this may be contributing to her worsening confusion. Patient denies any abdominal pain. Patient is eating and drinking normally and has not had any vomiting.  does report that she has had a little less to drink over the last couple of days but otherwise drinking normally. He has tried administering glycerin suppository, which she has had to do in the past for her, but she did not pass any stool this morning. Patient is otherwise acting normally, has had no fevers at home, no chest pain, or shortness of breath. Takes no opioid medications. No sick contacts and patient is otherwise in her usual state of health. Prior to Admission Medications   Prescriptions Last Dose Informant Patient Reported? Taking?    Ascorbic Acid (vitamin C) 100 MG tablet   Yes No   Sig: Take 100 mg by mouth daily   Cyanocobalamin (CVS VITAMIN B12 PO)   Yes No   Sig: Take 1,200 mg by mouth in the morning   Propylene Glycol 0.6 % SOLN   Yes No   Sig: Apply to eye as needed   Patient not taking: Reported on 6/28/2022   QUEtiapine (SEROquel) 25 mg tablet   No No   Sig: Start by taking 12.5mg at night, as need you may also take 12.5mg in the morning as needed. cholecalciferol (VITAMIN D3) 400 units tablet   Yes No   Sig: Take 400 Units by mouth daily   Patient not taking: Reported on 10/31/2023   ciprofloxacin (CIPRO) 500 mg tablet   Yes No   felodipine (PLENDIL) 10 MG 24 hr tablet   Yes No   Sig: Take 10 mg by mouth daily   lisinopril-hydrochlorothiazide (PRINZIDE,ZESTORETIC) 20-25 MG per tablet   Yes No   Sig: Take 1 tablet by mouth daily   memantine (NAMENDA) 10 mg tablet   No No   Sig: TAKE 1 TABLET BY MOUTH TWICE A DAY   simvastatin (ZOCOR) 40 mg tablet   Yes No   Sig: Take 40 mg by mouth daily      Facility-Administered Medications: None       Past Medical History:   Diagnosis Date    Dementia ([address])     Hypertension        Past Surgical History:   Procedure Laterality Date    COLONOSCOPY      DILATION AND CURETTAGE OF UTERUS      [place] ANTERIOR COLPORRAPHY RPR CYSTOCELE W/CYSTO N/A 1/21/2020    Procedure: COLPORRHAPHY;  Surgeon: Terra Rubinstein, MD;  [facility]: [facility];  Service: Gynecology    [place] VAGINAL HYSTERECTOMY UTERUS 250 GM/< N/A 1/21/2020    Procedure: HYSTERECTOMY VAGINAL TOTAL (TVH) left salpingectomy;  Surgeon: Terra Rubinstein, MD;  Location: [facility];  Service: Gynecology    WRIST FRACTURE SURGERY Right        History reviewed. No pertinent family history. I have reviewed and agree with the history as documented. E-Cigarette/Vaping    E-Cigarette Use Never User      E-Cigarette/Vaping Substances     Social History     Tobacco Use    Smoking status: Never    Smokeless tobacco: Never   Vaping Use    Vaping Use: Never used   Substance Use Topics    Alcohol use: Not Currently    Drug use: Never        Review of Systems   Constitutional:  Negative for chills and fever. Respiratory:  Negative for cough and shortness of breath. Cardiovascular:  Negative for chest pain and palpitations. Gastrointestinal:  Positive for constipation.  Negative for abdominal pain, diarrhea, nausea and vomiting. Genitourinary:  Negative for dysuria and hematuria. Musculoskeletal:  Negative for arthralgias and back pain. Skin:  Negative for color change and rash. Neurological:  Negative for dizziness, seizures, syncope, light-headedness and headaches. Psychiatric/Behavioral:  Positive for confusion. All other systems reviewed and are negative. Physical Exam  ED Triage Vitals   Temperature Pulse Respirations Blood Pressure SpO2   11/01/23 1013 11/01/23 1013 11/01/23 1013 11/01/23 1013 11/01/23 1013   (!) 97.4 °F (36.3 °C) 73 16 166/72 96 %      Temp Source Heart Rate Source Patient Position - Orthostatic VS BP Location FiO2 (%)   11/01/23 1013 11/01/23 1013 11/01/23 1230 11/01/23 1013 --   Oral Monitor Lying Right arm       Pain Score       --                    Orthostatic Vital Signs  Vitals:    11/01/23 1013 11/01/23 1230 11/01/23 1316 11/01/23 1330   BP: 166/72 143/63 127/58 143/65   Pulse: 73 69  70   Patient Position - Orthostatic VS:  Lying  Lying       Physical Exam  Vitals and nursing note reviewed. Constitutional:       General: She is not in acute distress. Appearance: Normal appearance. She is well-developed. She is not ill-appearing. HENT:      Head: Normocephalic and atraumatic. Right Ear: External ear normal.      Left Ear: External ear normal.      Mouth/Throat:      Pharynx: Oropharynx is clear. No oropharyngeal exudate or posterior oropharyngeal erythema. Eyes:      General:         Right eye: No discharge. Left eye: No discharge. Extraocular Movements: Extraocular movements intact. Conjunctiva/sclera: Conjunctivae normal.   Cardiovascular:      Rate and Rhythm: Normal rate and regular rhythm. Pulses: Normal pulses. Heart sounds: Normal heart sounds. No murmur heard. Pulmonary:      Effort: Pulmonary effort is normal. No respiratory distress. Breath sounds: Normal breath sounds.  No wheezing, rhonchi or rales. Abdominal:      General: Abdomen is flat. Palpations: Abdomen is soft. Tenderness: There is no abdominal tenderness. There is no right CVA tenderness, left CVA tenderness, guarding or rebound. Genitourinary:     Rectum: Normal.      Comments: Exam.  No stool felt within the rectal vault  Musculoskeletal:         General: No swelling or deformity. Normal range of motion. Cervical back: Neck supple. No tenderness. Skin:     General: Skin is warm and dry. Capillary Refill: Capillary refill takes less than 2 seconds. Neurological:      Mental Status: She is alert. ED Medications  Medications   sodium chloride 0.9 % bolus 500 mL (0 mL Intravenous Stopped 11/1/23 1320)   felodipine (PLENDIL) 24 hr tablet 10 mg (10 mg Oral Given 11/1/23 1316)   lisinopril (ZESTRIL) tablet 20 mg (20 mg Oral Given 11/1/23 1316)   hydrochlorothiazide (HYDRODIURIL) tablet 25 mg (25 mg Oral Given 11/1/23 1316)   iohexol (OMNIPAQUE) 350 MG/ML injection (SINGLE-DOSE) 100 mL (100 mL Intravenous Given 11/1/23 1406)       Diagnostic Studies  Results Reviewed       Procedure Component Value Units Date/Time    UA w Reflex to Microscopic w Reflex to Culture [[id number]] Collected: 11/01/23 1447    Lab Status:  In process Specimen: Urine, Clean Catch Updated: 11/01/23 1449    Lipase [[id number]]  (Normal) Collected: 11/01/23 1218    Lab Status: Final result Specimen: Blood from Arm, Right Updated: 11/01/23 1322     Lipase 25 u/L     CMP [[id number]]  (Abnormal) Collected: 11/01/23 1218    Lab Status: Final result Specimen: Blood from Arm, Right Updated: 11/01/23 1322     Sodium 141 mmol/L      Potassium 3.4 mmol/L      Chloride 105 mmol/L      CO2 31 mmol/L      ANION GAP 5 mmol/L      BUN 29 mg/dL      Creatinine 0.82 mg/dL      Glucose 87 mg/dL      Calcium 8.9 mg/dL      AST 15 U/L      ALT 11 U/L      Alkaline Phosphatase 45 U/L      Total Protein 5.7 g/dL      Albumin 4.0 g/dL      Total Bilirubin 0.64 mg/dL      eGFR 67 ml/min/1.73sq m     Narrative:      [place] guidelines for Chronic Kidney Disease (CKD):     Stage 1 with normal or high GFR (GFR > 90 mL/min/1.73 square meters)    Stage 2 Mild CKD (GFR = 60-89 mL/min/1.73 square meters)    Stage 3A Moderate CKD (GFR = 45-59 mL/min/1.73 square meters)    Stage 3B Moderate CKD (GFR = 30-44 mL/min/1.73 square meters)    Stage 4 Severe CKD (GFR = 15-29 mL/min/1.73 square meters)    Stage 5 End Stage CKD (GFR <15 mL/min/1.73 square meters)  Note: GFR calculation is accurate only with a steady state creatinine    CBC and differential [[id number]]  (Abnormal) Collected: 11/01/23 1218    Lab Status: Final result Specimen: Blood from Arm, Right Updated: 11/01/23 1313     WBC 5.45 Thousand/uL      RBC 3.78 Million/uL      Hemoglobin 11.7 g/dL      Hematocrit 35.0 %      MCV 93 fL      MCH 31.0 pg      MCHC 33.4 g/dL      RDW 12.8 %      MPV 11.0 fL      Platelets 233 Thousands/uL      nRBC 0 /100 WBCs      Neutrophils Relative 61 %      Immat GRANS % 1 %      Lymphocytes Relative 29 %      Monocytes Relative 8 %      Eosinophils Relative 1 %      Basophils Relative 0 %      Neutrophils Absolute 3.34 Thousands/µL      Immature Grans Absolute 0.03 Thousand/uL      Lymphocytes Absolute 1.59 Thousands/µL      Monocytes Absolute 0.45 Thousand/µL      Eosinophils Absolute 0.03 Thousand/µL      Basophils Absolute 0.01 Thousands/µL                    CT Abdomen pelvis with contrast   Final Result by Joann Castillo MD (11/01 1422)      Nonspecific sigmoid colitis/proctitis, likely infectious or inflammatory. The rectum and sigmoid colon are fluid-filled and nondistended. Rectal fecal impaction is not present. Otherwise moderate colonic stool burden without bowel obstruction. Potential right renal mass. Follow-up with nonurgent MRI of the kidneys. The study was marked in [facility] for immediate notification. Workstation performed: [id number]               Procedures  Procedures      ED Course                                       Medical Decision Making  66N with dementia presents to the emergency department with her  due to some worsening from baseline dementia and patient has not had a bowel movement in 2.5 weeks. On exam, patient has soft abdomen that is nontender. She has no CVA tenderness. Patient's exam overall well appearing. Laboratory evaluation essentially within normal limits. CT scan of the abdomen and pelvis with contrast reveals nonspecific sigmoid colitis/proctitis which is likely inflammatory given patient's constipation. Low suspicion for infectious cause as patient is afebrile and has essentially normal labs. Additionally, there is a finding in the right kidney which is suspicious for mass which will require follow-up in the future. Patient would benefit from bowel regimen/cleanout. Discussed with family risks and benefits of discharge and bowel cleanout at home. Patient's  is uncomfortable taking the patient home for bowel cleanout I would prefer the patient be admitted. Patient's case is discussed with [facility] internal medicine for further evaluation and treatment, and bowel cleanout, and patient is admitted under the care of . [facility] internal medicine. Amount and/or Complexity of Data Reviewed  Labs: ordered. Radiology: ordered. Risk  Prescription drug management. Decision regarding hospitalization.           Disposition  Final diagnoses:   Acute proctitis   Constipation   Right renal mass     Time reflects when diagnosis was documented in both MDM as applicable and the Disposition within this note       Time User Action Codes Description Comment    11/1/2023  2:48 PM Valri  Add [K62.89] Acute proctitis     11/1/2023  2:48 PM Valri  Add [K59.00] Constipation     11/1/2023  2:48 PM Valri  Add [N28.89] Right renal mass           ED Disposition ED Disposition   Admit    Condition   Stable    Date/Time   Wed Nov 1, 2023  2:50 PM    Comment   Case was discussed with RITA and the patient's admission status was agreed to be Admission Status: observation status to the service of Dr. Max Kothari. Follow-up Information    None         Patient's Medications   Discharge Prescriptions    No medications on file     No discharge procedures on file. PDMP Review       None             ED Provider  Attending physically available and evaluated Jackson Lott. I managed the patient along with the ED Attending.     Electronically Signed by           Marietta Castillo DO  11/01/23 7427

## 2023-11-01 NOTE — ASSESSMENT & PLAN NOTE
Per  patient has been more agitated for the past week which I suspect is likely in the setting of constipation. She presented to the ED on 10/23 with worsening agitation and "sundowning", with UA concerning for UTI and treated with Keflex. Due to ongoing agitation she was transitioned to Ciprofloxacin by her PCP. Seen by outpatient neurology on 10/31 and prescribed Seroquel 12.5mg twice daily as needed, however  has not yet picked up this medication. Plan:  Start Seroquel 12.5mg twice daily. Delirium precautions  Maintain sleep-wake cycle. Rest of management as above.

## 2023-11-01 NOTE — ED NOTES
Patient's  provided with a menu and patient's  reached out to food services to order dinner for the patient     Naomie Colón RN  11/01/23 9468

## 2023-11-01 NOTE — ASSESSMENT & PLAN NOTE
Incidental finding of possible right renal mass on CT A/P, for which a nonemergent MRI is recommended. Discussed finding with patient's  and advised him to follow up with PCP. Will note incidental findings in chart.

## 2023-11-01 NOTE — INCIDENTAL FINDINGS
The following findings require follow up:  Radiographic finding   Finding: Contour bulging of the right kidney with underlying cortical hypoenhancement #301/68. Follow-up with MRI to assess for potential right renal mass.     Follow up required: Yes   Follow up should be done within 1 month(s)    Please notify the following clinician to assist with the follow up:   Dr. Negro Joseph

## 2023-11-01 NOTE — ED ATTENDING ATTESTATION
11/1/2023  Army Ailin ESPINOSA MD, saw and evaluated the patient. I have discussed the patient with the resident/non-physician practitioner and agree with the resident's/non-physician practitioner's findings, Plan of Care, and MDM as documented in the resident's/non-physician practitioner's note, except where noted. All available labs and Radiology studies were reviewed. I was present for key portions of any procedure(s) performed by the resident/non-physician practitioner and I was immediately available to provide assistance. At this point I agree with the current assessment done in the Emergency Department. I have conducted an independent evaluation of this patient a history and physical is as follows:    82-year-old female with history of Alzheimer's dementia presenting for evaluation of constipation. Patient is accompanied by her  who provides the history. States that the patient has not had a bowel movement in 2-1/2 weeks. She has been constipated in the past but he has been able to relieve it with over-the-counter stool softeners and suppositories. States that the patient is still eating and drinking normally and has not complained of any abdominal pain. Urinating without difficulty. The patient's  also reports that she has had decline in cognition with increased hallucinations and confusion over the last 4 weeks. She was evaluated by neurology yesterday and taken off of amantadine and started on Seroquel for help with sundowning. Patient's  is concerned that she may have a persistent urinary tract infection. She was evaluated here in the emergency department on October 23 and had a UA showing moderate bacteria and large leukocytes. She was treated empirically with a course of Keflex due to concern that a UTI may be leading to worsening in her cognition.   Her cognition was not improved after completing this antibiotic, so her primary care physician started her on Cipro yesterday. Repeat urinalysis was not performed. Physical Exam  Vitals and nursing note reviewed. Constitutional:       General: She is not in acute distress. Appearance: She is well-developed. She is not diaphoretic. HENT:      Head: Normocephalic and atraumatic. Right Ear: External ear normal.      Left Ear: External ear normal.      Nose: Nose normal.   Eyes:      Conjunctiva/sclera: Conjunctivae normal.   Cardiovascular:      Rate and Rhythm: Normal rate and regular rhythm. Heart sounds: Normal heart sounds. No murmur heard. No friction rub. No gallop. Pulmonary:      Effort: Pulmonary effort is normal. No respiratory distress. Breath sounds: Normal breath sounds. No wheezing or rales. Abdominal:      General: Bowel sounds are normal. There is no distension. Palpations: Abdomen is soft. Tenderness: There is no abdominal tenderness (abdomen benign to deep palpation). There is no right CVA tenderness, left CVA tenderness or guarding. Musculoskeletal:         General: No deformity. Normal range of motion. Cervical back: Normal range of motion and neck supple. Skin:     General: Skin is warm and dry. Neurological:      General: No focal deficit present. Mental Status: She is alert. Motor: No abnormal muscle tone. Psychiatric:         Mood and Affect: Mood normal.         ED Course  ED Course as of 11/01/23 1902   Wed Nov 01, 2023   1548 CT scan of the abdomen pelvis shows nonspecific sigmoid colitis/proctitis. Doubt infectious colitis in the absence of fever, leukocytosis, or systemic symptoms. Patient's rectum and sigmoid colon are fluid-filled and nondistended without fecal impaction. Options for bowel cleanout discussed with the patient's  at bedside who prefers that she be admitted for bowel cleanout. Patient excepted for admission by [facility].   Incidental finding of potential right renal mass with recommendation for nonemergent MRI of the kidneys discussed with the patient's  at bedside.          Critical Care Time  Procedures

## 2023-11-01 NOTE — ASSESSMENT & PLAN NOTE
POA: Altered mental status with worsening agitation and constipation, last BM 2.5 weeks ago. Patient asymptomatic. On physical exam abdomen is soft, nontender, nondistended. BS+ in all quadrant. Mild dullness to percussion in the LLQ. No palpable massess. CT Abdomen pelvis with contrast:  Impression: Nonspecific sigmoid colitis/proctitis, likely infectious or inflammatory. The rectum and sigmoid colon are fluid-filled and nondistended. Rectal fecal impaction is not present. Otherwise moderate colonic stool burden without bowel obstruction. Potential right renal mass. Follow-up with nonurgent MRI of the kidneys. Labs unremarkable. Plan:  Soap suds enema ordered. Aggressive bowel regimen ordered:   - Miralax 17g daily.    - Senokot S 8.6-50mg 1 tablet twice daily.    - PRN Dulcolax supossitory. Trend fever curves, monitor WBC.

## 2023-11-01 NOTE — ASSESSMENT & PLAN NOTE
Previously on Memantine 10mg daily.    Follows up with [facility] neurology  Prescribed Seroquel 12.5mg BID PRN for agitation on 10/31  Continue PTA Seroquel

## 2023-11-02 VITALS
RESPIRATION RATE: 15 BRPM | HEART RATE: 76 BPM | TEMPERATURE: 98.4 F | DIASTOLIC BLOOD PRESSURE: 72 MMHG | SYSTOLIC BLOOD PRESSURE: 122 MMHG | BODY MASS INDEX: 20.63 KG/M2 | OXYGEN SATURATION: 98 % | WEIGHT: 124 LBS

## 2023-11-02 PROBLEM — K59.00 CONSTIPATION: Status: RESOLVED | Noted: 2023-11-01 | Resolved: 2023-11-02

## 2023-11-02 PROBLEM — R41.82 ALTERED MENTAL STATUS: Status: RESOLVED | Noted: 2023-11-01 | Resolved: 2023-11-02

## 2023-11-02 LAB
ANION GAP SERPL CALCULATED.3IONS-SCNC: 7 MMOL/L
BASOPHILS # BLD AUTO: 0.03 THOUSANDS/ÂΜL (ref 0–0.1)
BASOPHILS NFR BLD AUTO: 0 % (ref 0–1)
BUN SERPL-MCNC: 25 MG/DL (ref 5–25)
CALCIUM SERPL-MCNC: 9.1 MG/DL (ref 8.4–10.2)
CHLORIDE SERPL-SCNC: 104 MMOL/L (ref 96–108)
CO2 SERPL-SCNC: 27 MMOL/L (ref 21–32)
CREAT SERPL-MCNC: 0.81 MG/DL (ref 0.6–1.3)
EOSINOPHIL # BLD AUTO: 0.04 THOUSAND/ÂΜL (ref 0–0.61)
EOSINOPHIL NFR BLD AUTO: 0 % (ref 0–6)
ERYTHROCYTE [DISTWIDTH] IN BLOOD BY AUTOMATED COUNT: 12.7 % (ref 11.6–15.1)
GFR SERPL CREATININE-BSD FRML MDRD: 68 ML/MIN/1.73SQ M
GLUCOSE P FAST SERPL-MCNC: 107 MG/DL (ref 65–99)
GLUCOSE SERPL-MCNC: 107 MG/DL (ref 65–140)
HCT VFR BLD AUTO: 37.1 % (ref 34.8–46.1)
HGB BLD-MCNC: 12.6 G/DL (ref 11.5–15.4)
IMM GRANULOCYTES # BLD AUTO: 0.04 THOUSAND/UL (ref 0–0.2)
IMM GRANULOCYTES NFR BLD AUTO: 0 % (ref 0–2)
LYMPHOCYTES # BLD AUTO: 1.32 THOUSANDS/ÂΜL (ref 0.6–4.47)
LYMPHOCYTES NFR BLD AUTO: 13 % (ref 14–44)
MAGNESIUM SERPL-MCNC: 2.1 MG/DL (ref 1.9–2.7)
MCH RBC QN AUTO: 31.3 PG (ref 26.8–34.3)
MCHC RBC AUTO-ENTMCNC: 34 G/DL (ref 31.4–37.4)
MCV RBC AUTO: 92 FL (ref 82–98)
MONOCYTES # BLD AUTO: 0.79 THOUSAND/ÂΜL (ref 0.17–1.22)
MONOCYTES NFR BLD AUTO: 8 % (ref 4–12)
NEUTROPHILS # BLD AUTO: 8.07 THOUSANDS/ÂΜL (ref 1.85–7.62)
NEUTS SEG NFR BLD AUTO: 79 % (ref 43–75)
NRBC BLD AUTO-RTO: 0 /100 WBCS
PHOSPHATE SERPL-MCNC: 2.1 MG/DL (ref 2.3–4.1)
PLATELET # BLD AUTO: 136 THOUSANDS/UL (ref 149–390)
PLATELET BLD QL SMEAR: ABNORMAL
PMV BLD AUTO: 10.8 FL (ref 8.9–12.7)
POTASSIUM SERPL-SCNC: 4.2 MMOL/L (ref 3.5–5.3)
RBC # BLD AUTO: 4.02 MILLION/UL (ref 3.81–5.12)
RBC MORPH BLD: NORMAL
SODIUM SERPL-SCNC: 138 MMOL/L (ref 135–147)
WBC # BLD AUTO: 10.29 THOUSAND/UL (ref 4.31–10.16)

## 2023-11-02 PROCEDURE — 99239 HOSP IP/OBS DSCHRG MGMT >30: CPT | Performed by: HOSPITALIST

## 2023-11-02 PROCEDURE — 80048 BASIC METABOLIC PNL TOTAL CA: CPT

## 2023-11-02 PROCEDURE — 85025 COMPLETE CBC W/AUTO DIFF WBC: CPT

## 2023-11-02 PROCEDURE — 83735 ASSAY OF MAGNESIUM: CPT

## 2023-11-02 PROCEDURE — 84100 ASSAY OF PHOSPHORUS: CPT

## 2023-11-02 RX ORDER — AMOXICILLIN 250 MG
1 CAPSULE ORAL 2 TIMES DAILY
Qty: 30 TABLET | Refills: 0 | Status: SHIPPED | OUTPATIENT
Start: 2023-11-02 | End: 2023-11-17

## 2023-11-02 RX ORDER — QUETIAPINE FUMARATE 25 MG/1
12.5 TABLET, FILM COATED ORAL
Status: DISCONTINUED | OUTPATIENT
Start: 2023-11-03 | End: 2023-11-02 | Stop reason: HOSPADM

## 2023-11-02 RX ORDER — BISACODYL 10 MG
10 SUPPOSITORY, RECTAL RECTAL DAILY PRN
Qty: 12 SUPPOSITORY | Refills: 0 | Status: SHIPPED | OUTPATIENT
Start: 2023-11-02

## 2023-11-02 RX ADMIN — LISINOPRIL 20 MG: 20 TABLET ORAL at 09:52

## 2023-11-02 RX ADMIN — SENNOSIDES AND DOCUSATE SODIUM 1 TABLET: 8.6; 5 TABLET ORAL at 09:52

## 2023-11-02 RX ADMIN — HYDROCHLOROTHIAZIDE 25 MG: 25 TABLET ORAL at 09:52

## 2023-11-02 RX ADMIN — POLYETHYLENE GLYCOL 3350 17 G: 17 POWDER, FOR SOLUTION ORAL at 09:51

## 2023-11-02 RX ADMIN — QUETIAPINE FUMARATE 12.5 MG: 25 TABLET ORAL at 09:52

## 2023-11-02 RX ADMIN — ENOXAPARIN SODIUM 40 MG: 40 INJECTION SUBCUTANEOUS at 09:51

## 2023-11-02 NOTE — TELEPHONE ENCOUNTER
Patient's spouse, Guero Palomares called this writer. He states his wife was admitted to the hospital last night. Guero Palomares said he is concerned because the Seroquel prescribed by the doctor needs prior authorization. Spouse was unable to pick it up at pharmacy due to this. Clinical team- Can we please obtain prior authorization for Seroquel? Spouse requesting phone call on his cell at [phone] once this is complete and he is able to  the prescription. Thank you in advance. Spouse states someone from [facility] came out to the home and is able to provide aide services for 2-3x/week for 2-3 hours at a time. Spouse is not interested in placement for his wife, patient. Did discuss short term rehab and inpatient therapy evaluations for such. Made spouse aware that if patient is not at her baseline prior to admission, this may be beneficial for her. Spouse expressed understanding. SW remains available.

## 2023-11-02 NOTE — ASSESSMENT & PLAN NOTE
Incidental finding of possible right renal mass on CT A/P, for which a nonemergent MRI is recommended. Discussed finding with patient's  and advised him to follow up with PCP. Will note incidental findings in chart.    Results from last 7 days   Lab Units 11/01/23  1447   LEUKOCYTES UA  Negative   NITRITE UA  Negative   GLUCOSE UA mg/dl Negative   KETONES UA mg/dl Trace*   BLOOD UA  Negative   WBC UA /hpf 1-2   RBC UA /hpf 1-2   BACTERIA UA /hpf None Seen

## 2023-11-02 NOTE — ASSESSMENT & PLAN NOTE
POA: Altered mental status with worsening agitation and constipation, last BM 2.5 weeks ago. Patient asymptomatic. On physical exam abdomen is soft, nontender, nondistended. BS+ in all quadrant. Mild dullness to percussion in the LLQ. No palpable massess. CT Abdomen pelvis with contrast:  Impression: Nonspecific sigmoid colitis/proctitis, likely infectious or inflammatory. The rectum and sigmoid colon are fluid-filled and nondistended. Rectal fecal impaction is not present. Otherwise moderate colonic stool burden without bowel obstruction. Potential right renal mass. Follow-up with nonurgent MRI of the kidneys. Labs unremarkable. Plan:  Pt had watery bowel movements since initiation of laxatives. Will continue senna and docusate at home. - Senokot S 8.6-50mg 1 tablet twice daily.    - PRN Dulcolax supossitory.

## 2023-11-02 NOTE — DISCHARGE INSTR - AVS FIRST PAGE
Dear Lizz Alonzo,     It was our pleasure to care for you here at [facility]. It is our hope that we were always able to exceed the expected standards for your care during your stay. You were hospitalized due to constipation. You were cared for on the  4 floor by Deng Mccoy MD under the service of Marito Muhammad MD with the [facility] who covers for your primary care physician (PCP), Wendy Novak MD, while you were hospitalized. If you have any questions or concerns related to this hospitalization, you may contact us at [phone]. For follow up as well as any medication refills, we recommend that you follow up with your primary care physician. A registered nurse will reach out to you by phone within a few days after your discharge to answer any additional questions that you may have after going home. However, at this time we provide for you here, the most important instructions / recommendations at discharge:     Notable Medication Adjustments -    Please continue to take Senna and Bisacodyl as needed for constipation. Please stop taking ciprofloxacin as your urine analysis during this hospitalization doesn't have any bacteria. Testing Required after Discharge -   none  ** Please contact your PCP to request testing orders for any of the testing recommended here **  Important follow up information -   Please f/u with pcp in one  week. An incidental finding right sided renal mass is noted on CT abdomen.  Please f/u with Nephrology in one week of discharge  Other Instructions -   If you experience severe constipation and not passing gas or observed any bloody bowel movement please return to the hospital .   Please review this entire after visit summary as additional general instructions including medication list, appointments, activity, diet, any pertinent wound care, and other additional recommendations from your care team that may be provided for you.       Sincerely,     Bulmaro Winston MD

## 2023-11-02 NOTE — PLAN OF CARE
Problem: Potential for Falls  Goal: Patient will remain free of falls  Description: INTERVENTIONS:  - Educate patient/family on patient safety including physical limitations  - Instruct patient to call for assistance with activity   - Consult OT/PT to assist with strengthening/mobility   - Keep Call bell within reach  - Keep bed low and locked with side rails adjusted as appropriate  - Keep care items and personal belongings within reach  - Initiate and maintain comfort rounds  - Make Fall Risk Sign visible to staff  - Offer Toileting every  Hours, in advance of need  - Initiate/Maintain alarm  - Obtain necessary fall risk management equipment:   - Apply yellow socks and bracelet for high fall risk patients  - Consider moving patient to room near nurses station  Outcome: Progressing     Problem: PAIN - ADULT  Goal: Verbalizes/displays adequate comfort level or baseline comfort level  Description: Interventions:  - Encourage patient to monitor pain and request assistance  - Assess pain using appropriate pain scale  - Administer analgesics based on type and severity of pain and evaluate response  - Implement non-pharmacological measures as appropriate and evaluate response  - Consider cultural and social influences on pain and pain management  - Notify physician/advanced practitioner if interventions unsuccessful or patient reports new pain  Outcome: Progressing     Problem: INFECTION - ADULT  Goal: Absence or prevention of progression during hospitalization  Description: INTERVENTIONS:  - Assess and monitor for signs and symptoms of infection  - Monitor lab/diagnostic results  - Monitor all insertion sites, i.e. indwelling lines, tubes, and drains  - Monitor endotracheal if appropriate and nasal secretions for changes in amount and color  - [place] appropriate cooling/warming therapies per order  - Administer medications as ordered  - Instruct and encourage patient and family to use good hand hygiene technique  - Identify and instruct in appropriate isolation precautions for identified infection/condition  Outcome: Progressing     Problem: GASTROINTESTINAL - ADULT  Goal: Minimal or absence of nausea and/or vomiting  Description: INTERVENTIONS:  - Administer IV fluids if ordered to ensure adequate hydration  - Maintain NPO status until nausea and vomiting are resolved  - Nasogastric tube if ordered  - Administer ordered antiemetic medications as needed  - Provide nonpharmacologic comfort measures as appropriate  - Advance diet as tolerated, if ordered  - Consider nutrition services referral to assist patient with adequate nutrition and appropriate food choices  Outcome: Progressing  Goal: Maintains or returns to baseline bowel function  Description: INTERVENTIONS:  - Assess bowel function  - Encourage oral fluids to ensure adequate hydration  - Administer IV fluids if ordered to ensure adequate hydration  - Administer ordered medications as needed  - Encourage mobilization and activity  - Consider nutritional services referral to assist patient with adequate nutrition and appropriate food choices  Outcome: Progressing     Problem: GENITOURINARY - ADULT  Goal: Absence of urinary retention  Description: INTERVENTIONS:  - Assess patient’s ability to void and empty bladder  - Monitor I/O  - Bladder scan as needed  - Discuss with physician/AP medications to alleviate retention as needed  - Discuss catheterization for long term situations as appropriate  Outcome: Progressing

## 2023-11-02 NOTE — UTILIZATION REVIEW
Initial Clinical Review    Admission: Date/Time/Statement:   Admission Orders (From admission, onward)       Ordered        11/01/23 1451  Place in Observation  Once                          Orders Placed This Encounter   Procedures    Place in Observation     Standing Status:   Standing     Number of Occurrences:   1     Order Specific Question:   Level of Care     Answer:   Med Surg [16]     ED Arrival Information       Expected   -    Arrival   11/1/2023 10:10    Acuity   Urgent              Means of arrival   Walk-In    Escorted by   Spouse    Service   Hospitalist    Admission type   Emergency              Arrival complaint   Constipated             Chief Complaint   Patient presents with    Constipation     No BM in 2.5 weeks, as per  urologist concerned that constipation is effecting dementia, pt denies abd pain, pt still urinating       Initial Presentation: 82 y.o. female PMH of Dementia, HTN , chronic thrombocytopenia, who presents to ED from home with  worsening of baseline dementia, no bm x 2.5 weeks Per  pt has been increasingly more agitated in the past 2 weeks. She seen in ED on 10/23 with agitation and sundowning, with UA at the time concerning for UTI, for which she was prescirbed Keflex. Due to ongoing agitation,  was transitioned to Ciprofloxacin by her PCP. On exam, pt mildly agitated, alert, oriented to self only . Abdomen soft, non tender, normal bowel sounds . Mild dullness to percussion in the LLQ  . Labs -low K , plt 118 . CT A/ P shows nonspecific sigmoid colitis/proctitis and possible right renal mass . Pt admitted as OBS with constipation, altered mental status . Plan - SSE , aggressive bowel regimen . Trend CBC. K and mag in am . Start Seroquel BID. Delirium monitoring.        Date: 11/2     ED Triage Vitals   Temperature Pulse Respirations Blood Pressure SpO2   11/01/23 1013 11/01/23 1013 11/01/23 1013 11/01/23 1013 11/01/23 1013   (!) 97.4 °F (36.3 °C) 73 16 166/72 96 % Temp Source Heart Rate Source Patient Position - Orthostatic VS BP Location FiO2 (%)   11/01/23 1013 11/01/23 1013 11/01/23 1230 11/01/23 1013 --   Oral Monitor Lying Right arm       Pain Score       11/01/23 2107       No Pain          Wt Readings from Last 1 Encounters:   11/01/23 56.2 kg (124 lb)     Additional Vital Signs:   te/Time Temp Pulse Resp BP MAP (mmHg) SpO2   11/01/23 21:07:47 98 °F (36.7 °C) 86 17 125/71 89 94 %   Comment rows:   OBSERV: patient alert and awake from ED at 11/01/23 2107   11/01/23 1921 -- 94 16 138/63 90 95 %   11/01/23 1330 -- 70 16 143/65 93 97 %   11/01/23 1316 -- -- -- 127/58 -- --   11/01/23 1230 -- 69 16 143/63 90 98 %     Pertinent Labs/Diagnostic Test Results:   CT Abdomen pelvis with contrast   Final Result by Venu Bolivar MD (11/01 1422)      Nonspecific sigmoid colitis/proctitis, likely infectious or inflammatory. The rectum and sigmoid colon are fluid-filled and nondistended. Rectal fecal impaction is not present. Otherwise moderate colonic stool burden without bowel obstruction. Potential right renal mass. Follow-up with nonurgent MRI of the kidneys. The study was marked in [facility] for immediate notification.             Workstation performed: [id number]               Results from last 7 days   Lab Units 11/01/23  1218   WBC Thousand/uL 5.45   HEMOGLOBIN g/dL 11.7   HEMATOCRIT % 35.0   PLATELETS Thousands/uL 118*   NEUTROS ABS Thousands/µL 3.34         Results from last 7 days   Lab Units 11/02/23  0635 11/01/23  1218   SODIUM mmol/L 138 141   POTASSIUM mmol/L 4.2 3.4*   CHLORIDE mmol/L 104 105   CO2 mmol/L 27 31   ANION GAP mmol/L 7 5   BUN mg/dL 25 29*   CREATININE mg/dL 0.81 0.82   EGFR ml/min/1.73sq m 68 67   CALCIUM mg/dL 9.1 8.9   MAGNESIUM mg/dL 2.1  --    PHOSPHORUS mg/dL 2.1*  --      Results from last 7 days   Lab Units 11/01/23  1218   AST U/L 15   ALT U/L 11   ALK PHOS U/L 45   TOTAL PROTEIN g/dL 5.7*   ALBUMIN g/dL 4.0   TOTAL BILIRUBIN mg/dL 0.64         Results from last 7 days   Lab Units 11/02/23  0635 11/01/23  1218   GLUCOSE RANDOM mg/dL 107 87             No results found for: "BETA-HYDROXYBUTYRATE"                                                                       Results from last 7 days   Lab Units 11/01/23  1218   LIPASE u/L 25                 Results from last 7 days   Lab Units 11/01/23  1447   CLARITY UA  Clear   COLOR UA  Light Yellow   SPEC GRAV UA  1.028   PH UA  6.0   GLUCOSE UA mg/dl Negative   KETONES UA mg/dl Trace*   BLOOD UA  Negative   PROTEIN UA mg/dl Trace*   NITRITE UA  Negative   BILIRUBIN UA  Negative   UROBILINOGEN UA (BE) mg/dl <2.0   LEUKOCYTES UA  Negative   WBC UA /hpf 1-2   RBC UA /hpf 1-2   BACTERIA UA /hpf None Seen   EPITHELIAL CELLS WET PREP /hpf Occasional                                                   ED Treatment:   Medication Administration from 11/01/2023 1010 to 11/01/2023 2102         Date/Time Order Dose Route Action     11/01/2023 1320 EDT sodium chloride 0.9 % bolus 500 mL 0 mL Intravenous Stopped     11/01/2023 1219 EDT sodium chloride 0.9 % bolus 500 mL 500 mL Intravenous New Bag     11/01/2023 1316 EDT felodipine (PLENDIL) 24 hr tablet 10 mg 10 mg Oral Given     11/01/2023 1316 EDT lisinopril (ZESTRIL) tablet 20 mg 20 mg Oral Given     11/01/2023 1316 EDT hydrochlorothiazide (HYDRODIURIL) tablet 25 mg 25 mg Oral Given     11/01/2023 1406 EDT iohexol (OMNIPAQUE) 350 MG/ML injection (SINGLE-DOSE) 100 mL 100 mL Intravenous Given     11/01/2023 1902 EDT QUEtiapine (SEROquel) tablet 12.5 mg 12.5 mg Oral Given     11/01/2023 1703 EDT pravastatin (PRAVACHOL) tablet 80 mg 80 mg Oral Given     11/01/2023 1613 EDT enoxaparin (LOVENOX) subcutaneous injection 40 mg 40 mg Subcutaneous Given     11/01/2023 1607 EDT potassium chloride (K-DUR,KLOR-CON) CR tablet 40 mEq -- Oral Canceled Entry     11/01/2023 1613 EDT polyethylene glycol (MIRALAX) packet 17 g 17 g Oral Given     11/01/2023 1613 EDT senna-docusate sodium (SENOKOT S) 8.6-50 mg per tablet 1 tablet 1 tablet Oral Given     11/01/2023 1611 EDT potassium chloride oral solution 40 mEq 40 mEq Oral Given     11/01/2023 1903 EDT magnesium citrate (CITROMA) oral solution 296 mL 296 mL Oral Given          Past Medical History:   Diagnosis Date    Dementia ([address])     Hypertension      Present on Admission:   Alzheimer's dementia with behavioral disturbance ([address] )      Admitting Diagnosis: Constipation [K59.00]  Constipated [K59.00]  Right renal mass [N28.89]  Acute proctitis [K62.89]  Age/Sex: 82 y.o. female  Admission Orders:  Scheduled Medications:  enoxaparin, 40 mg, Subcutaneous, Daily  lisinopril, 20 mg, Oral, Daily   And  hydrochlorothiazide, 25 mg, Oral, Daily  polyethylene glycol, 17 g, Oral, Daily  pravastatin, 80 mg, Oral, Daily With Dinner  QUEtiapine, 12.5 mg, Oral, BID  senna-docusate sodium, 1 tablet, Oral, BID      Continuous IV Infusions:     PRN Meds:  acetaminophen, 650 mg, Oral, Q6H PRN  bisacodyl, 10 mg, Rectal, Daily PRN x1 11/1      Reg diet   SCD   OOB as betina   SSE x1 11/2      Network Utilization Review Department  ATTENTION: Please call with any questions or concerns to [phone] and carefully listen to the prompts so that you are directed to the right person. All voicemails are confidential.   For Discharge needs, contact Care Management DC Support Team at [phone] opt. 2  Send all requests for admission clinical reviews, approved or denied determinations and any other requests to dedicated fax number below belonging to the campus where the patient is receiving treatment.  List of dedicated fax numbers for the Facilities:  [place] DENIALS (Administrative/Medical Necessity) [phone]   DISCHARGE SUPPORT TEAM (NETWORK) [id number] [employer] (Maternity/NICU/Pediatrics) [address] [place] [address] [address] [address] [facility] [address] [phone]

## 2023-11-02 NOTE — DISCHARGE SUMMARY
[address] [facility]  Discharge- Tacey Serge 1940, 82 y.o. female MRN: [id number]  Unit/Bed#: W -Sherry Encounter: [id number]  Primary Care Provider: Albert Chase MD   Date and time admitted to hospital: 11/1/2023 10:57 AM    * Constipation-resolved as of 11/2/2023  Assessment & Plan  POA: Altered mental status with worsening agitation and constipation, last BM 2.5 weeks ago. Patient asymptomatic. On physical exam abdomen is soft, nontender, nondistended. BS+ in all quadrant. Mild dullness to percussion in the LLQ. No palpable massess. CT Abdomen pelvis with contrast:  Impression: Nonspecific sigmoid colitis/proctitis, likely infectious or inflammatory. The rectum and sigmoid colon are fluid-filled and nondistended. Rectal fecal impaction is not present. Otherwise moderate colonic stool burden without bowel obstruction. Potential right renal mass. Follow-up with nonurgent MRI of the kidneys. Labs unremarkable. Plan:  Pt had watery bowel movements since initiation of laxatives. Will continue senna and docusate at home. - Senokot S 8.6-50mg 1 tablet twice daily.    - PRN Dulcolax supossitory. Right renal mass  Assessment & Plan  Incidental finding of possible right renal mass on CT A/P, for which a nonemergent MRI is recommended. Discussed finding with patient's  and advised him to follow up with PCP. Will note incidental findings in chart. Results from last 7 days   Lab Units 11/01/23  1447   LEUKOCYTES UA  Negative   NITRITE UA  Negative   GLUCOSE UA mg/dl Negative   KETONES UA mg/dl Trace*   BLOOD UA  Negative   WBC UA /hpf 1-2   RBC UA /hpf 1-2   BACTERIA UA /hpf None Seen        Altered mental status-resolved as of 11/2/2023  Assessment & Plan  Per  patient has been more agitated for the past week which I suspect is likely in the setting of constipation.    She presented to the ED on 10/23 with worsening agitation and "sundowning", with UA concerning for UTI and treated with Keflex. Due to ongoing agitation she was transitioned to Ciprofloxacin by her PCP. Seen by outpatient neurology on 10/31 and prescribed Seroquel 12.5mg twice daily as needed, however  has not yet picked up this medication. Plan:  Start Seroquel 12.5mg twice daily. Delirium precautions  Maintain sleep-wake cycle. Rest of management as above. Alzheimer's dementia with behavioral disturbance ([address])  Assessment & Plan  Previously on Memantine 10mg daily. Follows up with [facility]  Prescribed Seroquel 12.5mg BID PRN for agitation on 10/31  Continue PTA Seroquel    Hypertension  Assessment & Plan  Continue PTA Lisinopril-HCTZ 20-25mg daily. Blood Pressure: 122/72         Medical Problems       Resolved Problems  Date Reviewed: 11/2/2023            Resolved    * (Principal) Constipation 11/2/2023     Resolved by  Kellen Best MD    Altered mental status 11/2/2023     Resolved by  Kellen Best MD        Discharging Resident: Kellen Best MD  Discharging Attending: Maria De Jesus Zaragoza MD  PCP: Mingo Allison MD  Admission Date:   Admission Orders (From admission, onward)       Ordered        11/01/23 1451  Place in Observation  Once                          Discharge Date: 11/02/23    Consultations During Hospital Stay:  none    Procedures Performed:   CT abdomen    Significant Findings / Test Results:   Nonspecific sigmoid colitis/proctitis, likely infectious or inflammatory. The rectum and sigmoid colon are fluid-filled and nondistended. Rectal fecal impaction is not present. Otherwise moderate colonic stool burden without bowel obstruction. Potential right renal mass. Follow-up with nonurgent MRI of the kidneys. Incidental Findings:   right renal mass. I reviewed the above mentioned incidental findings with the patient and/or family and they expressed understanding.     Test Results Pending at Discharge (will require follow up): MRI Kidney and f/u with Nephrology     Outpatient Tests Requested:  MRI Kidney and f/u with Nephrology    Complications:  none    Reason for Admission:  Constipation    Hospital Course:   Fransico Church is a 82 y.o. female patient who originally presented to the hospital on 11/1/2023 due to constipation. PMH is significant for pelvic organ prolapse s/p total vaginal hysterectomy 2020. Per  pt did not have BM since 2 weeks and did not pass flatus. During tis admission she had CT abdomen which revealed nonspecific sigmoid colitis/proctitis. The rectum and sigmoid colon are fluid-filled and nondistended. Rectal fecal impaction is not present. Otherwise moderate colonic stool burden without bowel obstruction. Pt was maintained on bowel regimen and had watery bowel movements. Denies abdominal pain, fever. Pt was on ciprofloxacin prior to admission by PCP for UTI. During this hospitalization she doesnot have any urinary symptoms, urine culture revealed Mixed Contaminants X3. Pt was advised to f/u with Nephrology for renal mass. Pt is stable to be discharged. Please see above list of diagnoses and related plan for additional information. Condition at Discharge: stable    Discharge Day Visit / Exam:   Subjective:  pt is at baseline mental status. No overnight events. Vitals: Blood Pressure: 122/72 (11/02/23 0810)  Pulse: 76 (11/02/23 0810)  Temperature: 98.4 °F (36.9 °C) (11/02/23 0810)  Temp Source: Oral (11/01/23 2107)  Respirations: 15 (11/02/23 0810)  Weight - Scale: 56.2 kg (124 lb) (11/01/23 1013)  SpO2: 98 % (11/02/23 0810)  Exam:   Physical Exam  Vitals reviewed. HENT:      Head: Atraumatic. Nose: Nose normal.   Eyes:      Extraocular Movements: Extraocular movements intact. Conjunctiva/sclera: Conjunctivae normal.   Cardiovascular:      Rate and Rhythm: Normal rate. Pulses: Normal pulses.    Pulmonary:      Effort: Pulmonary effort is normal. Abdominal:      General: Abdomen is flat. Bowel sounds are normal.   Musculoskeletal:         General: Normal range of motion. Right shoulder: Normal.      Left shoulder: Normal.      Right upper arm: Normal.      Left upper arm: Normal.      Right elbow: Normal.      Left elbow: Normal.   Feet:      Right foot:      Skin integrity: Skin integrity normal.      Left foot:      Skin integrity: Skin integrity normal.   Skin:     General: Skin is warm. Neurological:      Mental Status: She is alert. Mental status is at baseline. Discussion with Family: Updated  () via phone. Discharge instructions/Information to patient and family:   See after visit summary for information provided to patient and family. Provisions for Follow-Up Care:  See after visit summary for information related to follow-up care and any pertinent home health orders. Disposition:   Home    Planned Readmission: none  Discharge Medications:  See after visit summary for reconciled discharge medications provided to patient and/or family.       **Please Note: This note may have been constructed using a voice recognition system**

## 2023-11-02 NOTE — ASSESSMENT & PLAN NOTE
Previously on Memantine 10mg daily.    Follows up with [facility] neurology  Prescribed Seroquel 12.5mg BID PRN for agitation on 10/31  Continue PTA Seroquel

## 2023-11-03 NOTE — TELEPHONE ENCOUNTER
ID: [id number]   GROUP:   PCN: [id number]  BIN: [id number]    Key: [id number]    PA submitted, awaiting determination. [facility] [employer] at [phone].

## 2023-11-03 NOTE — TELEPHONE ENCOUNTER
Spouse called and asked where things are at with the prior authorization for Seroquel. SW reviewed [facility]. Spouse would like the medication authorized with insurance. Will request the same.

## 2023-11-09 NOTE — TELEPHONE ENCOUNTER
Purnima Plaza, DO  You48 minutes ago (1:18 PM)     [facility] [place]  Best for him to start slow and then increase if needed

## 2023-11-09 NOTE — TELEPHONE ENCOUNTER
Spoke to , patient has taken 1/2 tablet at bedtime for 5 days now with no change in sleeping habits and no side effects related to medication.  Advised at this point okay to use full tablet at bedtime but do not repeat in am.

## 2023-11-09 NOTE — TELEPHONE ENCOUNTER
RANDY vd call from patient's spouse. He requested for someone from clinical team let him know if he can give his wife the full dose of Seroquel at night instead of half the dose. RANDY informed would get message to clinical team and request nursing call back with information. Provided spouse phone number to office for clinical questions in the future however, if needed can call this writer as well. RANDY spoke with spouse about adult day programs and senior centers. He is interested in getting listings for both sent by mail. Patient continues to have aides 2hrs/day a couple times a week through [employer]. Patient does not meet eligibility for the waiver program.  SW to mail information and remains available.

## 2023-11-13 ENCOUNTER — APPOINTMENT (OUTPATIENT)
Dept: LAB | Facility: CLINIC | Age: 83
End: 2023-11-13
Payer: COMMERCIAL

## 2023-11-13 ENCOUNTER — TRANSCRIBE ORDERS (OUTPATIENT)
Dept: LAB | Facility: CLINIC | Age: 83
End: 2023-11-13

## 2023-11-13 DIAGNOSIS — F03.90 SENILE DEMENTIA, UNCOMPLICATED (HCC): ICD-10-CM

## 2023-11-13 DIAGNOSIS — Z00.00 ROUTINE GENERAL MEDICAL EXAMINATION AT A HEALTH CARE FACILITY: Primary | ICD-10-CM

## 2023-11-13 DIAGNOSIS — Z00.00 ROUTINE GENERAL MEDICAL EXAMINATION AT A HEALTH CARE FACILITY: ICD-10-CM

## 2023-11-13 LAB
ALBUMIN SERPL BCP-MCNC: 4.4 G/DL (ref 3.5–5)
ALP SERPL-CCNC: 54 U/L (ref 34–104)
ALT SERPL W P-5'-P-CCNC: 14 U/L (ref 7–52)
ANION GAP SERPL CALCULATED.3IONS-SCNC: 5 MMOL/L
AST SERPL W P-5'-P-CCNC: 16 U/L (ref 13–39)
BASOPHILS # BLD AUTO: 0.02 THOUSANDS/ÂΜL (ref 0–0.1)
BASOPHILS NFR BLD AUTO: 0 % (ref 0–1)
BILIRUB SERPL-MCNC: 0.71 MG/DL (ref 0.2–1)
BUN SERPL-MCNC: 25 MG/DL (ref 5–25)
CALCIUM SERPL-MCNC: 9.9 MG/DL (ref 8.4–10.2)
CHLORIDE SERPL-SCNC: 105 MMOL/L (ref 96–108)
CHOLEST SERPL-MCNC: 150 MG/DL
CO2 SERPL-SCNC: 33 MMOL/L (ref 21–32)
CREAT SERPL-MCNC: 0.83 MG/DL (ref 0.6–1.3)
EOSINOPHIL # BLD AUTO: 0.08 THOUSAND/ÂΜL (ref 0–0.61)
EOSINOPHIL NFR BLD AUTO: 2 % (ref 0–6)
ERYTHROCYTE [DISTWIDTH] IN BLOOD BY AUTOMATED COUNT: 12.8 % (ref 11.6–15.1)
GFR SERPL CREATININE-BSD FRML MDRD: 66 ML/MIN/1.73SQ M
GLUCOSE P FAST SERPL-MCNC: 90 MG/DL (ref 65–99)
HCT VFR BLD AUTO: 40.9 % (ref 34.8–46.1)
HDLC SERPL-MCNC: 69 MG/DL
HGB BLD-MCNC: 14 G/DL (ref 11.5–15.4)
IMM GRANULOCYTES # BLD AUTO: 0.02 THOUSAND/UL (ref 0–0.2)
IMM GRANULOCYTES NFR BLD AUTO: 0 % (ref 0–2)
LDLC SERPL CALC-MCNC: 67 MG/DL (ref 0–100)
LYMPHOCYTES # BLD AUTO: 2.03 THOUSANDS/ÂΜL (ref 0.6–4.47)
LYMPHOCYTES NFR BLD AUTO: 37 % (ref 14–44)
MCH RBC QN AUTO: 31.5 PG (ref 26.8–34.3)
MCHC RBC AUTO-ENTMCNC: 34.2 G/DL (ref 31.4–37.4)
MCV RBC AUTO: 92 FL (ref 82–98)
MONOCYTES # BLD AUTO: 0.46 THOUSAND/ÂΜL (ref 0.17–1.22)
MONOCYTES NFR BLD AUTO: 8 % (ref 4–12)
NEUTROPHILS # BLD AUTO: 2.89 THOUSANDS/ÂΜL (ref 1.85–7.62)
NEUTS SEG NFR BLD AUTO: 53 % (ref 43–75)
NONHDLC SERPL-MCNC: 81 MG/DL
NRBC BLD AUTO-RTO: 0 /100 WBCS
PLATELET # BLD AUTO: 138 THOUSANDS/UL (ref 149–390)
PMV BLD AUTO: 11.6 FL (ref 8.9–12.7)
POTASSIUM SERPL-SCNC: 4.2 MMOL/L (ref 3.5–5.3)
PROT SERPL-MCNC: 6.8 G/DL (ref 6.4–8.4)
RBC # BLD AUTO: 4.44 MILLION/UL (ref 3.81–5.12)
SODIUM SERPL-SCNC: 143 MMOL/L (ref 135–147)
TRIGL SERPL-MCNC: 68 MG/DL
WBC # BLD AUTO: 5.5 THOUSAND/UL (ref 4.31–10.16)

## 2023-11-13 PROCEDURE — 80053 COMPREHEN METABOLIC PANEL: CPT

## 2023-11-13 PROCEDURE — 36415 COLL VENOUS BLD VENIPUNCTURE: CPT

## 2023-11-13 PROCEDURE — 80061 LIPID PANEL: CPT

## 2023-11-13 PROCEDURE — 85025 COMPLETE CBC W/AUTO DIFF WBC: CPT

## 2023-11-25 DIAGNOSIS — F02.818 ALZHEIMER'S DEMENTIA WITH BEHAVIORAL DISTURBANCE (HCC): ICD-10-CM

## 2023-11-25 DIAGNOSIS — G30.9 ALZHEIMER'S DEMENTIA WITH BEHAVIORAL DISTURBANCE (HCC): ICD-10-CM

## 2023-11-27 RX ORDER — QUETIAPINE FUMARATE 25 MG/1
TABLET, FILM COATED ORAL
Qty: 90 TABLET | Refills: 2 | Status: SHIPPED | OUTPATIENT
Start: 2023-11-27 | End: 2023-12-08

## 2023-11-28 ENCOUNTER — TELEPHONE (OUTPATIENT)
Dept: NEUROLOGY | Facility: CLINIC | Age: 83
End: 2023-11-28

## 2023-11-28 DIAGNOSIS — F02.818 ALZHEIMER'S DEMENTIA WITH BEHAVIORAL DISTURBANCE (HCC): Primary | ICD-10-CM

## 2023-11-28 DIAGNOSIS — G30.9 ALZHEIMER'S DEMENTIA WITH BEHAVIORAL DISTURBANCE (HCC): Primary | ICD-10-CM

## 2023-11-28 DIAGNOSIS — F05 SUNDOWNING: ICD-10-CM

## 2023-11-28 NOTE — TELEPHONE ENCOUNTER
received vm from 11/28 at 10:17am-Yeah, this is Tarun cruz I'm calling for my wife, Tootie cruz, Who is a patient of Dr. Deja Morin. She's having a lot of problems with sundown effects. And I was wondering if the doctor can prescribe something new or if I should continue with the quetiapine medication. i  give a full dose at night. I was wondering if I could give her another dose in a morning or what I should do. Please call me back. Thank you.   [phone]

## 2023-11-29 PROBLEM — F05 SUNDOWNING: Status: ACTIVE | Noted: 2023-11-29

## 2023-11-29 RX ORDER — LANOLIN ALCOHOL/MO/W.PET/CERES
CREAM (GRAM) TOPICAL
Qty: 30 TABLET | Refills: 11 | Status: SHIPPED | OUTPATIENT
Start: 2023-11-29

## 2023-11-29 NOTE — TELEPHONE ENCOUNTER
Rogelio Taylor, DO  You24 minutes ago (11:28 AM)     [id number]  Let the  know that sundowning is not uncommon in dementia patients and we can certainly try to address it. One of the key things is to improve her sleep wake cycle and so for that he should start her on melatonin 3mg 2 hours before bedtime. Have him start her on this lower dose and give it about a week and if it has not had any effect have him increase the Seroquel to 25mg every night and give this a week, if she is still having issues have him increase the melatonin to 6mg and let us know how she is doing.

## 2023-11-29 NOTE — TELEPHONE ENCOUNTER
Spoke with Ty Denis. Pt takes one tablet of Quetiapine 25 mg between 8:30 pm- 9 pm. Takes no quetiapine in the AM. Pt is sundowning around 7 pm. Pt has more confusion, paces, and wanders. Ty Denis was calling to ask if the medication should be adjusted, or if there is another med that pt can try.
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
## 2023-12-05 ENCOUNTER — APPOINTMENT (EMERGENCY)
Dept: CT IMAGING | Facility: HOSPITAL | Age: 83
DRG: 536 | End: 2023-12-05
Payer: COMMERCIAL

## 2023-12-05 ENCOUNTER — HOSPITAL ENCOUNTER (INPATIENT)
Facility: HOSPITAL | Age: 83
LOS: 3 days | Discharge: NURSING, REHAB OR LONG-TERM CARE | DRG: 536 | End: 2023-12-08
Attending: EMERGENCY MEDICINE | Admitting: STUDENT IN AN ORGANIZED HEALTH CARE EDUCATION/TRAINING PROGRAM
Payer: COMMERCIAL

## 2023-12-05 ENCOUNTER — APPOINTMENT (EMERGENCY)
Dept: RADIOLOGY | Facility: HOSPITAL | Age: 83
DRG: 536 | End: 2023-12-05
Payer: COMMERCIAL

## 2023-12-05 DIAGNOSIS — R63.4 WEIGHT LOSS: ICD-10-CM

## 2023-12-05 DIAGNOSIS — F02.818 ALZHEIMER'S DEMENTIA WITH BEHAVIORAL DISTURBANCE (HCC): ICD-10-CM

## 2023-12-05 DIAGNOSIS — K76.9 LIVER LESION: ICD-10-CM

## 2023-12-05 DIAGNOSIS — S52.509A DISTAL RADIUS FRACTURE: ICD-10-CM
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
## 2023-12-05 DIAGNOSIS — G93.40 ACUTE ENCEPHALOPATHY: ICD-10-CM

## 2023-12-05 DIAGNOSIS — S32.409A ACETABULAR FRACTURE (HCC): Primary | ICD-10-CM

## 2023-12-05 DIAGNOSIS — G30.9 ALZHEIMER'S DEMENTIA WITH BEHAVIORAL DISTURBANCE (HCC): ICD-10-CM

## 2023-12-05 DIAGNOSIS — E87.6 HYPOKALEMIA: ICD-10-CM

## 2023-12-05 PROBLEM — R33.9 URINARY RETENTION: Status: ACTIVE | Noted: 2023-12-05

## 2023-12-05 PROBLEM — R74.8 ELEVATED CREATINE KINASE: Status: ACTIVE | Noted: 2023-12-05

## 2023-12-05 PROBLEM — S62.101A WRIST FRACTURE, CLOSED, RIGHT, INITIAL ENCOUNTER: Status: ACTIVE | Noted: 2023-12-05

## 2023-12-05 PROBLEM — R60.0 EDEMA OF LEFT LOWER EXTREMITY: Status: ACTIVE | Noted: 2023-12-05

## 2023-12-05 PROBLEM — S32.401A ACETABULUM FRACTURE, RIGHT (HCC): Status: ACTIVE | Noted: 2023-12-05

## 2023-12-05 LAB
25(OH)D3 SERPL-MCNC: 54.7 NG/ML (ref 30–100)
2HR DELTA HS TROPONIN: 4 NG/L
4HR DELTA HS TROPONIN: 9 NG/L
ALBUMIN SERPL BCP-MCNC: 4.1 G/DL (ref 3.5–5)
ALP SERPL-CCNC: 42 U/L (ref 34–104)
ALT SERPL W P-5'-P-CCNC: 17 U/L (ref 7–52)
ANION GAP SERPL CALCULATED.3IONS-SCNC: 7 MMOL/L
AST SERPL W P-5'-P-CCNC: 24 U/L (ref 13–39)
ATRIAL RATE: 147 BPM
ATRIAL RATE: 95 BPM
BASOPHILS # BLD AUTO: 0.01 THOUSANDS/ÂΜL (ref 0–0.1)
BASOPHILS NFR BLD AUTO: 0 % (ref 0–1)
BILIRUB SERPL-MCNC: 1.05 MG/DL (ref 0.2–1)
BILIRUB UR QL STRIP: NEGATIVE
BUN SERPL-MCNC: 30 MG/DL (ref 5–25)
CALCIUM SERPL-MCNC: 9.3 MG/DL (ref 8.4–10.2)
CARDIAC TROPONIN I PNL SERPL HS: 32 NG/L
CARDIAC TROPONIN I PNL SERPL HS: 36 NG/L
CARDIAC TROPONIN I PNL SERPL HS: 41 NG/L
CHLORIDE SERPL-SCNC: 103 MMOL/L (ref 96–108)
CK SERPL-CCNC: 471 U/L (ref 26–192)
CLARITY UR: CLEAR
CO2 SERPL-SCNC: 30 MMOL/L (ref 21–32)
COLOR UR: ABNORMAL
CREAT SERPL-MCNC: 0.81 MG/DL (ref 0.6–1.3)
EOSINOPHIL # BLD AUTO: 0.01 THOUSAND/ÂΜL (ref 0–0.61)
EOSINOPHIL NFR BLD AUTO: 0 % (ref 0–6)
ERYTHROCYTE [DISTWIDTH] IN BLOOD BY AUTOMATED COUNT: 13.1 % (ref 11.6–15.1)
GFR SERPL CREATININE-BSD FRML MDRD: 68 ML/MIN/1.73SQ M
GLUCOSE SERPL-MCNC: 119 MG/DL (ref 65–140)
GLUCOSE UR STRIP-MCNC: NEGATIVE MG/DL
HCT VFR BLD AUTO: 35.5 % (ref 34.8–46.1)
HGB BLD-MCNC: 12 G/DL (ref 11.5–15.4)
HGB UR QL STRIP.AUTO: NEGATIVE
IMM GRANULOCYTES # BLD AUTO: 0.06 THOUSAND/UL (ref 0–0.2)
IMM GRANULOCYTES NFR BLD AUTO: 1 % (ref 0–2)
KETONES UR STRIP-MCNC: ABNORMAL MG/DL
LEUKOCYTE ESTERASE UR QL STRIP: NEGATIVE
LYMPHOCYTES # BLD AUTO: 0.81 THOUSANDS/ÂΜL (ref 0.6–4.47)
LYMPHOCYTES NFR BLD AUTO: 9 % (ref 14–44)
MAGNESIUM SERPL-MCNC: 1.8 MG/DL (ref 1.9–2.7)
MCH RBC QN AUTO: 31.3 PG (ref 26.8–34.3)
MCHC RBC AUTO-ENTMCNC: 33.8 G/DL (ref 31.4–37.4)
MCV RBC AUTO: 93 FL (ref 82–98)
MONOCYTES # BLD AUTO: 0.81 THOUSAND/ÂΜL (ref 0.17–1.22)
MONOCYTES NFR BLD AUTO: 9 % (ref 4–12)
NEUTROPHILS # BLD AUTO: 7.79 THOUSANDS/ÂΜL (ref 1.85–7.62)
NEUTS SEG NFR BLD AUTO: 81 % (ref 43–75)
NITRITE UR QL STRIP: NEGATIVE
NRBC BLD AUTO-RTO: 0 /100 WBCS
P AXIS: 61 DEGREES
P AXIS: 99 DEGREES
PH UR STRIP.AUTO: 6.5 [PH]
PLATELET # BLD AUTO: 98 THOUSANDS/UL (ref 149–390)
PLATELET BLD QL SMEAR: ABNORMAL
PMV BLD AUTO: 10.8 FL (ref 8.9–12.7)
POTASSIUM SERPL-SCNC: 3.4 MMOL/L (ref 3.5–5.3)
PR INTERVAL: 154 MS
PR INTERVAL: 158 MS
PROT SERPL-MCNC: 6.2 G/DL (ref 6.4–8.4)
PROT UR STRIP-MCNC: NEGATIVE MG/DL
QRS AXIS: -25 DEGREES
QRS AXIS: -37 DEGREES
QRSD INTERVAL: 70 MS
QRSD INTERVAL: 70 MS
QT INTERVAL: 374 MS
QT INTERVAL: 376 MS
QTC INTERVAL: 494 MS
QTC INTERVAL: 501 MS
RBC # BLD AUTO: 3.83 MILLION/UL (ref 3.81–5.12)
RBC MORPH BLD: NORMAL
SODIUM SERPL-SCNC: 140 MMOL/L (ref 135–147)
SP GR UR STRIP.AUTO: 1.02 (ref 1–1.03)
T WAVE AXIS: 62 DEGREES
T WAVE AXIS: 66 DEGREES
TSH SERPL DL<=0.05 MIU/L-ACNC: 0.69 UIU/ML (ref 0.45–4.5)
UROBILINOGEN UR STRIP-ACNC: <2 MG/DL
VENTRICULAR RATE: 104 BPM
VENTRICULAR RATE: 108 BPM
VIT B12 SERPL-MCNC: 302 PG/ML (ref 180–914)
WBC # BLD AUTO: 9.49 THOUSAND/UL (ref 4.31–10.16)

## 2023-12-05 PROCEDURE — 96374 THER/PROPH/DIAG INJ IV PUSH: CPT

## 2023-12-05 PROCEDURE — 36415 COLL VENOUS BLD VENIPUNCTURE: CPT | Performed by: EMERGENCY MEDICINE

## 2023-12-05 PROCEDURE — 73552 X-RAY EXAM OF FEMUR 2/>: CPT

## 2023-12-05 PROCEDURE — 81003 URINALYSIS AUTO W/O SCOPE: CPT | Performed by: EMERGENCY MEDICINE

## 2023-12-05 PROCEDURE — 73110 X-RAY EXAM OF WRIST: CPT

## 2023-12-05 PROCEDURE — 99285 EMERGENCY DEPT VISIT HI MDM: CPT

## 2023-12-05 PROCEDURE — 84443 ASSAY THYROID STIM HORMONE: CPT | Performed by: FAMILY MEDICINE

## 2023-12-05 PROCEDURE — 84484 ASSAY OF TROPONIN QUANT: CPT | Performed by: EMERGENCY MEDICINE

## 2023-12-05 PROCEDURE — 99244 OFF/OP CNSLTJ NEW/EST MOD 40: CPT | Performed by: FAMILY MEDICINE

## 2023-12-05 PROCEDURE — 71045 X-RAY EXAM CHEST 1 VIEW: CPT

## 2023-12-05 PROCEDURE — 96361 HYDRATE IV INFUSION ADD-ON: CPT

## 2023-12-05 PROCEDURE — 82550 ASSAY OF CK (CPK): CPT | Performed by: EMERGENCY MEDICINE

## 2023-12-05 PROCEDURE — G1004 CDSM NDSC: HCPCS

## 2023-12-05 PROCEDURE — 74177 CT ABD & PELVIS W/CONTRAST: CPT

## 2023-12-05 PROCEDURE — 85025 COMPLETE CBC W/AUTO DIFF WBC: CPT | Performed by: EMERGENCY MEDICINE

## 2023-12-05 PROCEDURE — 99285 EMERGENCY DEPT VISIT HI MDM: CPT | Performed by: EMERGENCY MEDICINE

## 2023-12-05 PROCEDURE — 99222 1ST HOSP IP/OBS MODERATE 55: CPT | Performed by: ORTHOPAEDIC SURGERY

## 2023-12-05 PROCEDURE — 82306 VITAMIN D 25 HYDROXY: CPT | Performed by: FAMILY MEDICINE

## 2023-12-05 PROCEDURE — 80053 COMPREHEN METABOLIC PANEL: CPT | Performed by: EMERGENCY MEDICINE

## 2023-12-05 PROCEDURE — 70450 CT HEAD/BRAIN W/O DYE: CPT

## 2023-12-05 PROCEDURE — 93005 ELECTROCARDIOGRAM TRACING: CPT

## 2023-12-05 PROCEDURE — 82607 VITAMIN B-12: CPT | Performed by: FAMILY MEDICINE

## 2023-12-05 PROCEDURE — 83735 ASSAY OF MAGNESIUM: CPT | Performed by: FAMILY MEDICINE

## 2023-12-05 PROCEDURE — 72125 CT NECK SPINE W/O DYE: CPT

## 2023-12-05 PROCEDURE — 99223 1ST HOSP IP/OBS HIGH 75: CPT | Performed by: STUDENT IN AN ORGANIZED HEALTH CARE EDUCATION/TRAINING PROGRAM

## 2023-12-05 RX ORDER — FELODIPINE 2.5 MG/1
10 TABLET, EXTENDED RELEASE ORAL DAILY
Status: DISCONTINUED | OUTPATIENT
Start: 2023-12-05 | End: 2023-12-08 | Stop reason: HOSPADM

## 2023-12-05 RX ORDER — POTASSIUM CHLORIDE 14.9 MG/ML
20 INJECTION INTRAVENOUS ONCE
Status: COMPLETED | OUTPATIENT
Start: 2023-12-05 | End: 2023-12-05

## 2023-12-05 RX ORDER — ENOXAPARIN SODIUM 100 MG/ML
30 INJECTION SUBCUTANEOUS EVERY 12 HOURS
Status: DISCONTINUED | OUTPATIENT
Start: 2023-12-05 | End: 2023-12-08 | Stop reason: HOSPADM

## 2023-12-05 RX ORDER — POLYETHYLENE GLYCOL 3350 17 G/17G
17 POWDER, FOR SOLUTION ORAL DAILY
Status: DISCONTINUED | OUTPATIENT
Start: 2023-12-05 | End: 2023-12-08 | Stop reason: HOSPADM

## 2023-12-05 RX ORDER — MULTIVIT WITH MINERALS/LUTEIN
250 TABLET ORAL DAILY
Status: DISCONTINUED | OUTPATIENT
Start: 2023-12-05 | End: 2023-12-08 | Stop reason: HOSPADM

## 2023-12-05 RX ORDER — MELATONIN
2000 DAILY
Status: DISCONTINUED | OUTPATIENT
Start: 2023-12-06 | End: 2023-12-08 | Stop reason: HOSPADM

## 2023-12-05 RX ORDER — GABAPENTIN 100 MG/1
100 CAPSULE ORAL 3 TIMES DAILY
Status: DISCONTINUED | OUTPATIENT
Start: 2023-12-05 | End: 2023-12-05

## 2023-12-05 RX ORDER — AMOXICILLIN 250 MG
1 CAPSULE ORAL 2 TIMES DAILY
Status: DISCONTINUED | OUTPATIENT
Start: 2023-12-05 | End: 2023-12-08 | Stop reason: HOSPADM

## 2023-12-05 RX ORDER — GABAPENTIN 100 MG/1
100 CAPSULE ORAL 3 TIMES DAILY
Status: DISCONTINUED | OUTPATIENT
Start: 2023-12-05 | End: 2023-12-08 | Stop reason: HOSPADM

## 2023-12-05 RX ORDER — LISINOPRIL 20 MG/1
20 TABLET ORAL DAILY
Status: DISCONTINUED | OUTPATIENT
Start: 2023-12-05 | End: 2023-12-07

## 2023-12-05 RX ORDER — LANOLIN ALCOHOL/MO/W.PET/CERES
3 CREAM (GRAM) TOPICAL
Status: DISCONTINUED | OUTPATIENT
Start: 2023-12-05 | End: 2023-12-08 | Stop reason: HOSPADM

## 2023-12-05 RX ORDER — ACETAMINOPHEN 325 MG/1
975 TABLET ORAL EVERY 8 HOURS SCHEDULED
Status: DISCONTINUED | OUTPATIENT
Start: 2023-12-05 | End: 2023-12-08 | Stop reason: HOSPADM

## 2023-12-05 RX ORDER — QUETIAPINE FUMARATE 25 MG/1
25 TABLET, FILM COATED ORAL
Status: DISCONTINUED | OUTPATIENT
Start: 2023-12-05 | End: 2023-12-05

## 2023-12-05 RX ORDER — HYDROCHLOROTHIAZIDE 25 MG/1
25 TABLET ORAL DAILY
Status: DISCONTINUED | OUTPATIENT
Start: 2023-12-05 | End: 2023-12-07

## 2023-12-05 RX ADMIN — HYDROCHLOROTHIAZIDE 25 MG: 25 TABLET ORAL at 12:49

## 2023-12-05 RX ADMIN — GABAPENTIN 100 MG: 100 CAPSULE ORAL at 13:16

## 2023-12-05 RX ADMIN — ACETAMINOPHEN 975 MG: 325 TABLET, FILM COATED ORAL at 12:49

## 2023-12-05 RX ADMIN — SENNOSIDES AND DOCUSATE SODIUM 1 TABLET: 8.6; 5 TABLET ORAL at 19:03

## 2023-12-05 RX ADMIN — SODIUM CHLORIDE 500 ML: 0.9 INJECTION, SOLUTION INTRAVENOUS at 07:34

## 2023-12-05 RX ADMIN — SENNOSIDES AND DOCUSATE SODIUM 1 TABLET: 8.6; 5 TABLET ORAL at 12:48

## 2023-12-05 RX ADMIN — POLYETHYLENE GLYCOL 3350 17 G: 17 POWDER, FOR SOLUTION ORAL at 12:47

## 2023-12-05 RX ADMIN — IOHEXOL 85 ML: 350 INJECTION, SOLUTION INTRAVENOUS at 08:14

## 2023-12-05 RX ADMIN — Medication 250 MG: at 12:49

## 2023-12-05 RX ADMIN — ENOXAPARIN SODIUM 30 MG: 30 INJECTION SUBCUTANEOUS at 21:53

## 2023-12-05 RX ADMIN — LISINOPRIL 20 MG: 10 TABLET ORAL at 12:49

## 2023-12-05 RX ADMIN — GABAPENTIN 100 MG: 100 CAPSULE ORAL at 21:48

## 2023-12-05 RX ADMIN — Medication 3 MG: at 21:48

## 2023-12-05 RX ADMIN — ACETAMINOPHEN 975 MG: 325 TABLET, FILM COATED ORAL at 21:48

## 2023-12-05 RX ADMIN — ENOXAPARIN SODIUM 30 MG: 30 INJECTION SUBCUTANEOUS at 09:46

## 2023-12-05 RX ADMIN — FELODIPINE 10 MG: 2.5 TABLET, FILM COATED, EXTENDED RELEASE ORAL at 12:48

## 2023-12-05 RX ADMIN — POTASSIUM CHLORIDE 20 MEQ: 14.9 INJECTION, SOLUTION INTRAVENOUS at 09:12

## 2023-12-05 NOTE — ASSESSMENT & PLAN NOTE
Edema of LLE noted 12/05  Associated ecchymosis of LLE  Consider in the context of fall   Continue to monitor  Recommend b/l LE doppler

## 2023-12-05 NOTE — ASSESSMENT & PLAN NOTE
- Possible right wrist fracture on XR with overlying ecchymosis, present on admission.  - Status post fall on 12/2. - Appreciate Orthopedic surgery evaluation, recommendations and interventions as noted. - Likely subacute/ chronic. Non operative management  - Maintain WBAT RUE  - Monitor right upper extremity neurovascular exam.  - Continue multimodal analgesic regimen.  - Continue DVT prophylaxis. - PT and OT evaluation and treatment as indicated. - Outpatient follow up with Orthopedic surgery for re-evaluation.

## 2023-12-05 NOTE — H&P
[address]  H&P  Name: Rei Gold 82 y.o. female I MRN: [id number]  Unit/Bed#: ED-37 I Date of Admission: 12/5/2023   Date of Service: 12/5/2023 I Hospital Day: 0      Assessment/Plan   Wrist fracture, closed, right, initial encounter  Assessment & Plan  - Possible right wrist fracture on XR with overlying ecchymosis, present on admission.  - Status post fall on 12/2. - Appreciate Orthopedic surgery evaluation, recommendations and interventions as noted. - Likely subacute/ chronic. Non operative management  - Maintain WBAT RUE  - Monitor right upper extremity neurovascular exam.  - Continue multimodal analgesic regimen.  - Continue DVT prophylaxis. - PT and OT evaluation and treatment as indicated. - Outpatient follow up with Orthopedic surgery for re-evaluation. Fall  Assessment & Plan  - Status post fall with the below noted injuries. - Fall precautions. - Geriatric Medicine consultation for evaluation, medication review and recommendations.  - PT and OT evaluation and treatment as indicated. - Case Management consultation for disposition planning. Alzheimer's dementia with behavioral disturbance ([address])  Assessment & Plan  - History of dementia  - Lives with  at independent living   - Geriatric medicine consult    * Acetabulum fracture, right ([address])  Assessment & Plan  - Mildly displaced right acetabulum fracture, present on admission  - Status post fall on Saturday, 12/2. - Appreciate Orthopedic surgery evaluation, recommendations and interventions as noted. - Non operative management  - Maintain WBAT RLE   - Monitor right lower extremity neurovascular exam.  - Continue multimodal analgesic regimen.  - Continue DVT prophylaxis. - PT and OT evaluation and treatment as indicated. - Outpatient follow up with Orthopedic surgery for re-evaluation.                Trauma Alert: Evaluation   Model of Arrival: Ambulance    Trauma Team: Attending Gary Last and AARON [address]  Consultants:     Orthopedics: routine consult; Epic consult order placed; History of Present Illness     Chief Complaint: Fall  Mechanism:Fall     HPI:    Joey Hawthorne is a 82 y.o. female who presents after a fall 3 days prior. Patient has history of dementia and is a poor historian,  who she lives with is at bedside.  reports he heard the patient fall in the bedroom and went directly to her side, she was on her right side. He feels she did not hit her head or lose consciousness, and he reports she does not take any AC/AP medications. He reports he was able to get the patient up and into bed, and he reports she had pain with going to the bathroom ever since and hasn't been getting out of bed much because of the pain. He reports that when she's in bed, she has no pain. He also reports she has been more confused over the past 24 hrs. Review of Systems   Unable to perform ROS: Dementia     12-point, complete review of systems was reviewed and negative except as stated above.      Historical Information     Past Medical History:   Diagnosis Date    Altered mental status 11/01/2023    Constipation 11/01/2023    Dementia ([address])     Hypertension      Past Surgical History:   Procedure Laterality Date    COLONOSCOPY      DILATION AND CURETTAGE OF UTERUS      VA ANTERIOR COLPORRAPHY RPR CYSTOCELE W/CYSTO N/A 1/21/2020    Procedure: COLPORRHAPHY;  Surgeon: Charly Poe MD;  Location: [facility];  Service: Gynecology    VA VAGINAL HYSTERECTOMY UTERUS 250 GM/< N/A 1/21/2020    Procedure: HYSTERECTOMY VAGINAL TOTAL (TVH) left salpingectomy;  Surgeon: Charly Poe MD;  Location: [facility];  Service: Gynecology    WRIST FRACTURE SURGERY Right         Social History     Tobacco Use    Smoking status: Never    Smokeless tobacco: Never   Vaping Use    Vaping Use: Never used   Substance Use Topics    Alcohol use: Not Currently    Drug use: Never     Immunization History   Administered Date(s) Administered    COVID-19 MODERNA VACC 0.5 ML IM 01/28/2021, 02/25/2021, 10/26/2021    Influenza, high dose seasonal 0.7 mL 01/22/2020     Last Tetanus: unknown  Family History: Non-contributory    1. Before the illness or injury that brought you to the Emergency, did you need someone to help you on a regular basis? 1=Yes   2. Since the illness or injury that brought you to the Emergency, have you needed more help than usual to take care of yourself? 1=Yes   3. Have you been hospitalized for one or more nights during the past 6 months (excluding a stay in the Emergency Department)? 1=Yes   4. In general, do you see well? 1=No   5. In general, do you have serious problems with your memory? 1=Yes   6. Do you take more than three different medications everyday?  1=Yes   TOTAL   6     Did you order a geriatric consult if the score was 2 or greater?: yes     Meds/Allergies   all current active meds have been reviewed     Allergies   Allergen Reactions    Sulfa Antibiotics      Doesn't remember reaction       Objective   Initial Vitals:   Temperature: 98.7 °F (37.1 °C) (12/05/23 0701)  Pulse: 80 (12/05/23 0701)  Respirations: 17 (12/05/23 0701)  Blood Pressure: 130/59 (12/05/23 0701)    Primary Survey:   Airway:        Status: patent;        Pre-hospital Interventions: none        Hospital Interventions: none  Breathing:        Pre-hospital Interventions: none       Effort: normal       Right breath sounds: normal       Left breath sounds: normal  Circulation:        Rhythm: regular       Rate: regular   Right Pulses Left Pulses    R radial: 2+  R femoral: 2+  R pedal: 2+     L radial: 2+  L femoral: 2+  L pedal: 2+       Disability:        GCS: Eye: 4; Verbal: 4 Motor: 6 Total: 14       Right Pupil: 3 mm;  round;  reactive         Left Pupil:  3 mm;  round;  reactive      R Motor Strength L Motor Strength    R : 5/5  R dorsiflex: 5/5  R plantarflex: 5/5 L : 5/5  L dorsiflex: 5/5  L plantarflex: 5/5        Sensory: No sensory deficit  Exposure:       Completed: Yes      Secondary Survey:  Physical Exam  Vitals reviewed. Constitutional:       General: She is not in acute distress. Appearance: Normal appearance. HENT:      Head: Normocephalic and atraumatic. Right Ear: External ear normal.      Left Ear: External ear normal.      Nose: Nose normal.      Mouth/Throat:      Mouth: Mucous membranes are moist.      Pharynx: Oropharynx is clear. Eyes:      Extraocular Movements: Extraocular movements intact. Conjunctiva/sclera: Conjunctivae normal.      Pupils: Pupils are equal, round, and reactive to light. Cardiovascular:      Rate and Rhythm: Normal rate and regular rhythm. Pulses: Normal pulses. Heart sounds: Normal heart sounds. Pulmonary:      Effort: Pulmonary effort is normal. No respiratory distress. Breath sounds: Normal breath sounds. Chest:      Chest wall: No tenderness. Abdominal:      General: Abdomen is flat. Bowel sounds are normal. There is no distension. Palpations: Abdomen is soft. There is no mass. Tenderness: There is no abdominal tenderness. There is no guarding or rebound. Hernia: No hernia is present. Musculoskeletal:         General: Tenderness and signs of injury present. No swelling or deformity. Cervical back: Normal range of motion and neck supple. No rigidity or tenderness. Comments: Right wrist ecchymosis   ROM RLE limited due to pain   Skin:     General: Skin is warm and dry. Capillary Refill: Capillary refill takes less than 2 seconds. Findings: Bruising present. No lesion. Neurological:      General: No focal deficit present. Mental Status: She is alert. She is disoriented. Sensory: No sensory deficit. Motor: No weakness.    Psychiatric:         Mood and Affect: Mood normal.         Behavior: Behavior normal.         Invasive Devices       Peripheral Intravenous Line  Duration             Peripheral IV 12/05/23 Right Antecubital <1 day                  Lab Results: I have personally reviewed all pertinent laboratory/test results from 12/05/23, including the preceding 24 hours. Recent Labs     12/05/23  0727 12/05/23  0949   WBC 9.49  --    HGB 12.0  --    HCT 35.5  --    PLT 98*  --    SODIUM 140  --    K 3.4*  --      --    CO2 30  --    BUN 30*  --    CREATININE 0.81  --    GLUC 119  --    MG 1.8*  --    AST 24  --    ALT 17  --    ALB 4.1  --    TBILI 1.05*  --    ALKPHOS 42  --    HSTNI0 32  --    HSTNI2  --  36       Imaging Results: I have personally reviewed pertinent images saved in PACS. CT scan findings (and other pertinent positive findings on images) were discussed with radiology. My interpretation of the images/reports are as follows:  Chest Xray(s): negative for acute findings   FAST exam(s): N/A   CT Scan(s): negative for acute findings   Additional Xray(s): positive for acute findings: see below     Other Studies:   XR femur 2 views RIGHT   ED Interpretation by Bill Mendenhall MD (12/05 5909)   Per my independent interpretation: No acute osseous abnormality      Final Result by Guerda Peng MD (12/05 4396)      No acute osseous abnormality. Workstation performed: [id number]         XR wrist 3+ views RIGHT   Final Result by Guerda Peng MD (12/05 4526)      Possible nondisplaced fracture at the distal radial metaphysis. Correlate with site of pain. The study was marked in [facility] for immediate notification. Workstation performed: [id number]         XR chest 1 view portable   ED Interpretation by Bill Mendenhall MD (01/32 1872)   Per my independent interpretation: No acute cardiopulmonary process      Final Result by Guerda Peng MD (12/05 0188)      No active pulmonary disease.                   Workstation performed: [id number]         CT head without contrast   Final Result by Osmar Muniz MD (18/50 9052)      No acute intracranial abnormality. Chronic findings, as above. Workstation performed: [id number]         CT spine cervical without contrast   Final Result by Luis Sexton MD (12/05 0848)      No cervical spine fracture or traumatic malalignment. Workstation performed: [id number]         CT abdomen pelvis with contrast   Final Result by Luis Sexton MD (86/78 8824)      Right pubic bone and acetabular fractures. No acute intra-abdominal pathology. Fecal retention in the redundant colon, mild. Indeterminate hypodense 1.3 cm right hepatic lobe lesion, poorly marginated. Recommend further characterization with nonemergent, possibly outpatient MRI with and without contrast.   Other findings, as per the body of the report. Workstation performed: [id number]               Code Status: Level 3 - DNAR and DNI  Advance Directive and Living Will:      Power of :    POLST:    I have spent 30 minutes with Patient and family today in which greater than 50% of this time was spent in counseling/coordination of care regarding Diagnostic results, Prognosis, Risks and benefits of tx options, Instructions for management, Patient and family education, Importance of tx compliance, Risk factor reductions, Impressions, Counseling / Coordination of care, Documenting in the medical record, Reviewing / ordering tests, medicine, procedures  , Obtaining or reviewing history  , and Communicating with other healthcare professionals .

## 2023-12-05 NOTE — CONSULTS
Orthopedics   Renée Dempsey 82 y.o. female MRN: [id number]  Unit/Bed#: AN XRAY      Chief Complaint:   S/p fall    HPI:  82 y.o. female community ambulator with PMH advanced dementia, hypertension, constipation, who presents s/p fall. She is accompanied by her spouse at bedside who provides history. He reports that she fell on Sunday night, and has been in bed since the fall. She has had underlying dementia longstanding, and is a poor historian. At present, she is not complaining of any pain. He reports she has history of a prior right sided wrist/arm fracture roughly 20 years ago. At present, patient denies all complaints. Orthopedics has been engaged for findings of right sided pelvic fractures, and a questionable right wrist fracture. Review Of Systems:   Unable to adequately assess with patients current mental acuity. Past Medical History:   Past Medical History:   Diagnosis Date    Altered mental status 11/01/2023    Constipation 11/01/2023    Dementia ([address])     Hypertension        Past Surgical History:   Past Surgical History:   Procedure Laterality Date    COLONOSCOPY      DILATION AND CURETTAGE OF UTERUS      KS ANTERIOR COLPORRAPHY RPR CYSTOCELE W/CYSTO N/A 1/21/2020    Procedure: COLPORRHAPHY;  Surgeon: Latricia Wallis MD;  Location: [facility];  Service: Gynecology    KS VAGINAL HYSTERECTOMY UTERUS 250 GM/< N/A 1/21/2020    Procedure: HYSTERECTOMY VAGINAL TOTAL (TVH) left salpingectomy;  Surgeon: Latricia Wallis MD;  Location: [facility];  Service: Gynecology    WRIST FRACTURE SURGERY Right        Family History:  Family history reviewed and non-contributory  History reviewed. No pertinent family history.     Social History:  Social History     Socioeconomic History    Marital status: /Civil Union     Spouse name: None    Number of children: None    Years of education: None    Highest education level: None   Occupational History    None   Tobacco Use    Smoking status: Never    Smokeless tobacco: Never   Vaping Use    Vaping Use: Never used   Substance and Sexual Activity    Alcohol use: Not Currently    Drug use: Never    Sexual activity: Yes     Birth control/protection: Post-menopausal   Other Topics Concern    None   Social History Narrative    None     Social Determinants of Health     Financial Resource Strain: Not on file   Food Insecurity: Not on file   Transportation Needs: Not on file   Physical Activity: Not on file   Stress: Not on file   Social Connections: Not on file   Intimate Partner Violence: Not on file   Housing Stability: Not on file       Allergies:    Allergies   Allergen Reactions    Sulfa Antibiotics      Doesn't remember reaction           Labs:  0   Lab Value Date/Time    HCT 35.5 12/05/2023 0727    HCT 40.9 11/13/2023 0743    HCT 37.1 11/02/2023 0635    HGB 12.0 12/05/2023 0727    HGB 14.0 11/13/2023 0743    HGB 12.6 11/02/2023 0635    WBC 9.49 12/05/2023 0727    WBC 5.50 11/13/2023 0743    WBC 10.29 (H) 11/02/2023 0635       Meds:    Current Facility-Administered Medications:     enoxaparin (LOVENOX) subcutaneous injection 30 mg, 30 mg, Subcutaneous, Q12H, Andrea Longoria PA-C, 30 mg at 12/05/23 0946    potassium chloride 20 mEq IVPB (premix), 20 mEq, Intravenous, Once, Vidal Lee MD, Last Rate: 50 mL/hr at 12/05/23 0912, 20 mEq at 12/05/23 4150    Current Outpatient Medications:     Ascorbic Acid (vitamin C) 100 MG tablet, Take 100 mg by mouth daily, Disp: , Rfl:     bisacodyl (DULCOLAX) 10 mg suppository, Insert 1 suppository (10 mg total) into the rectum daily as needed for constipation, Disp: 12 suppository, Rfl: 0    cholecalciferol (VITAMIN D3) 400 units tablet, Take 400 Units by mouth daily (Patient not taking: Reported on 10/31/2023), Disp: , Rfl:     felodipine (PLENDIL) 10 MG 24 hr tablet, Take 10 mg by mouth daily, Disp: , Rfl: 3    lisinopril-hydrochlorothiazide (PRINZIDE,ZESTORETIC) 20-25 MG per tablet, Take 1 tablet by mouth daily, Disp: , Rfl: 3    melatonin 3 mg, Take 1 tablet (3 mg total) by mouth 2 hours prior to bedtime. , Disp: 30 tablet, Rfl: 11    QUEtiapine (SEROquel) 25 mg tablet, START BY TAKING 12.5MG AT NIGHT, AS NEED YOU MAY ALSO TAKE 12.5MG IN THE MORNING AS NEEDED. (Patient taking differently: Take 25 mg by mouth daily at bedtime), Disp: 90 tablet, Rfl: 2    senna-docusate sodium (SENOKOT S) 8.6-50 mg per tablet, Take 1 tablet by mouth 2 (two) times a day for 15 days, Disp: 30 tablet, Rfl: 0    simvastatin (ZOCOR) 40 mg tablet, Take 40 mg by mouth daily (Patient not taking: Reported on 12/5/2023), Disp: , Rfl: 3    Blood Culture:   No results found for: "BLOODCX"    Wound Culture:   No results found for: "WOUNDCULT"    Ins and Outs:  No intake/output data recorded. Physical Exam:   /59 (BP Location: Right arm)   Pulse 80   Temp 98.7 °F (37.1 °C) (Rectal)   Resp 17   Ht 5' 5" (1.651 m)   Wt 56.4 kg (124 lb 5.4 oz)   LMP  (LMP Unknown)   SpO2 95%   BMI 20.69 kg/m²   Gen: No acute distress, resting comfortably in bed  HEENT: Eyes clear, moist mucus membranes, hearing intact  Respiratory: No audible wheezing or stridor  Cardiovascular: Well Perfused peripherally, 2+ distal pulse  Abdomen: nondistended, no peritoneal signs  Musculoskeletal: bilateral upper extremity  Skin intact. She has some bruising over the dorsum of the right wrist.   She has no significant ttp over the bilateral clavicles, shoulders, upper arms, elbows, forearms or wrists/hands. She does not express pain to palpation specifically over the right wrist. She does not complain of pain with flexion/extension of the wrist.   No pain with ROM of the bilateral upper arms. SILT m/r/u bilaterally. Motor intact ain/pin/m/r/u bilaterally. 2+ radial and ulnar pulse bilaterally. Musculature is soft and compressible, no pain with passive stretch bilaterally. Musculoskeletal: bilateral lower extremity  Skinintact.    No apparent ttp in the bilateral hips, femurs, upper legs, knees, lower legs, ankles, feet or toes  SILT s/s/sp/dp/t bilaterally. Motor intact 5/5 strength with hip flexion/extension, knee flexion/extension, ankle dorsi/plantar flexion, EHL/FHL bilaterally  2+ DP/PT pulse bilaterally. Musculature is soft and compressible, no pain with passive stretch bilaterally  Leg lengths equal    Tertiary: no tenderness over all other joints/long bones as except already stated. Radiology:   I personally reviewed the films. Imaging reviewed and discussed with Dr. Popeye Cardoso.   Onetha Cater right wrist: questionable non displaced fracture at the distal radial metaphysis. XRAY right femur: no acute osseous abnormality. CT c/a/p: right pubic bone and pubic root fracture.     _*_*_*_*_*_*_*_*_*_*_*_*_*_*_*_*_*_*_*_*_*_*_*_*_*_*_*_*_*_*_*_*_*_*_*_*_*_*_*_*_*    Assessment:  82 y. o.female s/p fall with right pubic rami/pubic root fracture, and questionable right distal radius fracture. Plan:   Weight bearing as tolerated to the right lower extremity. No operative intervention indicated at this time. Weight bearing as tolerated to the right upper extremity. Will apply universal wrist splint. This may be removed for passive range of motion with physical therapy. Otherwise should remain on at all times. PT/OT  Pain control per primary team.   DVT ppx per primary team.   Medical management per primary team.   Body mass index is 20.69 kg/m². Dispo: Ortho signing off  Case reviewed and discussed with Dr. Popeye Cardoso. Patient should follow up in office with Dr. Popeye Cardoso for re-evaluation in 4 weeks.      Melinda Olivera PA-C

## 2023-12-05 NOTE — ASSESSMENT & PLAN NOTE
BM today 12/07  History of constipation   Continue senna 2 tablets PO BID  Continue MiraLAX daily, noncompliant a this time

## 2023-12-05 NOTE — ASSESSMENT & PLAN NOTE
Pt. Is AAOx2 at baseline   Currently AAOx1  Pt.  Lives at home with    Dependent for instrumental ADLs  Dependent for ADLs  Nonviolent confusion behaviors at baseline  Hallucinations at baseline  TSH wnl  B12 resulted 302  Start vitamin B12 1000 mcg PO daily  reorient as needed  Monitor for behaviors   Monitor for mental status change  Provide supportive care   monitor EKG for Qtc  If behaviors occur, consider seroquel 12.5 mg PO 1400

## 2023-12-05 NOTE — ASSESSMENT & PLAN NOTE
-Patient noted to have increased disorientation and hallucinations per    -Patient is high risk of delirium due to dementia at baseline, recent trauma, hospital admission  -Initiate delirium precautions  -maintain normal sleep/wake cycle- continue melatonin 3 mg QHS  -minimize overnight interruptions, group overnight vitals/labs/nursing checks as possible  -dim lights, close blinds and turn off tv to minimize stimulation and encourage sleep environment in evenings  -ensure that pain is well controlled, continue Tylenol 975mg Q8H scheduled   -monitor for fecal and urinary retention which may precipitate delirium, check bladder scan daily  -encourage early mobilization and ambulation  -provide frequent reorientation and redirection  -encourage family and friends at the bedside to help calm patient if anxious  -avoid medications which may precipitate or worsen delirium such as tramadol, benzodiazepine, anticholinergics, and antihistaminics  -encourage hydration and nutrition , assist with feeding if needed  -redirect unwanted behaviors as first line, avoid physical restraints.   -Check TSH   -Check B12  -Start Gabapentin 100 mg PO TID 0900, 1400, 2100  - discontinue Seroquel 25 mg PO QHS

## 2023-12-05 NOTE — ASSESSMENT & PLAN NOTE
- Mildly displaced right acetabulum fracture, present on admission  - Status post fall on Saturday, 12/2. - Appreciate Orthopedic surgery evaluation, recommendations and interventions as noted. - Non operative management  - Maintain WBAT RLE   - Monitor right lower extremity neurovascular exam.  - Continue multimodal analgesic regimen.  - Continue DVT prophylaxis. - PT and OT evaluation and treatment as indicated. - Outpatient follow up with Orthopedic surgery for re-evaluation.

## 2023-12-05 NOTE — ED PROVIDER NOTES
History  Chief Complaint   Patient presents with    Altered Mental Status     Per  pt has been "delusional" for 2 days. States that she has been seeing things that aren't there. Also fell last night, denies head injury.  states she had similar symptoms last time she had UTI. Also reprots constipation     Patient is an 31-year-old female, with a history significant for dementia, who presents to the ED today due to a 2-day history of decreased orientation and increasing visual hallucinations/confusion. Patient has also not been ambulatory for the last 1 to 2 days. Notably, had an unwitnessed fall on Saturday. Patient's , present in room and providing history, also states that patient has not had a bowel movement since Saturday and he also states that she has not passing flatus. Patient's  states she has been having no fevers recently and no rashes have been noted. Subjective history from patient limited due to dementia. Prior to Admission Medications   Prescriptions Last Dose Informant Patient Reported? Taking? Ascorbic Acid (vitamin C) 100 MG tablet   Yes No   Sig: Take 100 mg by mouth daily   QUEtiapine (SEROquel) 25 mg tablet   No No   Sig: START BY TAKING 12.5MG AT NIGHT, AS NEED YOU MAY ALSO TAKE 12.5MG IN THE MORNING AS NEEDED. Patient taking differently: Take 25 mg by mouth daily at bedtime   bisacodyl (DULCOLAX) 10 mg suppository   No No   Sig: Insert 1 suppository (10 mg total) into the rectum daily as needed for constipation   cholecalciferol (VITAMIN D3) 400 units tablet   Yes No   Sig: Take 400 Units by mouth daily   Patient not taking: Reported on 10/31/2023   felodipine (PLENDIL) 10 MG 24 hr tablet   Yes No   Sig: Take 10 mg by mouth daily   lisinopril-hydrochlorothiazide (PRINZIDE,ZESTORETIC) 20-25 MG per tablet   Yes No   Sig: Take 1 tablet by mouth daily   melatonin 3 mg   No No   Sig: Take 1 tablet (3 mg total) by mouth 2 hours prior to bedtime. senna-docusate sodium (SENOKOT S) 8.6-50 mg per tablet   No No   Sig: Take 1 tablet by mouth 2 (two) times a day for 15 days   simvastatin (ZOCOR) 40 mg tablet Not Taking  Yes No   Sig: Take 40 mg by mouth daily   Patient not taking: Reported on 12/5/2023      Facility-Administered Medications: None       Past Medical History:   Diagnosis Date    Altered mental status 11/01/2023    Constipation 11/01/2023    Dementia ([address])     Hypertension        Past Surgical History:   Procedure Laterality Date    COLONOSCOPY      DILATION AND CURETTAGE OF UTERUS      RI ANTERIOR COLPORRAPHY RPR CYSTOCELE W/CYSTO N/A 1/21/2020    Procedure: COLPORRHAPHY;  Surgeon: Cahrly Poe MD;  Location: [facility];  Service: Gynecology    RI VAGINAL HYSTERECTOMY UTERUS 250 GM/< N/A 1/21/2020    Procedure: HYSTERECTOMY VAGINAL TOTAL (TVH) left salpingectomy;  Surgeon: Charly Poe MD;  Location: [facility];  Service: Gynecology    WRIST FRACTURE SURGERY Right        History reviewed. No pertinent family history. I have reviewed and agree with the history as documented. E-Cigarette/Vaping    E-Cigarette Use Never User      E-Cigarette/Vaping Substances     Social History     Tobacco Use    Smoking status: Never    Smokeless tobacco: Never   Vaping Use    Vaping Use: Never used   Substance Use Topics    Alcohol use: Not Currently    Drug use: Never       Review of Systems   Unable to perform ROS: Dementia   Constitutional:  Negative for fever. Gastrointestinal:  Positive for constipation. Negative for blood in stool. Skin:  Positive for color change. Allergic/Immunologic: Positive for environmental allergies. Psychiatric/Behavioral:  Positive for confusion. Physical Exam  Physical Exam  Vitals and nursing note reviewed. Exam conducted with a chaperone present. Constitutional:       General: She is not in acute distress. Appearance: She is not toxic-appearing or diaphoretic.    HENT:      Head: Normocephalic and atraumatic. Right Ear: External ear normal.      Left Ear: External ear normal.      Nose: Nose normal. No congestion or rhinorrhea. Mouth/Throat:      Mouth: Mucous membranes are dry. Eyes:      General: No scleral icterus. Right eye: No discharge. Left eye: No discharge. Pupils: Pupils are equal, round, and reactive to light. Cardiovascular:      Rate and Rhythm: Normal rate and regular rhythm. Pulses: Normal pulses. Heart sounds: Normal heart sounds. No murmur heard. No friction rub. No gallop. Pulmonary:      Effort: Pulmonary effort is normal. No respiratory distress. Breath sounds: Normal breath sounds. No stridor. No wheezing, rhonchi or rales. Abdominal:      General: Abdomen is flat. There is no distension. Palpations: Abdomen is soft. Tenderness: There is no abdominal tenderness. There is no right CVA tenderness, left CVA tenderness, guarding or rebound. Comments: No apparent discomfort with palpation of the abdomen   Genitourinary:     General: Normal vulva. Rectum: Normal.   Musculoskeletal:         General: Swelling present. No deformity. Cervical back: No tenderness. Comments: Bruising and swelling over the right wrist.  Bruising over the right hip/extending slightly to the thigh    No apparent tenderness to palpation of the bilateral shoulders, elbows, arms, thighs, knees, legs. No apparent chest wall or pelvic tenderness to palpation   No apparent midline C, T, L spine tenderness to palpation      Soft compartments   Lymphadenopathy:      Cervical: No cervical adenopathy. Skin:     General: Skin is warm and dry. Capillary Refill: Capillary refill takes less than 2 seconds. Findings: Bruising present. Neurological:      Mental Status: She is alert. Comments: PERRL. Patient speaking in non sequitur's/confused speech. Patient moving all 4 extremities actively.  Further evaluation of this complaint with exam limited due to dementia.     Psychiatric:      Comments: Evaluation of this component of the exam currently limited due to dementia         Vital Signs  ED Triage Vitals [12/05/23 0701]   Temperature Pulse Respirations Blood Pressure SpO2   98.7 °F (37.1 °C) 80 17 130/59 95 %      Temp Source Heart Rate Source Patient Position - Orthostatic VS BP Location FiO2 (%)   Oral Monitor Lying Right arm --      Pain Score       --           Vitals:    12/05/23 0701   BP: 130/59   Pulse: 80   Patient Position - Orthostatic VS: Lying         Visual Acuity      ED Medications  Medications   potassium chloride 20 mEq IVPB (premix) (20 mEq Intravenous New Bag 12/5/23 0912)   enoxaparin (LOVENOX) subcutaneous injection 30 mg (has no administration in time range)   sodium chloride 0.9 % bolus 500 mL (500 mL Intravenous New Bag 12/5/23 0734)   iohexol (OMNIPAQUE) 350 MG/ML injection (MULTI-DOSE) 85 mL (85 mL Intravenous Given 12/5/23 0814)       Diagnostic Studies  Results Reviewed       Procedure Component Value Units Date/Time    HS Troponin I 4hr [[id number]]     Lab Status: No result Specimen: Blood     CBC and differential [[id number]]  (Abnormal) Collected: 12/05/23 0727    Lab Status: Final result Specimen: Blood from Arm, Right Updated: 12/05/23 0803     WBC 9.49 Thousand/uL      RBC 3.83 Million/uL      Hemoglobin 12.0 g/dL      Hematocrit 35.5 %      MCV 93 fL      MCH 31.3 pg      MCHC 33.8 g/dL      RDW 13.1 %      MPV 10.8 fL      Platelets 98 Thousands/uL      nRBC 0 /100 WBCs      Neutrophils Relative 81 %      Immat GRANS % 1 %      Lymphocytes Relative 9 %      Monocytes Relative 9 %      Eosinophils Relative 0 %      Basophils Relative 0 %      Neutrophils Absolute 7.79 Thousands/µL      Immature Grans Absolute 0.06 Thousand/uL      Lymphocytes Absolute 0.81 Thousands/µL      Monocytes Absolute 0.81 Thousand/µL      Eosinophils Absolute 0.01 Thousand/µL      Basophils Absolute 0.01 Thousands/µL Narrative: This is an appended report. These results have been appended to a previously verified report.     Smear Review(Phlebs Do Not Order) [[id number]]  (Abnormal) Collected: 12/05/23 0727    Lab Status: Final result Specimen: Blood from Arm, Right Updated: 12/05/23 0803     RBC Morphology Normal     Platelet Estimate Borderline    HS Troponin I 2hr [[id number]]     Lab Status: No result Specimen: Blood     HS Troponin 0hr (reflex protocol) [[id number]]  (Normal) Collected: 12/05/23 0727    Lab Status: Final result Specimen: Blood from Arm, Right Updated: 12/05/23 0800     hs TnI 0hr 32 ng/L     UA w Reflex to Microscopic w Reflex to Culture [[id number]]  (Abnormal) Collected: 12/05/23 0744    Lab Status: Final result Specimen: Urine, Straight Cath Updated: 12/05/23 0759     Color, UA Light Yellow     Clarity, UA Clear     Specific Gravity, UA 1.020     pH, UA 6.5     Leukocytes, UA Negative     Nitrite, UA Negative     Protein, UA Negative mg/dl      Glucose, UA Negative mg/dl      Ketones, UA 10 (1+) mg/dl      Urobilinogen, UA <2.0 mg/dl      Bilirubin, UA Negative     Occult Blood, UA Negative    Comprehensive metabolic panel [[id number]]  (Abnormal) Collected: 12/05/23 0727    Lab Status: Final result Specimen: Blood from Arm, Right Updated: 12/05/23 0754     Sodium 140 mmol/L      Potassium 3.4 mmol/L      Chloride 103 mmol/L      CO2 30 mmol/L      ANION GAP 7 mmol/L      BUN 30 mg/dL      Creatinine 0.81 mg/dL      Glucose 119 mg/dL      Calcium 9.3 mg/dL      AST 24 U/L      ALT 17 U/L      Alkaline Phosphatase 42 U/L      Total Protein 6.2 g/dL      Albumin 4.1 g/dL      Total Bilirubin 1.05 mg/dL      eGFR 68 ml/min/1.73sq m     Narrative:      [place] guidelines for Chronic Kidney Disease (CKD):     Stage 1 with normal or high GFR (GFR > 90 mL/min/1.73 square meters)    Stage 2 Mild CKD (GFR = 60-89 mL/min/1.73 square meters)    Stage 3A Moderate CKD (GFR = 45-59 mL/min/1.73 square meters)    Stage 3B Moderate CKD (GFR = 30-44 mL/min/1.73 square meters)    Stage 4 Severe CKD (GFR = 15-29 mL/min/1.73 square meters)    Stage 5 End Stage CKD (GFR <15 mL/min/1.73 square meters)  Note: GFR calculation is accurate only with a steady state creatinine    CK [[id number]]  (Abnormal) Collected: 12/05/23 0727    Lab Status: Final result Specimen: Blood from Arm, Right Updated: 12/05/23 0754     Total  U/L                    XR femur 2 views RIGHT   ED Interpretation by Shabana Lopez MD (12/05 7861)   Per my independent interpretation: No acute osseous abnormality      Final Result by Mariajose Sam MD (12/05 5107)      No acute osseous abnormality. Workstation performed: [id number]         XR wrist 3+ views RIGHT   Final Result by Mariajose Sam MD (12/05 1053)      Possible nondisplaced fracture at the distal radial metaphysis. Correlate with site of pain. The study was marked in [facility] [place] for immediate notification. Workstation performed: [id number]         XR chest 1 view portable   ED Interpretation by Shabana Lopez MD (42/91 6980)   Per my independent interpretation: No acute cardiopulmonary process      Final Result by Mariajose Sam MD (12/05 7341)      No active pulmonary disease. Workstation performed: [id number]         CT head without contrast   Final Result by Mary Davis MD (64/46 1233)      No acute intracranial abnormality. Chronic findings, as above. Workstation performed: [id number]         CT spine cervical without contrast   Final Result by Mary Davis MD (12/05 3433)      No cervical spine fracture or traumatic malalignment. Workstation performed: [id number]         CT abdomen pelvis with contrast   Final Result by Mary Davis MD (08/22 7641)      Right pubic bone and acetabular fractures. No acute intra-abdominal pathology.    Fecal retention in the redundant colon, mild. Indeterminate hypodense 1.3 cm right hepatic lobe lesion, poorly marginated. Recommend further characterization with nonemergent, possibly outpatient MRI with and without contrast.   Other findings, as per the body of the report. Workstation performed: [id number]                    Procedures  Procedures         ED Course  ED Course as of 12/05/23 0946   Tue Dec 05, 2023   0808 WBC: 9.49  WNL   0808 Total CK(!): 471  Slight elevation   0808 Creatinine: 0.81  WNL   0851 ECG per my independent interpretation: Normal rate, regular rhythm, no ectopy, left axis, no ST elevations, artifact present   0907 I discussed case with Dr. Herman Norman from trauma. Patient will be admitted. Ortho consulted                                              Medical Decision Making  Patient is an 42-year-old female, with a history significant for dementia, who presents to the ED today due to a 2-day history of decreased orientation and increasing visual hallucinations/confusion. Patient has also not been ambulatory for the last 1 to 2 days. Notably, had an unwitnessed fall on Saturday. Patient's , present in room and providing history, also states that patient has not had a bowel movement since Saturday and he also states that she has not passing flatus. Patient's  states she has been having no fevers recently and no rashes have been noted. Patient is currently afebrile and hemodynamically stable. Her physical exam is notable for bruising over the right wrist and right hip/thigh. Complete physical exam limited due to patient mental status. Heart lungs clear to auscultation. No apparent discomfort with palpation throughout the body, even the bruised areas. Soft compartments. This presentation is concerning for: Fall, fracture, sprain, strain, contusion, ACS, rhabdo, ICH, bowel obstruction. Patient at risk for colitis, UTI, pneumonia.   Will investigate with cardiac workup, CK, UA, CT head, CT C-spine, CT chest on pelvis, x-ray femur, x-ray wrist.  Will manage based upon workup. Amount and/or Complexity of Data Reviewed  Labs: ordered. Decision-making details documented in ED Course. Radiology: ordered and independent interpretation performed. Risk  Prescription drug management. Decision regarding hospitalization. Disposition  Final diagnoses:   Fall, initial encounter   Hypokalemia   Acetabular fracture ([address])   Liver lesion   Distal radius fracture     Time reflects when diagnosis was documented in both MDM as applicable and the Disposition within this note       Time User Action Codes Description Comment    12/5/2023  9:01 AM Jessica Sweet A Add [K61. GJRP] Fall, initial encounter     12/5/2023  9:02 AM Jessica Sweet A Add [E87.6] Hypokalemia     12/5/2023  9:02 AM Mcao Foley A Add [S32.409A] Acetabular fracture ([address])     12/5/2023  9:02 AM Jessica Sweet Modify [R11. LRKE] Fall, initial encounter     12/5/2023  9:02 AM Jessica Sweet A Modify [S32.409A] Acetabular fracture ([address])     12/5/2023  9:04 AM Jessica Ke A Add [K76.9] Liver lesion     12/5/2023  9:09 AM Maco Foley A Add [S52.509A] Distal radius fracture     12/5/2023  9:26 AM Lukievics, Jacqualyn Boeck Add [G30.9,  F02.818] Alzheimer's dementia with behavioral disturbance [facility])           ED Disposition       ED Disposition   Admit    Condition   Stable    Date/Time   Tue Dec 5, 2023  9:12 AM    Comment   Case was discussed with Trauma              Follow-up Information    None         Patient's Medications   Discharge Prescriptions    No medications on file       No discharge procedures on file.     PDMP Review       None            ED Provider  Electronically Signed by             Ree Pierce MD  12/05/23 4443

## 2023-12-05 NOTE — ASSESSMENT & PLAN NOTE
Mildly displaced right acetabulum fracture, present on ED admission 12/05  Patient denies pain at this time  Manage per orthopedics - non-operative at this time  Monitor for urinary retention

## 2023-12-05 NOTE — CONSULTS
Consultation - Geriatric Medicine   Yaima Bay 82 y.o. female MRN: [id number]  Unit/Bed#: ED-37 Encounter: [id number]      Assessment/Plan     Weight loss  Assessment & Plan   reports recent weight loss  Encourage adequate PO intake  Start PO protein supplementation with every meal    [address]  Patient uses no AD for ambulation at baseline   reports recent unwitnessed fall 12/03  Monitor orthostatic vital signs  Check Vitamin D3  Start Vitamin D3 2000 units PO daily  Encourage p.o. hydration  Avoid hypotension and hypoglycemia   [place] fall precautions   Assess patient frequently for physical needs, encourage use of assistant devices as needed and directed by PT/OT  Identify cognitive and physical deficits and behaviors that affect risk of falls  Consider moving patient closer to nursing station to monitor more closely for impulsive behavior which may increase risk of falls  Educate patient/family on patient safety including physical limitations and importance of using call bell for assistance   Modify environment to reduce risk of injury including disconnecting from pole when not in use, ensuring adequate lighting in room and restroom, ensuring that path to restroom is clear and free of trip hazards  PT/OT consulted to assist with strengthening/mobility and assist with discharge planning to appropriate level of care     Acute encephalopathy  Assessment & Plan  -Patient noted to have increased disorientation and hallucinations per    -Patient is high risk of delirium due to dementia at baseline, recent trauma, hospital admission  -Initiate delirium precautions  -maintain normal sleep/wake cycle- continue melatonin 3 mg QHS  -minimize overnight interruptions, group overnight vitals/labs/nursing checks as possible  -dim lights, close blinds and turn off tv to minimize stimulation and encourage sleep environment in evenings  -ensure that pain is well controlled, continue Tylenol 975mg Q8H scheduled   -monitor for fecal and urinary retention which may precipitate delirium, check bladder scan daily  -encourage early mobilization and ambulation  -provide frequent reorientation and redirection  -encourage family and friends at the bedside to help calm patient if anxious  -avoid medications which may precipitate or worsen delirium such as tramadol, benzodiazepine, anticholinergics, and antihistaminics  -encourage hydration and nutrition , assist with feeding if needed  -redirect unwanted behaviors as first line, avoid physical restraints.   -Check TSH   -Check B12  -Start Gabapentin 100 mg PO TID 0900, 1400, 2100  - discontinue Seroquel 25 mg PO QHS      Edema of left lower extremity  Assessment & Plan  Edema of LLE noted 12/05  Associated ecchymosis of LLE  Consider in the context of fall   Continue to monitor  Recommend b/l LE doppler    Elevated creatine kinase  Assessment & Plan  CK today 12/05 is 471   Encourage fluids  Continue to monitor   Recheck CK level tomorrow    Sundowning  Assessment & Plan   confirms sundowning daily at 4-5 pm   Increased disorientation and hallucinations  No history of violent behaviors noted  Monitor for behaviors  Reorient as needed   Continue melatonin 3 mg PO QHS  Start Gabapentin 100 mg TID , 0900, 1400, 2100  Discontinue Seroquel 25 mg PO QHS- Qtc 501 today- monitor EKG    Thrombocytopenia (HCC)  Assessment & Plan  Platelets 98 today 89/20  Historically noted as chronic   Continue to monitor    Constipation  Assessment & Plan  No reported BM since 12/02  History of constipation   Start senna 2 tablets PO BID  Start MiraLAX daily    Alzheimer's dementia with behavioral disturbance ([address])  Assessment & Plan  Pt. Is AAOx2 at baseline   Currently AAOx1  Pt.  Lives at home with    Dependent for instrumental ADLs  Dependent for ADLs  Nonviolent confusion behaviors at baseline  Hallucinations at baseline  Check TSH  Check B12   reorient as needed  Monitor for behaviors   Monitor for mental status change  Provide supportive care     * Acetabulum fracture, right [facility])  Assessment & Plan  Mildly displaced right acetabulum fracture, present on ED admission   Patient denies pain at this time  Manage per orthopedics - non-operative at this time  Monitor for urinary retention              History of Present Illness   Physician Requesting Consult: Davdi Fontanez DO  Reason for Consult / Principal Problem: Fall, Dementia  Hx and PE limited by: Dementia  Additional history obtained from:  in-room      HPI: Willa Varela is a 82y.o. year old female with dementia at baseline who presents for geriatric consult after ED admission today  for a fall. ED course involved presentation for a fall that occurred . Pt. Noted to be laying in bed since the fall and was brought to ED for evaluation. She denied pain at the time of ED evaluation. Imagining revealed Right pubic bone and acetabular fractures, possible nondisplaced fracture at the distal radial metaphysis, no intracranial abnormality, no c-spine findings. Orthopedics consulted, manage non-operative at this time. Pt. Was seen and evaluated at bedside for consult and is currently laying in bed in no signs of acute distress. AAOx1, oriented to self: name and  only. She denies pain at this time but is noted to be disoriented.  present in-room to provide additional historical information:    He states that before her fall on , she had increasing confusion, disorientation, and visual hallucinations due to an unknown etiology.  confirms nonviolent behaviors and visual hallucinations at baseline. Pt. Is AAOx2 at baseline, oriented to self and location only. He states that she suffered an unwitnessed fall  and had been laying in bed more often than normal and with continuing confusion leading to presentation for ED evaluation two days later today . At Pt.'s baseline, they ambulate without AD. They have had 0 falls in the last six months, this is her first fall. Uses glasses for vision assistance. She is continent of bladder but incontinent of bowel at baseline. Appetite is good at baseline, recent weight loss noted per . No insomnia at baseline. Medication management, chores, cleaning, and finances, and performed by  at baseline. Pt. not driving at baseline. Sundowning confirmed at baseline 4-5 pm, increased disorientation until she falls asleep. Inpatient consult to Gerontology  Consult performed by: Besty Olson  Consult ordered by: Jonathan Morales PA-C          Review of Systems   Reason unable to perform ROS: Dementia. Historical Information   Past Medical History:   Diagnosis Date    Altered mental status 11/01/2023    Constipation 11/01/2023    Dementia ([address])     Hypertension      Past Surgical History:   Procedure Laterality Date    COLONOSCOPY      DILATION AND CURETTAGE OF UTERUS      AL ANTERIOR COLPORRAPHY RPR CYSTOCELE W/CYSTO N/A 1/21/2020    Procedure: COLPORRHAPHY;  Surgeon: Nate Berger MD;  [facility]: [facility];  Service: Gynecology    AL VAGINAL HYSTERECTOMY UTERUS 250 GM/< N/A 1/21/2020    Procedure: HYSTERECTOMY VAGINAL TOTAL (TVH) left salpingectomy;  Surgeon: Nate Berger MD;  Location: [facility];  Service: Gynecology    WRIST FRACTURE SURGERY Right      Social History   Social History     Substance and Sexual Activity   Alcohol Use Not Currently     Social History     Substance and Sexual Activity   Drug Use Never     Social History     Tobacco Use   Smoking Status Never   Smokeless Tobacco Never     Family History: History reviewed. No pertinent family history.     Meds/Allergies   current meds:   Current Facility-Administered Medications   Medication Dose Route Frequency    acetaminophen (TYLENOL) tablet 975 mg  975 mg Oral Q8H [address]    ascorbic acid (VITAMIN C) tablet 250 mg  250 mg Oral Daily    [START ON 12/6/2023] cholecalciferol (VITAMIN D3) tablet 2,000 Units  2,000 Units Oral Daily    enoxaparin (LOVENOX) subcutaneous injection 30 mg  30 mg Subcutaneous Q12H    felodipine (PLENDIL) 24 hr tablet 10 mg  10 mg Oral Daily    gabapentin (NEURONTIN) capsule 100 mg  100 mg Oral TID    lisinopril (ZESTRIL) tablet 20 mg  20 mg Oral Daily    And    hydrochlorothiazide (HYDRODIURIL) tablet 25 mg  25 mg Oral Daily    melatonin tablet 3 mg  3 mg Oral HS    polyethylene glycol (MIRALAX) packet 17 g  17 g Oral Daily    senna-docusate sodium (SENOKOT S) 8.6-50 mg per tablet 1 tablet  1 tablet Oral BID   Home Medications:  Seroquel 25 mg PO QHS  Lisinopril-HCTZ 20-25 mg PO daily  Felodipine 10 mg PO daily  Vitamin D3 400 units PO daily   Vitamin C 100 mg PO daily   Dulcolax 10 mg suppository PRN   Senna 1 tablet BID for 90 days 22 Nov)  Melatonin 3 mg PO QHS      Allergies   Allergen Reactions    Sulfa Antibiotics      Doesn't remember reaction       Objective     Intake/Output Summary (Last 24 hours) at 12/5/2023 1308  Last data filed at 12/5/2023 1229  Gross per 24 hour   Intake 600 ml   Output --   Net 600 ml     Invasive Devices       Peripheral Intravenous Line  Duration             Peripheral IV 12/05/23 Right Antecubital <1 day                    Physical Exam  Vitals and nursing note reviewed. Constitutional:       General: She is not in acute distress. Appearance: She is underweight. HENT:      Head: Normocephalic and atraumatic. Mouth/Throat:      Mouth: Mucous membranes are dry. Eyes:      Conjunctiva/sclera: Conjunctivae normal.   Cardiovascular:      Rate and Rhythm: Regular rhythm. Tachycardia present. Pulses: Normal pulses. Heart sounds: Murmur heard. Pulmonary:      Effort: Pulmonary effort is normal. No respiratory distress. Breath sounds: Normal breath sounds. Abdominal:      Palpations: Abdomen is soft. Tenderness: There is no abdominal tenderness. Musculoskeletal:         General: No swelling. Cervical back: Neck supple. Right lower leg: No edema. Left lower leg: Edema present. Comments: Rt. Forearm brace in place per orthopedics   Skin:     General: Skin is warm and dry. Findings: Bruising (LLE over the tibia) present. Neurological:      Mental Status: She is alert. She is disoriented. Comments: AAOx1   Psychiatric:         Mood and Affect: Mood normal.         Lab Results:   I have personally reviewed pertinent lab results including the following:  -    I have personally reviewed the following imaging study reports in PACS:  -CT head, CT abd/pel, CT spine, XR femur, XR wrist      Therapies:   PT: pending  OT: pending    VTE Prophylaxis: Enoxaparin (Lovenox)    Code Status: Level 3 - DNAR and DNI  Advance Directive and Living Will:      Power of :    POLST:      Family and Social Support:   No data recorded    Scribed by DEBORAH Bauman. Note reviewed and history/physical exam taken with and completed by Roz Laws MD.      Thank you for allowing me to participate in your patients' care. Please do not hesitate to call with any additional questions.   Roz Laws MD

## 2023-12-05 NOTE — ASSESSMENT & PLAN NOTE
confirms sundowning daily at 4-5 pm   Increased disorientation and hallucinations  No history of violent behaviors noted  Monitor for behaviors  Reorient as needed   Continue melatonin 3 mg PO QHS  Continue Gabapentin 100 mg TID , 0900, 1400, 2100  monitor EKG for Qtc  If behaviors occur, consider seroquel 12.5 mg PO 1400

## 2023-12-05 NOTE — ASSESSMENT & PLAN NOTE
reports recent weight loss  Encourage adequate PO intake  Continue PO protein supplementation with every meal

## 2023-12-05 NOTE — ASSESSMENT & PLAN NOTE
Patient uses no AD for ambulation at baseline   reports recent unwitnessed fall 12/03  Monitor orthostatic vital signs  Vitamin D3 54.7, Continue Vitamin D3 supplements  Encourage p.o. hydration  Avoid hypotension and hypoglycemia   [place] fall precautions   Assess patient frequently for physical needs, encourage use of assistant devices as needed and directed by PT/OT  Identify cognitive and physical deficits and behaviors that affect risk of falls  Consider moving patient closer to nursing station to monitor more closely for impulsive behavior which may increase risk of falls  Educate patient/family on patient safety including physical limitations and importance of using call bell for assistance   Modify environment to reduce risk of injury including disconnecting from pole when not in use, ensuring adequate lighting in room and restroom, ensuring that path to restroom is clear and free of trip hazards  PT/OT consulted to assist with strengthening/mobility and assist with discharge planning to appropriate level of care

## 2023-12-05 NOTE — DISCHARGE INSTR - AVS FIRST PAGE
Discharge Instructions - Orthopedics  Renetta Kapoor 82 y.o. female MRN: [id number]  Unit/Bed#: AN XRAY    Weight Bearing Status:                                           Weight bearing as tolerated to the right lower extremity  Weight bearing as tolerated to the right upper extremity in wrist brace. Wrist brace may be removed to do passive range of motion with physical therapy, otherwise should be on at all times. Pain:  Continue analgesics as directed    Dressing Instructions:   Please keep clean, dry and intact until follow up     Appt Instructions: If you do not have your appointment, please call the clinic at [phone] to schedule with Dr. Matthew Romero in 4 weeks. Otherwise follow up as scheduled. Contact the office sooner if you experience any increased numbness/tingling in the extremities.

## 2023-12-05 NOTE — CASE MANAGEMENT
Case Management Assessment    Patient name Zoila Stewart  Location ED-37/ED-37 MRN [id number]  : 1940 Date 2023       Current Admission Date: 2023  Current Admission Diagnosis:Acetabulum fracture, right [facility])   Patient Active Problem List    Diagnosis Date Noted    Elevated creatine kinase 2023    Acetabulum fracture, right ([address]) 2023    Edema of left lower extremity 2023    Acute encephalopathy 2023    Fall 2023    Weight loss 2023    Urinary retention 2023    Wrist fracture, closed, right, initial encounter 2023    Sundowning 2023    Hypertension 2023    Constipation 2023    Thrombocytopenia ([address]) 2023    Hypokalemia 2023    Right renal mass 2023    Alzheimer's dementia with behavioral disturbance ([address]) 10/31/2023    Status post vaginal hysterectomy, left salpingectomy, anterior repair 2020    Second degree uterine prolapse 2019    [place] grade 2 cystocele 2019      LOS (days): 0  Geometric Mean LOS (GMLOS) (days):   Days to GMLOS:     OBJECTIVE:    Risk of Unplanned Readmission Score: 16.12         Current admission status: Inpatient       Preferred Pharmacy:   [id number] [address] [facility], [address] [facility]. [address] [place] [address]  Phone: [phone] Fax: [phone]    Primary Care Provider: Edith Linton MD    Primary Insurance: [facility] [facility]  Secondary Insurance:     ASSESSMENT:  Brownrt Proxies    There are no active Health Care Proxies on file.        Readmission Root Cause  30 Day Readmission: No    Patient Information  Admitted from[de-identified] Home  Mental Status: Confused  During Assessment patient was accompanied by: Spouse  Assessment information provided by[de-identified] Spouse  Primary Caregiver: Spouse  Caregiver's Name[de-identified] Ronn Gooden (Spouse)  Caregiver's Relationship to Patient[de-identified] Significant Other  Caregiver's Telephone Number[de-identified] [phone]  Support Systems: Spouse/significant other  What city do you live in?: [address] entry access options. Select all that apply.: Stairs  Number of steps to enter home.: 2  Do the steps have railings?: No  Type of Current Residence: [facility]  Living Arrangements: Lives w/ Spouse/significant other    Activities of Daily Living Prior to Admission  Functional Status: Assistance  Completes ADLs independently?: No  Level of ADL dependence: Assistance  Ambulates independently?: No  Level of ambulatory dependence: Assistance  Does patient use assisted devices?: Yes  Assisted Devices (DME) used: Straight [employer] Gal  Does patient currently own DME?: No  Does patient have a history of Outpatient Therapy (PT/OT)?: No  Does the patient have a history of Short-Term Rehab?: Yes  Does patient have a history of HHC?: No  Does patient currently have [address]?: No         Patient Information Continued  Income Source: Pension/FPC  Does patient have prescription coverage?: Yes  Does patient receive dialysis treatments?: No  Does patient have a history of substance abuse?: No  Does patient have a history of Mental Health Diagnosis?: Yes  Is patient receiving treatment for mental health?: Yes (Dementia)  Has patient received inpatient treatment related to mental health in the last 2 years?: No    PHQ 2/9 Screening   Reviewed PHQ 2/9 Depression Screening Score?: No    Means of Transportation  Means of Transport to Appts[de-identified] Family transport      Housing Stability: Not on file   Food Insecurity: Not on file   Transportation Needs: Not on file   Utilities: Not on file     CM spoke with patient's spouse Lauren Troy for assessment information. Per Tarun patient is a 1 person assist prior to this incident. He is open to SNF for rehab pending patient stability. Per Lauren Troy, he will prefer [facility] for SNF. CM team available to follow up.

## 2023-12-06 PROBLEM — G93.40 ACUTE ENCEPHALOPATHY: Status: RESOLVED | Noted: 2023-12-05 | Resolved: 2023-12-06

## 2023-12-06 LAB
ANION GAP SERPL CALCULATED.3IONS-SCNC: 8 MMOL/L
ATRIAL RATE: 234 BPM
ATRIAL RATE: 72 BPM
BASOPHILS # BLD AUTO: 0.03 THOUSANDS/ÂΜL (ref 0–0.1)
BASOPHILS NFR BLD AUTO: 0 % (ref 0–1)
BUN SERPL-MCNC: 27 MG/DL (ref 5–25)
CALCIUM SERPL-MCNC: 9.4 MG/DL (ref 8.4–10.2)
CHLORIDE SERPL-SCNC: 103 MMOL/L (ref 96–108)
CO2 SERPL-SCNC: 28 MMOL/L (ref 21–32)
CREAT SERPL-MCNC: 0.8 MG/DL (ref 0.6–1.3)
EOSINOPHIL # BLD AUTO: 0.08 THOUSAND/ÂΜL (ref 0–0.61)
EOSINOPHIL NFR BLD AUTO: 1 % (ref 0–6)
ERYTHROCYTE [DISTWIDTH] IN BLOOD BY AUTOMATED COUNT: 13.3 % (ref 11.6–15.1)
GFR SERPL CREATININE-BSD FRML MDRD: 69 ML/MIN/1.73SQ M
GLUCOSE SERPL-MCNC: 96 MG/DL (ref 65–140)
HCT VFR BLD AUTO: 35.7 % (ref 34.8–46.1)
HGB BLD-MCNC: 11.9 G/DL (ref 11.5–15.4)
IMM GRANULOCYTES # BLD AUTO: 0.02 THOUSAND/UL (ref 0–0.2)
IMM GRANULOCYTES NFR BLD AUTO: 0 % (ref 0–2)
LYMPHOCYTES # BLD AUTO: 1.88 THOUSANDS/ÂΜL (ref 0.6–4.47)
LYMPHOCYTES NFR BLD AUTO: 26 % (ref 14–44)
MCH RBC QN AUTO: 31.1 PG (ref 26.8–34.3)
MCHC RBC AUTO-ENTMCNC: 33.3 G/DL (ref 31.4–37.4)
MCV RBC AUTO: 93 FL (ref 82–98)
MONOCYTES # BLD AUTO: 0.7 THOUSAND/ÂΜL (ref 0.17–1.22)
MONOCYTES NFR BLD AUTO: 10 % (ref 4–12)
NEUTROPHILS # BLD AUTO: 4.58 THOUSANDS/ÂΜL (ref 1.85–7.62)
NEUTS SEG NFR BLD AUTO: 63 % (ref 43–75)
NRBC BLD AUTO-RTO: 0 /100 WBCS
P AXIS: -17 DEGREES
P AXIS: -24 DEGREES
PLATELET # BLD AUTO: 101 THOUSANDS/UL (ref 149–390)
PMV BLD AUTO: 11.5 FL (ref 8.9–12.7)
POTASSIUM SERPL-SCNC: 3.5 MMOL/L (ref 3.5–5.3)
PR INTERVAL: 152 MS
QRS AXIS: 100 DEGREES
QRS AXIS: 94 DEGREES
QRSD INTERVAL: 74 MS
QRSD INTERVAL: 78 MS
QT INTERVAL: 386 MS
QT INTERVAL: 416 MS
QTC INTERVAL: 425 MS
QTC INTERVAL: 455 MS
RBC # BLD AUTO: 3.83 MILLION/UL (ref 3.81–5.12)
SODIUM SERPL-SCNC: 139 MMOL/L (ref 135–147)
T WAVE AXIS: -11 DEGREES
T WAVE AXIS: -17 DEGREES
VENTRICULAR RATE: 72 BPM
VENTRICULAR RATE: 73 BPM
WBC # BLD AUTO: 7.29 THOUSAND/UL (ref 4.31–10.16)

## 2023-12-06 PROCEDURE — 97116 GAIT TRAINING THERAPY: CPT

## 2023-12-06 PROCEDURE — 85025 COMPLETE CBC W/AUTO DIFF WBC: CPT | Performed by: SURGERY

## 2023-12-06 PROCEDURE — 97535 SELF CARE MNGMENT TRAINING: CPT

## 2023-12-06 PROCEDURE — 93005 ELECTROCARDIOGRAM TRACING: CPT

## 2023-12-06 PROCEDURE — 99214 OFFICE O/P EST MOD 30 MIN: CPT | Performed by: FAMILY MEDICINE

## 2023-12-06 PROCEDURE — 99232 SBSQ HOSP IP/OBS MODERATE 35: CPT | Performed by: STUDENT IN AN ORGANIZED HEALTH CARE EDUCATION/TRAINING PROGRAM

## 2023-12-06 PROCEDURE — 97167 OT EVAL HIGH COMPLEX 60 MIN: CPT

## 2023-12-06 PROCEDURE — 82550 ASSAY OF CK (CPK): CPT | Performed by: PHYSICIAN ASSISTANT

## 2023-12-06 PROCEDURE — 80048 BASIC METABOLIC PNL TOTAL CA: CPT | Performed by: SURGERY

## 2023-12-06 PROCEDURE — 97163 PT EVAL HIGH COMPLEX 45 MIN: CPT

## 2023-12-06 RX ORDER — QUETIAPINE FUMARATE 25 MG/1
25 TABLET, FILM COATED ORAL
Status: DISCONTINUED | OUTPATIENT
Start: 2023-12-06 | End: 2023-12-08 | Stop reason: HOSPADM

## 2023-12-06 RX ORDER — MAGNESIUM CARB/ALUMINUM HYDROX 105-160MG
296 TABLET,CHEWABLE ORAL ONCE
Status: COMPLETED | OUTPATIENT
Start: 2023-12-06 | End: 2023-12-06

## 2023-12-06 RX ADMIN — ACETAMINOPHEN 975 MG: 325 TABLET, FILM COATED ORAL at 06:08

## 2023-12-06 RX ADMIN — GABAPENTIN 100 MG: 100 CAPSULE ORAL at 21:11

## 2023-12-06 RX ADMIN — ACETAMINOPHEN 975 MG: 325 TABLET, FILM COATED ORAL at 13:05

## 2023-12-06 RX ADMIN — Medication 3 MG: at 21:11

## 2023-12-06 RX ADMIN — SENNOSIDES AND DOCUSATE SODIUM 1 TABLET: 8.6; 5 TABLET ORAL at 17:43

## 2023-12-06 RX ADMIN — QUETIAPINE FUMARATE 25 MG: 25 TABLET ORAL at 21:11

## 2023-12-06 RX ADMIN — MAGNESIUM CITRATE 296 ML: 1.75 LIQUID ORAL at 13:00

## 2023-12-06 RX ADMIN — GABAPENTIN 100 MG: 100 CAPSULE ORAL at 13:05

## 2023-12-06 RX ADMIN — Medication 250 MG: at 08:28

## 2023-12-06 RX ADMIN — POLYETHYLENE GLYCOL 3350 17 G: 17 POWDER, FOR SOLUTION ORAL at 08:26

## 2023-12-06 RX ADMIN — ENOXAPARIN SODIUM 30 MG: 30 INJECTION SUBCUTANEOUS at 09:30

## 2023-12-06 RX ADMIN — ENOXAPARIN SODIUM 30 MG: 30 INJECTION SUBCUTANEOUS at 21:11

## 2023-12-06 RX ADMIN — ACETAMINOPHEN 975 MG: 325 TABLET, FILM COATED ORAL at 21:11

## 2023-12-06 RX ADMIN — SENNOSIDES AND DOCUSATE SODIUM 1 TABLET: 8.6; 5 TABLET ORAL at 08:27

## 2023-12-06 NOTE — PROGRESS NOTES
[address] [facility]  Progress Note  Name: Cornelius Huerta  MRN: [id number]  Unit/Bed#: W -01 I Date of Admission: 12/5/2023   Date of Service: 12/6/2023 I Hospital Day: 1    Assessment/Plan   Wrist fracture, closed, right, initial encounter  Assessment & Plan  - Possible right wrist fracture on XR with overlying ecchymosis, present on admission.  - Status post fall on 12/2. - Appreciate Orthopedic surgery evaluation, recommendations and interventions as noted. - Likely subacute/ chronic. Non operative management  - Maintain WBAT RUE  - Monitor right upper extremity neurovascular exam.  - Continue multimodal analgesic regimen.  - Continue DVT prophylaxis. - PT and OT evaluation and treatment as indicated. - Outpatient follow up with Orthopedic surgery for re-evaluation. Fall  Assessment & Plan  - Status post fall with the below noted injuries. - Fall precautions. - Geriatric Medicine consultation for evaluation, medication review and recommendations.  - PT and OT evaluation and treatment as indicated. - Case Management consultation for disposition planning. Constipation  Assessment & Plan  - Increase bowel regimen, last BM was Saturday 12/2    Alzheimer's dementia with behavioral disturbance ([address])  Assessment & Plan  - History of dementia  - Lives with  at independent living   - Takes Seroquel 25 mg QHS and overnight this medication was discontinued due to   - Recheck EKG today and plan to restart seroquel QHS   - Geriatric medicine consult    * Acetabulum fracture, right ([address])  Assessment & Plan  - Mildly displaced right acetabulum fracture, present on admission  - Status post fall on Saturday, 12/2. - Appreciate Orthopedic surgery evaluation, recommendations and interventions as noted.   - Non operative management  - Maintain WBAT RLE   - Monitor right lower extremity neurovascular exam.  - Continue multimodal analgesic regimen.  - Continue DVT prophylaxis. - PT and OT evaluation and treatment as indicated. - Outpatient follow up with Orthopedic surgery for re-evaluation. TRAUMA TERTIARY SURVEY NOTE    VTE Prophylaxis:Enoxaparin (Lovenox)     Disposition: Continue MS level of care, anticipate DC to rehab within 24-48 hrs. Code status:  Level 3 - DNAR and DNI    Consultants: IP CONSULT TO TRAUMA SURGERY  IP CONSULT TO ORTHOPEDIC SURGERY  IP CONSULT TO GERONTOLOGY  IP CONSULT TO CASE MANAGEMENT  IP CONSULT TO NUTRITION SERVICES    Subjective   Transfer from: none    Mechanism of Injury:Fall     Chief Complaint: "Better"    HPI/Last 24 hour events: Patient has history of dementia and is a poor historian, however she is sitting in a recliner chair and reports she is starting to feel better. Her  is at bedside and reports the patient ate her breakfast without nausea/ vomiting, but he is concerned that she hasn't had a BM since Saturday. Objective   Vitals:   Temp:  [94.8 °F (34.9 °C)-98.5 °F (36.9 °C)] 98.1 °F (36.7 °C)  HR:  [63-93] 74  Resp:  [16-17] 17  BP: (101-130)/(42-67) 104/49    I/O         12/04 0701  12/05 0700 12/05 0701  12/06 0700 12/06 0701  12/07 0700    IV Piggyback  600     Total Intake(mL/kg)  600 (10.6)     Urine (mL/kg/hr)  648 (0.5)     Total Output  648     Net  -48                     Physical Exam:   GENERAL APPEARANCE: Patient in no acute distress. HEENT: NCAT; PERRL, EOMs intact; Mucous membranes moist  CV: Regular rate and rhythm; no murmur/gallops/rubs appreciated. CHEST / LUNGS: Clear to auscultation; no wheezes/rales/rhonci. ABD: NABS; soft; non-distended; non-tender. : Voiding  EXT: RUE in removable splint; compartments soft; +2 pulses bilaterally upper & lower extremities; no edema. NEURO: GCS 14 due to disorientation at baseline; no focal neurologic deficits; neurovascularly intact. SKIN: Warm, dry and well perfused; no rash; no jaundice.       Invasive Devices       None Lab Results: Results: I have personally reviewed all pertinent laboratory/tests results    Imaging Results: I have personally reviewed pertinent reports. Chest Xray(s): negative for acute findings   FAST exam(s): N/A   CT Scan(s): positive for acute findings: see below   Additional Xray(s): positive for acute findings: see below     Other Studies:   XR femur 2 views RIGHT   ED Interpretation by René Crump MD (12/05 0808)   Per my independent interpretation: No acute osseous abnormality      Final Result by Vangie Davenport MD (12/05 7927)      No acute osseous abnormality. Workstation performed: [id number]         XR wrist 3+ views RIGHT   Final Result by Vangie Davenport MD (12/05 3040)      Possible nondisplaced fracture at the distal radial metaphysis. Correlate with site of pain. The study was marked in [facility] for immediate notification. Workstation performed: [id number]         XR chest 1 view portable   ED Interpretation by René Crump MD (49/11 1857)   Per my independent interpretation: No acute cardiopulmonary process      Final Result by Vangie Davenport MD (12/05 1015)      No active pulmonary disease. Workstation performed: [id number]         CT head without contrast   Final Result by Katherine Moise MD (56/41 0360)      No acute intracranial abnormality. Chronic findings, as above. Workstation performed: [id number]         CT spine cervical without contrast   Final Result by Katherine Moise MD (12/05 0935)      No cervical spine fracture or traumatic malalignment. Workstation performed: [id number]         CT abdomen pelvis with contrast   Final Result by Katherine Moise MD (/[id number])      Right pubic bone and acetabular fractures. No acute intra-abdominal pathology. Fecal retention in the redundant colon, mild. Indeterminate hypodense 1.3 cm right hepatic lobe lesion, poorly marginated. Recommend further characterization with nonemergent, possibly outpatient MRI with and without contrast.   Other findings, as per the body of the report.             Workstation performed: [id number]

## 2023-12-06 NOTE — OCCUPATIONAL THERAPY NOTE
Occupational Therapy Evaluation     Patient Name: Charisse Gracia  WZGUD'E Date: 12/6/2023  Problem List  Principal Problem:    Acetabulum fracture, right ([address])  Active Problems:    Alzheimer's dementia with behavioral disturbance (HCC)    Constipation    Sundowning    Elevated creatine kinase    Edema of left lower extremity    Acute encephalopathy    Fall    Weight loss    Urinary retention    Wrist fracture, closed, right, initial encounter    Past Medical History  Past Medical History:   Diagnosis Date    Altered mental status 11/01/2023    Constipation 11/01/2023    Dementia ([address])     Hypertension      Past Surgical History  Past Surgical History:   Procedure Laterality Date    COLONOSCOPY      DILATION AND CURETTAGE OF UTERUS      CO ANTERIOR COLPORRAPHY RPR CYSTOCELE W/CYSTO N/A 1/21/2020    Procedure: COLPORRHAPHY;  Surgeon: Mino Singh MD;  [facility];  Service: Gynecology    CO VAGINAL HYSTERECTOMY UTERUS 250 GM/< N/A 1/21/2020    Procedure: HYSTERECTOMY VAGINAL TOTAL (TVH) left salpingectomy;  Surgeon: Mino Singh MD;  [facility];  Service: Gynecology    WRIST FRACTURE SURGERY Right       12/06/23 0923   OT Last Visit   OT Visit Date 12/06/23   Note Type   Note type Evaluation  (+ tx session)   Pain Assessment   Pain Assessment Tool 0-10   Pain Score No Pain   Restrictions/Precautions   Weight Bearing Precautions Per Order Yes   RUE Weight Bearing Per Order (S)  WBAT   RLE Weight Bearing Per Order (S)  WBAT   Braces or Orthoses Splint  (Universal wrist splint R)   Other Precautions Cognitive; Chair Alarm; Bed Alarm; Fall Risk;WBS   Home Living   Type of [address] [place] [address] One level; Able to live on main level with bedroom/bathroom; Performs ADLs on one level;Stairs to enter without rails  (2 ADONAY)   Bathroom Shower/Tub Walk-in shower   Bathroom Toilet Raised   Bathroom Equipment Grab bars in shower;Grab bars around toilet   One [place] [employer] Cane  (RW)   Prior Function   Level of [place] Needs assistance with ADLs; Needs assistance with functional mobility; Needs assistance with IADLS   Lives With Spouse   Receives Help From Family   IADLs Family/Friend/Other provides transportation; Family/Friend/Other provides meals; Family/Friend/Other provides medication management   Falls in the last 6 months 1 to 4  (2 falls, 1 reason for admission)   Vocational Retired   Comments at baseline amb w/o AD   Lifestyle   Autonomy pta pt was (A) c ADLs and IADLs, (-) , 2 falls, lives with  who provides all care   Reciprocal Relationships    Service to Others retired   Intrinsic Gratification unable to assess   General   Additional Pertinent History hx of dementia   Family/Caregiver Present Yes   Additional General Comments pt ID by two factors , name and    ADL   Where Assessed Supine, bed   Eating Assistance 4  Minimal Assistance  ( reports requiring assist for feeding starting on [place] 12/3/23)   Grooming Assistance 4  Minimal Assistance    [address] 3  Moderate Assistance    [address] 3  Moderate Assistance   [address] 2  Maximal Assistance   LB Dressing Deficit Don/doff R sock; Don/doff L sock   Toileting Assistance  3  Moderate Assistance   Bed Mobility   Supine to Sit 2  Maximal assistance   Additional items Assist x 1;HOB elevated; Increased time required;LE management;Verbal cues   Additional Comments Pt OOB in recliner chair at end of OT session, alarm activated   Transfers   Sit to Stand 3  Moderate assistance   Additional items Assist x 2;Verbal cues; Increased time required   Stand to Sit 3  Moderate assistance   Additional items Assist x 1;Verbal cues; Increased time required   Toilet transfer 2  Maximal assistance   Additional items Assist x 1;Standard toilet; Increased time required;Verbal cues   Functional Mobility   Functional Mobility 3 Moderate assistance   Additional Comments ax2, short household distance completed with HHA   Additional items Hand hold assistance   Balance   Static Sitting Fair +   Dynamic Sitting Fair   Static Standing Poor   Dynamic Standing Poor -   Ambulatory Poor   Activity Tolerance   Activity Tolerance Patient limited by fatigue;Patient limited by pain   Medical Staff Rex1 Mack Arana coordination with Sachi PT   Nurse Made Aware nurse aware   RUE Assessment   RUE Assessment   (impaired)   LUE Assessment   LUE Assessment WFL   Vision-Basic Assessment   Current Vision Wears glasses all the time   Psychosocial   Psychosocial (WDL) WDL   Cognition   Overall Cognitive Status Impaired   Arousal/Participation Alert; Cooperative   Attention Difficulty attending to directions   Orientation Level Oriented to person;Disoriented to place; Disoriented to time;Disoriented to situation   Memory Decreased long term memory;Decreased recall of biographical information;Decreased short term memory;Decreased recall of recent events;Decreased recall of precautions   Following Commands Follows one step commands with increased time or repetition   Comments 2 pt identifiers: name and    Assessment   Limitation Decreased ADL status; Decreased UE ROM; Decreased UE strength;Decreased Safe judgement during ADL;Decreased cognition;Decreased endurance;Decreased self-care trans;Decreased high-level ADLs   Prognosis Good   Assessment Pt is a 82 y.o. female seen for OT evaluation s/p [facility] due to [facility]. Diagnosed with Acetabulum fracture, right ([address]). Personal and env factors supporting pt at time of IE include supportive  and (A) with all ADLs. Personal and env factors inhibiting engagement in occupations include advanced age, difficulty completing ADLs, difficulty completing IADLs, and impaired cognition . Performance deficits that affect the pt’s occupational performance can be seen above.  Due to pt's current functional limitations and medical complications pt is functioning below baseline. Pt would benefit from continued skilled OT treatment in order to maximize safety, independence and overall performance with ADLs, functional mobility, functional transfers, and cognition in order to achieve highest level of function. Goals   Patient Goals pt unable to identify goals d/t impaired cognition   LTG Time Frame 10-14   Long Term Goal see below   Plan   Treatment Interventions ADL retraining;Functional transfer training;UE strengthening/ROM; Endurance training;Cognitive reorientation;Patient/family training;Equipment evaluation/education; Compensatory technique education;Continued evaluation; Energy conservation; Activityengagement   Goal Expiration Date 12/20/23   OT Treatment Day 0   OT Frequency 3-5x/wk   Discharge Recommendation   Rehab Resource Intensity Level, OT II (Moderate Resource Intensity)   AM-PAC Daily Activity Inpatient   Lower Body Dressing 2   Bathing 2   Toileting 1   Upper Body Dressing 2   Grooming 2   Eating 2   Daily Activity Raw Score 11   Daily Activity Standardized Score (Calc for Raw Score >=11) 29.04   AM-PAC Applied Cognition Inpatient   Following a Speech/Presentation 1   Understanding Ordinary Conversation 2   Taking Medications 1   Remembering Where Things Are Placed or Put Away 1   Remembering List of 4-5 Errands 1   Taking Care of Complicated Tasks 1   Applied Cognition Raw Score 7   Applied Cognition Standardized Score 15.17   Additional Treatment Session   Start Time 0935   End Time 4186   Treatment Assessment Pt was seen for additional OT tx session focusing on ADLs, cognition, activity engagement, activity endurance, fnxl transfers, and fnxl mobility. Pt completed sit to stand from toilet with max A x1, required use of verbal and tactile cueing to complete sit to stand. Pt engaged in toileting, required max A for toilet tasks.  Pt engaged in grooming and was mod A with verbal cueing to engage in grooming tasks. Pt completed fnxl mobility to recliner chair with RW and was mod A x1, required verbal and tactile cues for RW management and weight shift for fnxl mobility. Pt complete STS from recliner chair w/ mod A x1. Pain increased throughout session. At the end of the session, pt was seated OOB in recliner with all needs met, no complaints. Additional Treatment Day 1   End of Consult   Education Provided Yes   Patient Position at End of Consult Bedside chair;Bed/Chair alarm activated; All needs within reach   Nurse Communication Nurse aware of consult     GOALS    Pt will improve activity tolerance to G for min 30 min txment sessions for increase engagement in functional tasks    Pt will complete bed mobility at a Mod I level w/ G balance/safety demonstrated to decrease caregiver assistance required     Pt will complete UB dressing/self care w/ mod A using adaptive device and DME as needed     Pt will complete LB dressing/self care w/ mod A using adaptive device and DME as needed    Pt will complete toileting w/ mod A w/ G hygiene/thoroughness using DME as needed    Pt will improve functional transfers to Mod I on/off all surfaces using DME as needed w/ G balance/safety     Pt will improve functional mobility during ADL/IADL/leisure tasks to Mod I using DME as needed w/ G balance/safety     Pt will demonstrate G carryover of pt/caregiver education and training as appropriate w/o cues w/ good tolerance to increase safety during functional tasks    The patient's raw score on the AM-PAC Daily Activity Inpatient Short Form is 11. A raw score of less than 19 suggests the patient may benefit from discharge to post-acute rehabilitation services. Please refer to the recommendation of the Occupational Therapist for safe discharge planning.     This session, pt required and most appropriately benefited from skilled OT/PT co-treat and co-eval due to extensive physical assistance of SKILLED therapists, decreased activity tolerance, and unpredictable medical and/or functional status. OT and PT goals were addressed separately as seen in documentation.      Brenda Mandujano, OTR/L

## 2023-12-06 NOTE — PLAN OF CARE
Problem: PAIN - ADULT  Goal: Verbalizes/displays adequate comfort level or baseline comfort level  Description: Interventions:  - Encourage patient to monitor pain and request assistance  - Assess pain using appropriate pain scale  - Administer analgesics based on type and severity of pain and evaluate response  - Implement non-pharmacological measures as appropriate and evaluate response  - Consider cultural and social influences on pain and pain management  - Notify physician/advanced practitioner if interventions unsuccessful or patient reports new pain  Outcome: Progressing     Problem: INFECTION - ADULT  Goal: Absence or prevention of progression during hospitalization  Description: INTERVENTIONS:  - Assess and monitor for signs and symptoms of infection  - Monitor lab/diagnostic results  - Monitor all insertion sites, i.e. indwelling lines, tubes, and drains  - Monitor endotracheal if appropriate and nasal secretions for changes in amount and color  - [place] appropriate cooling/warming therapies per order  - Administer medications as ordered  - Instruct and encourage patient and family to use good hand hygiene technique  - Identify and instruct in appropriate isolation precautions for identified infection/condition  Outcome: Progressing  Goal: Absence of fever/infection during neutropenic period  Description: INTERVENTIONS:  - Monitor WBC    Outcome: Progressing     Problem: SAFETY ADULT  Goal: Patient will remain free of falls  Description: INTERVENTIONS:  - Educate patient/family on patient safety including physical limitations  - Instruct patient to call for assistance with activity   - Consult OT/PT to assist with strengthening/mobility   - Keep Call bell within reach  - Keep bed low and locked with side rails adjusted as appropriate  - Keep care items and personal belongings within reach  - Initiate and maintain comfort rounds  - Make Fall Risk Sign visible to staff  - Offer Toileting every  Hours, in advance of need  - Initiate/Maintain alarm  - Obtain necessary fall risk management equipment:   - Apply yellow socks and bracelet for high fall risk patients  - Consider moving patient to room near nurses station  Outcome: Progressing  Goal: Maintain or return to baseline ADL function  Description: INTERVENTIONS:  -  Assess patient's ability to carry out ADLs; assess patient's baseline for ADL function and identify physical deficits which impact ability to perform ADLs (bathing, care of mouth/teeth, toileting, grooming, dressing, etc.)  - Assess/evaluate cause of self-care deficits   - Assess range of motion  - Assess patient's mobility; develop plan if impaired  - Assess patient's need for assistive devices and provide as appropriate  - Encourage maximum independence but intervene and supervise when necessary  - Involve family in performance of ADLs  - Assess for home care needs following discharge   - Consider OT consult to assist with ADL evaluation and planning for discharge  - Provide patient education as appropriate  Outcome: Progressing  Goal: Maintains/Returns to pre admission functional level  Description: INTERVENTIONS:  - Perform AM-PAC 6 Click Basic Mobility/ Daily Activity assessment daily.  - Set and communicate daily mobility goal to care team and patient/family/caregiver. - Collaborate with theabilitation services on mobility goals if consulted  - Perform Range of Motion 3 times a day. - Reposition patient every 3 hours.   - Dangle patient 3 times a day  - Stand patient 3 times a day  - Ambulate patient 3 times a day  - Out of bed to chair 3 times a day   - Out of bed for meals 3 times a day  - Out of bed for toileting  - Record patient progress and toleration of activity level   Outcome: Progressing

## 2023-12-06 NOTE — CASE MANAGEMENT
Case Management Discharge Planning Note    Patient name Renetta Kapoor  Location W /W -64 MRN [id number]  : 1940 Date 2023       Current Admission Date: 2023  Current Admission Diagnosis:Acetabulum fracture, right [facility])   Patient Active Problem List    Diagnosis Date Noted    Elevated creatine kinase 2023    Acetabulum fracture, right ([address]) 2023    Edema of left lower extremity 2023    Fall 2023    Weight loss 2023    Urinary retention 2023    Wrist fracture, closed, right, initial encounter 2023    Sundowning 2023    Hypertension 2023    Constipation 2023    Thrombocytopenia ([address]) 2023    Hypokalemia 2023    Right renal mass 2023    Alzheimer's dementia with behavioral disturbance ([address]) 10/31/2023    Status post vaginal hysterectomy, left salpingectomy, anterior repair 2020    Second degree uterine prolapse 2019    [place]-Walker grade 2 cystocele 2019      LOS (days): 1  Geometric Mean LOS (GMLOS) (days): 2.80  Days to GMLOS:1.6     OBJECTIVE:  Risk of Unplanned Readmission Score: 16.53         Current admission status: Inpatient   Preferred Pharmacy:   [id number] [facility], [address] [facility]. [address] [facility]   [address] [place] [address]  Phone: [phone] Fax: [phone]    Primary Care Provider: Milton Harkins MD    Primary Insurance: Lali Guadalupe [facility] [employer] [facility]  Secondary Insurance:     DISCHARGE DETAILS:    Discharge planning discussed with[de-identified]  Kylie Greenwood at bedside  [place] of Choice: Yes  Comments - Freedom of Choice: STR  CM contacted family/caregiver?: Yes  Were Treatment Team discharge recommendations reviewed with patient/caregiver?: Yes  Did patient/caregiver verbalize understanding of patient care needs?: N/A- going to facility  Were patient/caregiver advised of the risks associated with not following Treatment Team discharge recommendations?: Yes    Contacts  Patient Contacts: Tarun  Relationship to Patient[de-identified] Family ()  Contact Method: In Person  Reason/Outcome: Continuity of Care, Emergency Contact, Referral, Discharge [address]         Is the patient interested in [facility] at discharge?: No    DME Referral Provided  Referral made for DME?: No    Other Referral/Resources/Interventions Provided:  Interventions: SNF, Short Term Rehab  Referral Comments: Patient seen by PT/OT- recommended for STR. CM met with patient and  Guero Palomares at bedside to discuss.  agreeable to blanket referrals. Referral send via [address], [address] [place] [address]- facility notified of this. CM to follow up as able to continue with dcp.     Would you like to participate in our [facility] service program?  : No - Declined    Treatment Team Recommendation: SNF, Short Term Rehab  Discharge Destination Plan[de-identified] SNF, Short Term Rehab  Transport at Discharge : BLS Ambulance

## 2023-12-06 NOTE — PROGRESS NOTES
Progress Note - Geriatric Medicine   Fransico Church 82 y.o. female MRN: [id number]  Unit/Bed#:  W -01 Encounter: [id number]      Assessment/Plan:  Urinary retention  Assessment & Plan  Voiding via straight catheterization   Continue urinary retention protocol   Continue bladder scans   Encourage PO hydration    Weight loss  Assessment & Plan   reports recent weight loss  Encourage adequate PO intake  Continue PO protein supplementation with every meal    Fall  Assessment & Plan  Patient uses no AD for ambulation at baseline   reports recent unwitnessed fall 12/03  Monitor orthostatic vital signs  Vitamin D3 54.7  Continue Vitamin D3 2000 units PO daily  Encourage p.o. hydration  Avoid hypotension and hypoglycemia   [place] fall precautions   Assess patient frequently for physical needs, encourage use of assistant devices as needed and directed by PT/OT  Identify cognitive and physical deficits and behaviors that affect risk of falls  Consider moving patient closer to nursing station to monitor more closely for impulsive behavior which may increase risk of falls  Educate patient/family on patient safety including physical limitations and importance of using call bell for assistance   Modify environment to reduce risk of injury including disconnecting from pole when not in use, ensuring adequate lighting in room and restroom, ensuring that path to restroom is clear and free of trip hazards  PT/OT consulted to assist with strengthening/mobility and assist with discharge planning to appropriate level of care     Edema of left lower extremity  Assessment & Plan  Edema of LLE noted 12/05  Associated ecchymosis of LLE  Consider in the context of fall   Continue to monitor  Recommend b/l LE doppler    Elevated creatine kinase  Assessment & Plan  CK  12/05 is 471   Encourage fluids  Continue to monitor   Recheck CK level     Sundowning  Assessment & Plan   confirms sundowning daily at 4-5 pm Today pt exhibiting sundowning earlier at this time per   Increased disorientation  Increased disorientation and hallucinations  No history of violent behaviors noted  Monitor for behaviors  Reorient as needed   Continue melatonin 3 mg PO QHS  Continue Gabapentin 100 mg TID , 0900, 1400, 2100  Discontinued Seroquel   monitor EKG    Constipation  Assessment & Plan  No reported BM since 12/02  History of constipation   Continue senna 2 tablets PO BID  Continue MiraLAX daily, noncompliant a this time    Alzheimer's dementia with behavioral disturbance ([address])  Assessment & Plan  Pt. Is AAOx2 at baseline   Currently AAOx1  Pt.  Lives at home with    Dependent for instrumental ADLs  Dependent for ADLs  Nonviolent confusion behaviors at baseline  Hallucinations at baseline  TSH wnl  B12 resulted 302  Start vitamin B12 1000 mcg PO daily  reorient as needed  Monitor for behaviors   Monitor for mental status change  Provide supportive care     * Acetabulum fracture, right [facility])  Assessment & Plan  Mildly displaced right acetabulum fracture, present on ED admission 12/05  Patient denies pain at this time  Manage per orthopedics - non-operative at this time  Monitor for urinary retention    Acute encephalopathy-resolved as of 12/6/2023  Assessment & Plan  -Patient noted to have increased disorientation and hallucinations per    -Today 14:30 patient noted to have increased disoriented with auditory hallucinations in room  -Able to be reoriented   -Patient is high risk of delirium due to dementia at baseline, recent trauma, hospital admission  -Initiate delirium precautions  -maintain normal sleep/wake cycle- continue melatonin 3 mg QHS  -minimize overnight interruptions, group overnight vitals/labs/nursing checks as possible  -dim lights, close blinds and turn off tv to minimize stimulation and encourage sleep environment in evenings  -ensure that pain is well controlled, continue Tylenol 975mg Q8H scheduled -monitor for fecal and urinary retention which may precipitate delirium, check bladder scan daily  -encourage early mobilization and ambulation  -provide frequent reorientation and redirection  -encourage family and friends at the bedside to help calm patient if anxious  -avoid medications which may precipitate or worsen delirium such as tramadol, benzodiazepine, anticholinergics, and antihistaminics  -encourage hydration and nutrition , assist with feeding if needed  -redirect unwanted behaviors as first line, avoid physical restraints. -TSH wnl  - B12 302  -Continue Gabapentin 100 mg PO TID 0900, 1400, 2100  -discontinued Seroquel        Subjective:   Pt. was seen and evaluated at bedside for progress and is currently sitting at the edge of her chair OOB restless and disoriented. Before going into her room her  came out and requested someone to come in due to her "acting up." Upon entering the room pt. was in distress and agitated  in the room. I was able to reorient and calm her at bedside back to her baseline, confirmed baseline per . He states that she was exhibiting her baseline actions when she experiences sundowning. By the end of the encounter, she was calm. AAOx1 , oriented to self only. She denies any pain at this time. She tells me she slept well and has been eating good,  confirms this. HPI limited d/t dementia and acute agitation. Pt. Slept well last night and no behaviors were reported last night. Sleep last night was uncomplicated. BM not documented since admission. PO medications were tolerated and taken. Restraints not in use. Review of Systems   Unable to perform ROS: Dementia   Gastrointestinal:  Negative for abdominal pain. Musculoskeletal:  Negative for arthralgias. Objective:     Vitals: Blood pressure (!) 104/49, pulse 74, temperature 98.1 °F (36.7 °C), resp.  rate 17, height 5' 5" (1.651 m), weight 56.6 kg (124 lb 12.5 oz), SpO2 98 %, not currently breastfeeding. ,Body mass index is 20.76 kg/m². Intake/Output Summary (Last 24 hours) at 12/6/2023 1509  Last data filed at 12/5/2023 2130  Gross per 24 hour   Intake --   Output 648 ml   Net -648 ml       Current Medications: Reviewed    Physical Exam:   Physical Exam  Vitals and nursing note reviewed. Constitutional:       General: She is not in acute distress. HENT:      Head: Normocephalic and atraumatic. Mouth/Throat:      Mouth: Mucous membranes are dry. Eyes:      Conjunctiva/sclera: Conjunctivae normal.   Cardiovascular:      Rate and Rhythm: Regular rhythm. Tachycardia present. Heart sounds: Murmur heard. Pulmonary:      Effort: Pulmonary effort is normal. No respiratory distress. Breath sounds: Normal breath sounds. Abdominal:      Palpations: Abdomen is soft. Tenderness: There is no abdominal tenderness. Musculoskeletal:         General: No swelling. Cervical back: Neck supple. Left lower leg: Edema (improved) present. Skin:     General: Skin is warm and dry. Capillary Refill: Capillary refill takes less than 2 seconds. Findings: Bruising (LLE, improved) present. Neurological:      Mental Status: She is alert. She is disoriented and confused. Psychiatric:         Mood and Affect: Mood is anxious. Thought Content: Thought content is paranoid and delusional.         Cognition and Memory: Cognition is impaired. Memory is impaired. Invasive Devices       None                   Lab, Imaging and other studies: I have personally reviewed pertinent reports.

## 2023-12-06 NOTE — PLAN OF CARE
Problem: OCCUPATIONAL THERAPY ADULT  Goal: Performs self-care activities at highest level of function for planned discharge setting. See evaluation for individualized goals. Description: Treatment Interventions: ADL retraining, Functional transfer training, UE strengthening/ROM, Endurance training, Cognitive reorientation, Patient/family training, Equipment evaluation/education, Compensatory technique education, Continued evaluation, Energy conservation, Activityengagement          See flowsheet documentation for full assessment, interventions and recommendations. Outcome: Progressing  Note: Limitation: Decreased ADL status, Decreased UE ROM, Decreased UE strength, Decreased Safe judgement during ADL, Decreased cognition, Decreased endurance, Decreased self-care trans, Decreased high-level ADLs  Prognosis: Good  Assessment: Pt is a 82 y.o. female seen for OT evaluation s/p [facility]2023 due to fall. Diagnosed with Acetabulum fracture, right ([address] Personal and env factors supporting pt at time of IE include supportive  and (A) with all ADLs. Personal and env factors inhibiting engagement in occupations include advanced age, difficulty completing ADLs, difficulty completing IADLs, and impaired cognition . Performance deficits that affect the pt’s occupational performance can be seen above. Due to pt's current functional limitations and medical complications pt is functioning below baseline. Pt would benefit from continued skilled OT treatment in order to maximize safety, independence and overall performance with ADLs, functional mobility, functional transfers, and cognition in order to achieve highest level of function.      Rehab Resource Intensity Level, OT: II (Moderate Resource Intensity)

## 2023-12-06 NOTE — PLAN OF CARE
Problem: PHYSICAL THERAPY ADULT  Goal: Performs mobility at highest level of function for planned discharge setting. See evaluation for individualized goals. Description: Treatment/Interventions: ADL retraining, Functional transfer training, LE strengthening/ROM, Elevations, Therapeutic exercise, Endurance training, Cognitive reorientation, Patient/family training, Equipment eval/education, Bed mobility, Gait training, Compensatory technique education, Spoke to case management, OT, Family          See flowsheet documentation for full assessment, interventions and recommendations. Note:    Problem List: Decreased strength, Decreased endurance, Impaired balance, Decreased mobility, Decreased coordination, Decreased cognition, Impaired judgement, Decreased safety awareness, Pain  Assessment: Patient seen for Physical Therapy evaluation. Patient admitted with Acetabulum fracture, right ([address] Comorbidities affecting patient's physical performance include: Alzheimer's dementia, sundowning, edema left lower extremity, hypertension, hypokalemia. Personal factors affecting patient at time of initial evaluation include: lives in one story house, stairs to enter home, inability to navigate community distances, inability to navigate level surfaces without external assistance, inability to perform dynamic tasks in community, decreased cognition, positive fall history, decreased initiation and engagement, and limited insight into impairments. Prior to admission, patient was independent with functional mobility without assistive device, requiring assist for ADLS, requiring assist for IADLS, living with spouse in a one level home with 2 steps to enter, and ambulating household distance.   Please find objective findings from Physical Therapy assessment regarding body systems outlined above with impairments and limitations including weakness, impaired balance, decreased endurance, impaired coordination, gait deviations, pain, decreased activity tolerance, decreased functional mobility tolerance, decreased safety awareness, impaired judgement, fall risk, and decreased cognition. The Barthel Index was used as a functional outcome tool presenting with a score of Barthel Index Score: 40 today indicating marked limitations of functional mobility and ADLS. Patient's clinical presentation is currently unstable/unpredictable as seen in patient's presentation of vital sign response, changing level of pain, varying levels of cognitive performance, increased fall risk, new onset of impairment of functional mobility, decreased endurance, and new onset of weakness. Pt would benefit from continued Physical Therapy treatment to address deficits as defined above and maximize level of functional mobility. As demonstrated by objective findings, the assigned level of complexity for this evaluation is high. The patient's -[facility] Basic Mobility Inpatient Short Form Raw Score is 10. A Raw score of less than or equal to 16 suggests the patient may benefit from discharge to post-acute rehabilitation services. Please also refer to the recommendation of the Physical Therapist for safe discharge planning. Rehab Resource Intensity Level, PT: II (Moderate Resource Intensity)    See flowsheet documentation for full assessment.

## 2023-12-06 NOTE — ASSESSMENT & PLAN NOTE
- History of dementia  - Lives with  at independent living   - Takes Seroquel 25 mg QHS and overnight this medication was discontinued due to   - Recheck EKG today and plan to restart seroquel QHS   - Geriatric medicine consult

## 2023-12-06 NOTE — PHYSICAL THERAPY NOTE
PHYSICAL THERAPY EVALUATION/TREATMENT     12/06/23 0924   PT Last Visit   PT Visit Date 12/06/23   Note Type   Note type Evaluation   Pain Assessment   Pain Assessment Tool 0-10   Pain Score No Pain   Restrictions/Precautions   RUE Weight Bearing Per Order (S)  WBAT   RLE Weight Bearing Per Order (S)  WBAT   Braces or Orthoses   (Universal wrist splint R)   Other Precautions Cognitive; Fall Risk;Bed Alarm; Chair Alarm   Home Living   Type of [facility]  One level; Able to live on main level with bedroom/bathroom; Performs ADLs on one level;Stairs to enter without rails  (2 ADONAY)   Bathroom Shower/Tub Walk-in shower   Bathroom Toilet Raised   Bathroom Equipment Grab bars around toilet;Grab bars in shower   [address]  (RW)   Prior Function   Level of [place] Needs assistance with ADLs; Independent with functional mobility; Needs assistance with IADLS   Lives With Spouse   Receives Help From Family   IADLs Family/Friend/Other provides meals; Family/Friend/Other provides medication management; Family/Friend/Other provides transportation   Falls in the last 6 months 1 to 4  (2 falls, 1 reason for admission)   Comments Normally amb w/out an AD PTA   General   Additional Pertinent History Patient is admitted with a fall on 12/2/2023 where patient sustained a possible nondisplaced fracture radial metaphysis and right pubic bone and acetabular fracture. Patient has a history of dementia. Patient is normally a community ambulator. Family/Caregiver Present Yes  (pt's spouse)   Cognition   Overall Cognitive Status Impaired   Arousal/Participation Cooperative   Orientation Level Oriented to person;Disoriented to place; Disoriented to time;Disoriented to situation   Memory Decreased recall of precautions;Decreased recall of recent events;Decreased short term memory;Decreased recall of biographical information   Following Commands Follows one step commands with increased time or repetition   Comments At least 2 pt identifiers including name and    Subjective   Subjective Patient offers no specific complaints or comments   RLE Assessment   RLE Assessment WFL  (Grossly 3-3/5, unable to follow MMT)   LLE Assessment   LLE Assessment WFL  (Grossly 3/5, unable to follow MMT)   Vision-Basic Assessment   Current Vision Wears glasses all the time   Bed Mobility   Supine to Sit 2  Maximal assistance   Additional items Assist x 1;HOB elevated; Increased time required;Verbal cues;LE management  (Trunk management)   Transfers   Sit to Stand 3  Moderate assistance   Additional items Assist x 2;Verbal cues; Increased time required   Stand to Sit 3  Moderate assistance   Additional items Assist x 1;Verbal cues; Increased time required   Ambulation/Elevation   Gait pattern Short stride; Step to;Narrow FELISA; Decreased foot clearance; Foward flexed; Antalgic   Gait Assistance 3  Moderate assist   Additional items Assist x 2;Verbal cues; Tactile cues   Assistive Device None  (Handhold assist)   Distance 4 to 5 feet with change in direction; patient then ambulated with a roller walker with mod assist of 1 for 10 feet with change in direction   Balance   Static Sitting Fair +   Static Standing   (P/P+ with handhold assist or RW)   Ambulatory Poor  (With handhold assist or roller walker)   Activity Tolerance   Activity Tolerance Patient limited by fatigue;Patient limited by pain;Treatment limited secondary to medical complications (Comment)  (Impaired cognition)   Medical Staff Made Aware Care coordination with Mohamud Ervin OT   Assessment   Problem List Decreased strength;Decreased endurance; Impaired balance;Decreased mobility; Decreased coordination;Decreased cognition; Impaired judgement;Decreased safety awareness;Pain   Assessment Patient seen for Physical Therapy evaluation. Patient admitted with Acetabulum fracture, right ([address]).   Comorbidities affecting patient's physical performance include: Alzheimer's dementia, sundowning, edema left lower extremity, hypertension, hypokalemia. Personal factors affecting patient at time of initial evaluation include: lives in one story house, stairs to enter home, inability to navigate community distances, inability to navigate level surfaces without external assistance, inability to perform dynamic tasks in community, decreased cognition, positive fall history, decreased initiation and engagement, and limited insight into impairments. Prior to admission, patient was independent with functional mobility without assistive device, requiring assist for ADLS, requiring assist for IADLS, living with spouse in a one level home with 2 steps to enter, and ambulating household distance. Please find objective findings from Physical Therapy assessment regarding body systems outlined above with impairments and limitations including weakness, impaired balance, decreased endurance, impaired coordination, gait deviations, pain, decreased activity tolerance, decreased functional mobility tolerance, decreased safety awareness, impaired judgement, fall risk, and decreased cognition. The Barthel Index was used as a functional outcome tool presenting with a score of Barthel Index Score: 40 today indicating marked limitations of functional mobility and ADLS. Patient's clinical presentation is currently unstable/unpredictable as seen in patient's presentation of vital sign response, changing level of pain, varying levels of cognitive performance, increased fall risk, new onset of impairment of functional mobility, decreased endurance, and new onset of weakness. Pt would benefit from continued Physical Therapy treatment to address deficits as defined above and maximize level of functional mobility. As demonstrated by objective findings, the assigned level of complexity for this evaluation is high. The patient's -[facility] Basic Mobility Inpatient Short Form Raw Score is 10.  A Raw score of less than or equal to 16 suggests the patient may benefit from discharge to post-acute rehabilitation services. Please also refer to the recommendation of the Physical Therapist for safe discharge planning. Goals   Patient Goals Patient unable to state due to impaired cognition   STG Expiration Date 12/16/23   Short Term Goal #1 Patient will: Increase bilateral LE strength 1 grade to facilitate independent mobility, Perform all bed mobility tasks w/ minx1 to improve pt's independence w/ repositioning for decrease risk of skin breakdown, Perform all transfers w/ minx1 consistently from various height surfaces in order to improve I w/ engagement w/ real-world environments/situations, Ambulate at least 150+ ft. with roller walker w/ minx1 w/o LOB to facilitate return and engagement w/ previous living environment, Navigate 2 stairs with min assist to either improve independence w/ entering home and/or so patient can fully access living areas in home, Increase ambulatory and static and dynamic standing balance 1 grade to decrease risk for falls, Tolerate at least 10 consecutive minutes of activity to demonstrate improved activity tolerance and endurance, and Tolerate 3 hr OOB to faciliate upright tolerance   Plan   Treatment/Interventions ADL retraining;Functional transfer training;LE strengthening/ROM; Elevations; Therapeutic exercise; Endurance training;Cognitive reorientation;Patient/family training;Equipment eval/education; Bed mobility;Gait training; Compensatory technique education;Spoke to case management;OT;Family   PT Frequency 3-5x/wk   Discharge Recommendation   Rehab Resource Intensity Level, PT II (Moderate Resource Intensity)   AM-PAC Basic Mobility Inpatient   Turning in Flat Bed Without Bedrails 2   Lying on Back to Sitting on Edge of Flat Bed Without Bedrails 1   Moving Bed to Chair 2   Standing Up From Chair Using Arms 2   Walk in Room 2   Climb 3-5 Stairs With Railing 1   Basic Mobility Inpatient Raw Score 10   Turning Head Towards Sound 3   Follow Simple Instructions 2   Low Function Basic Mobility Raw Score  15   Low Function Basic Mobility Standardized Score  23.9   Highest Level Of Mobility   -[facility] Goal 4: Move to chair/commode   -[facility] Achieved 6: Walk 10 steps or more   Barthel Index   Feeding 5   Bathing 0   Grooming Score 0   Dressing Score 5   Bladder Score 10   Bowels Score 10   Toilet Use Score 5   Transfers (Bed/Chair) Score 5   Mobility (Level Surface) Score 0   Stairs Score 0   Barthel Index Score 40   Additional Treatment Session   Start Time 4567   End Time 4342   Treatment Assessment Patient agreeable to additional ambulation with and without the roller walker. Patient transfers sit to stand with mod assist of 1 and ambulates with a roller walker 10 feet with change in direction -patient having difficulty with forward propulsion but forward propulsion improves as PT moves a roller walker forward. Patient also attempted ambulation with min/mod handhold assist of 210 feet with change in direction patient transfers stand to sit with mod assist of 1. A: patient with fairly good tolerance to physical therapy evaluation and treatment despite impaired cognition as patient has a history of dementia. At this time, patient is noted with less antalgic gait with use of the roller walker and mod assist of 1 versus handhold assist of 2. Patient will continue to benefit from gait with a roller walker with progression as able. While admitted, patient will continue to benefit from skilled physical therapy services to increase her strength, functional mobility, balance, endurance and safe gait. When medically stable for discharge, patient is appropriate for level II moderate resource intensity. End of Consult   Patient Position at End of Consult Bedside chair;Bed/Chair alarm activated; All needs within reach   Licensure   [address] [place] [address] Number  [employer], PT   Portions of the documentation may have been created using voice recognition software. Occasional wrong word or sound alike substitutions may have occurred due to the inherent limitation of the voice recognition software. Read the chart carefully and recognize, using context, where substitutions have occurred.

## 2023-12-06 NOTE — ASSESSMENT & PLAN NOTE
-Patient noted to have increased disorientation and hallucinations per    -Able to be reoriented   -Patient is high risk of delirium due to dementia at baseline, recent trauma, hospital admission  -Initiate delirium precautions  -maintain normal sleep/wake cycle- continue melatonin 3 mg QHS  -minimize overnight interruptions, group overnight vitals/labs/nursing checks as possible  -dim lights, close blinds and turn off tv to minimize stimulation and encourage sleep environment in evenings  -ensure that pain is well controlled, continue Tylenol 975mg Q8H scheduled   -monitor for fecal and urinary retention which may precipitate delirium, check bladder scan daily  -encourage early mobilization and ambulation  -provide frequent reorientation and redirection  -encourage family and friends at the bedside to help calm patient if anxious  -avoid medications which may precipitate or worsen delirium such as tramadol, benzodiazepine, anticholinergics, and antihistaminics  -encourage hydration and nutrition , assist with feeding if needed  -redirect unwanted behaviors as first line, avoid physical restraints.    -TSH wnl  - B12 302  -Continue Gabapentin 100 mg PO TID 0900, 1400, 2100  - Seroquel 25 mg QHS resumed, monitor Qtc interval

## 2023-12-06 NOTE — ASSESSMENT & PLAN NOTE
Voiding via straight catheterization   Continue urinary retention protocol   Continue bladder scans   Encourage PO hydration

## 2023-12-06 NOTE — UTILIZATION REVIEW
Initial Clinical Review    Admission: Date/Time/Statement:   Admission Orders (From admission, onward)       Ordered        12/05/23 0926  Inpatient Admission  Once                          Orders Placed This Encounter   Procedures    Inpatient Admission     Standing Status:   Standing     Number of Occurrences:   1     Order Specific Question:   Level of Care     Answer:   Med Surg [16]     Order Specific Question:   Estimated length of stay     Answer:   More than 2 Midnights     Order Specific Question:   Certification     Answer:   I certify that inpatient services are medically necessary for this patient for a duration of greater than two midnights. See H&P and MD Progress Notes for additional information about the patient's course of treatment. ED Arrival Information       Expected   -    Arrival   12/5/2023 06:46    Acuity   Urgent              Means of arrival   Wheelchair    Escorted by   Family Member    Service   Trauma    Admission type   Emergency              Arrival complaint   Confusion/Possible UTI/Fall             Chief Complaint   Patient presents with    Altered Mental Status     Per  pt has been "delusional" for 2 days. States that she has been seeing things that aren't there. Also fell last night, denies head injury.  states she had similar symptoms last time she had UTI. Also reprots constipation       Initial Presentation: 82 y.o. female with hx dementiaw/ behavioral disturbance  , chronic thrombocytopenia who presents to ED from home after a fall 3 days agoon 12/2.  heard fall, found pt on R side. Pt not on ACV/AP.  felt pt did not have LOC or hit head .  was able to get the patient up and into bed, and he reports she had pain with going to the bathroom ever since and hasn't been getting out of bed much because of the pain. He reports that when she's in bed, she has no pain. He also reports she has been more confused over the past 24 hrs.  On exam, GCS 14,disoriented . 5 5/ motor strength . Right wrist ecchymosis  . ROM RLE limited due to pain. Imaging shows R wrist fx, R acetabular fx . Pt admitted as Inpatient with R wrist and R acetabular fx s/p fall by trauma service. Plan - Ortho consult, gerontology consult. WBAT RUE and RLE , neurovasc checks . Pain control. PT/OT . DVT ppx . Ortho consult- Denies pain presently . Distal radius looks like old or subacute fracture Looks like a pubic rami fracture and pubic root fracture as well on the right side. 2 + DP/PT pulses bilat, 2 + radial and ulnar pulses , sensation and motor intact BLE, BUE . No operative intervention indicated at this time. WBAT. Splint for the right wrist , F/U office in 4 weeks . Gerontology consult- Start PO protein supplementation with every meal for reported recent wt loss . Monitor orthostatic vital signs . Check Vitamin D3 . Start Vitamin D3 2000 units PO daily. Acute encephalopathy- Start Gabapentin 100 mg PO TID 0900, 1400, 2100  . discontinue Seroquel 25 mg PO QHS  . Recommend b/l LE doppler with LLE edema , ecchymosis noted today.  today- encourage fluids, recheck level in am .     Date: 12/6   Day 2:     OT/PT - Rehab Resource Intensity Level: II (Moderate Resource Intensity) . Basic Mobility Inpatient Short Form Raw Score is 10. A Raw score of less than or equal to 16 suggests the patient may benefit from discharge to post-acute rehabilitation services. Trauma- last bm 12/2. Increase bowel regime . Pt OOB to chair, reports feeling better than yesterday . RUE in removable splint; compartments soft  . +2 pulses bilaterally upper & lower extremities . GCS 14- disorientation as baseline . Takes Seroquel 25 mg QHS and overnight this medication was discontinued due to  . Recheck EKG today and plan to restart seroquel QHS  . Anticipate d/c to rehab . Case mgmt .      Date: 12/7    Day 3: Has surpassed a 2nd midnight with active treatments and services, which include :Safe d/c planning . Case mgmt working on SNF/ STR placement . Facility has opening tomorrow. Monitor bowel status- fleet enema ordered today . ED Triage Vitals   Temperature Pulse Respirations Blood Pressure SpO2   12/05/23 0701 12/05/23 0701 12/05/23 0701 12/05/23 0701 12/05/23 0701   98.7 °F (37.1 °C) 80 17 130/59 95 %      Temp Source Heart Rate Source Patient Position - Orthostatic VS BP Location FiO2 (%)   12/05/23 0701 12/05/23 0701 12/05/23 0701 12/05/23 0701 --   Oral Monitor Lying Right arm       Pain Score       12/05/23 1401       No Pain          Wt Readings from Last 1 Encounters:   12/05/23 56.6 kg (124 lb 12.5 oz)     Additional Vital Signs:   te/Time Temp Pulse Resp BP MAP (mmHg) SpO2   12/06/23 0836 -- -- -- -- -- 98 %   12/06/23 08:22:03 98.1 °F (36.7 °C) 74 -- 104/49 Abnormal  67 98 %   12/06/23 08:21:25 94.8 °F (34.9 °C) Abnormal  76 -- 104/49 Abnormal  67 98 %   12/05/23 21:35:41 98.5 °F (36.9 °C) 63 17 104/42 Abnormal  63 Abnormal  98 %   12/05/23 1506 97.6 °F (36.4 °C) 72 16 101/67 -- 97 %   12/05/23 1230 -- 93 16 130/58 -- 95 %   12/05/23 0712 98.7 °F (37.1 °C) -- -- -- -- --     ate and Time R Pedal Pulse   12/06/23 0836 +2   12/05/23 2300 +2           Pertinent Labs/Diagnostic Test Results:   12/5 ECG- Undetermined rhythm  Baseline Artifact  XR femur 2 views RIGHT   ED Interpretation by Jose Miles MD (12/05 0845)   Per my independent interpretation: No acute osseous abnormality      Final Result by Tom Deutsch MD (12/05 8113)      No acute osseous abnormality. Workstation performed: [id number]         XR wrist 3+ views RIGHT   Final Result by Tom Deutsch MD (12/05 2577)      Possible nondisplaced fracture at the distal radial metaphysis. Correlate with site of pain. The study was marked in [facility] for immediate notification.             Workstation performed: [id number]         XR chest 1 view portable   ED Interpretation by Jose Miles MD (12/05 9197)   Per my independent interpretation: No acute cardiopulmonary process      Final Result by Antonino Best MD (12/05 3558)      No active pulmonary disease. Workstation performed: [id number]         CT head without contrast   Final Result by Peggy Ponce MD (70/77 7317)      No acute intracranial abnormality. Chronic findings, as above. Workstation performed: [id number]         CT spine cervical without contrast   Final Result by Peggy Ponce MD (12/05 1737)      No cervical spine fracture or traumatic malalignment. Workstation performed: [id number]         CT abdomen pelvis with contrast   Final Result by Peggy Ponce MD (68/52 8624)      Right pubic bone and acetabular fractures. No acute intra-abdominal pathology. Fecal retention in the redundant colon, mild. Indeterminate hypodense 1.3 cm right hepatic lobe lesion, poorly marginated. Recommend further characterization with nonemergent, possibly outpatient MRI with and without contrast.   Other findings, as per the body of the report.             Workstation performed: [id number]               Results from last 7 days   Lab Units 12/06/23 0651 12/05/23 0727   WBC Thousand/uL 7.29 9.49   HEMOGLOBIN g/dL 11.9 12.0   HEMATOCRIT % 35.7 35.5   PLATELETS Thousands/uL 101* 98*   NEUTROS ABS Thousands/µL 4.58 7.79*         Results from last 7 days   Lab Units 12/06/23 0651 12/05/23 0727   SODIUM mmol/L 139 140   POTASSIUM mmol/L 3.5 3.4*   CHLORIDE mmol/L 103 103   CO2 mmol/L 28 30   ANION GAP mmol/L 8 7   BUN mg/dL 27* 30*   CREATININE mg/dL 0.80 0.81   EGFR ml/min/1.73sq m 69 68   CALCIUM mg/dL 9.4 9.3   MAGNESIUM mg/dL  --  1.8*     Results from last 7 days   Lab Units 12/05/23  0727   AST U/L 24   ALT U/L 17   ALK PHOS U/L 42   TOTAL PROTEIN g/dL 6.2*   ALBUMIN g/dL 4.1   TOTAL BILIRUBIN mg/dL 1.05*         Results from last 7 days   Lab Units 12/06/23 0651 12/05/23 0727   GLUCOSE RANDOM mg/dL 96 119               Results from last 7 days   Lab Units 12/05/23  0727   CK TOTAL U/L 471*     Results from last 7 days   Lab Units 12/05/23  1228 12/05/23  0949 12/05/23  0727   HS TNI 0HR ng/L  --   --  32   HS TNI 2HR ng/L  --  36  --    HSTNI D2 ng/L  --  4  --    HS TNI 4HR ng/L 41  --   --    HSTNI D4 ng/L 9  --   --              Results from last 7 days   Lab Units 12/05/23  0727   TSH 3RD GENERATON uIU/mL 0.687               Results from last 7 days   Lab Units 12/05/23  0744   CLARITY UA  Clear   COLOR UA  Light Yellow   SPEC GRAV UA  1.020   PH UA  6.5   GLUCOSE UA mg/dl Negative   KETONES UA mg/dl 10 (1+)*   BLOOD UA  Negative   PROTEIN UA mg/dl Negative   NITRITE UA  Negative   BILIRUBIN UA  Negative   UROBILINOGEN UA (BE) mg/dl <2.0   LEUKOCYTES UA  Negative                                                   ED Treatment:   Medication Administration from 12/05/2023 0646 to 12/05/2023 1739         Date/Time Order Dose Route Action     12/05/2023 1229 EST sodium chloride 0.9 % bolus 500 mL 0 mL Intravenous Stopped     12/05/2023 0734 EST sodium chloride 0.9 % bolus 500 mL 500 mL Intravenous New Bag     12/05/2023 0814 EST iohexol (OMNIPAQUE) 350 MG/ML injection (MULTI-DOSE) 85 mL 85 mL Intravenous Given     12/05/2023 1112 EST potassium chloride 20 mEq IVPB (premix) 0 mEq Intravenous Stopped     12/05/2023 0912 EST potassium chloride 20 mEq IVPB (premix) 20 mEq Intravenous New Bag     12/05/2023 0946 EST enoxaparin (LOVENOX) subcutaneous injection 30 mg 30 mg Subcutaneous Given     12/05/2023 1248 EST felodipine (PLENDIL) 24 hr tablet 10 mg 10 mg Oral Given     12/05/2023 1249 EST lisinopril (ZESTRIL) tablet 20 mg 20 mg Oral Given     12/05/2023 1249 EST hydrochlorothiazide (HYDRODIURIL) tablet 25 mg 25 mg Oral Given     12/05/2023 1248 EST senna-docusate sodium (SENOKOT S) 8.6-50 mg per tablet 1 tablet 1 tablet Oral Given     12/05/2023 1249 EST ascorbic acid (VITAMIN C) tablet 250 mg 250 mg Oral Given     12/05/2023 1249 EST acetaminophen (TYLENOL) tablet 975 mg 975 mg Oral Given     12/05/2023 1247 EST polyethylene glycol (MIRALAX) packet 17 g 17 g Oral Given     12/05/2023 1316 EST gabapentin (NEURONTIN) capsule 100 mg 100 mg Oral Given          Past Medical History:   Diagnosis Date    Altered mental status 11/01/2023    Constipation 11/01/2023    Dementia ([address])     Hypertension      Present on Admission:   Acetabulum fracture, right ([address])   Sundowning   Alzheimer's dementia with behavioral disturbance ([address])   Constipation      Admitting Diagnosis: Hypokalemia [E87.6]  Alzheimer's dementia with behavioral disturbance ([address]) [G30.9, F02.818]  Acetabular fracture ([address]) [S32.409A]  Distal radius fracture [S52.509A]  Liver lesion [K76.9]  Fall in elderly patient [R29.6]  Age/Sex: 82 y.o. female  Admission Orders:  Scheduled Medications:  acetaminophen, 975 mg, Oral, Q8H [address]  ascorbic acid, 250 mg, Oral, Daily  cholecalciferol, 2,000 Units, Oral, Daily  enoxaparin, 30 mg, Subcutaneous, Q12H  felodipine, 10 mg, Oral, Daily  gabapentin, 100 mg, Oral, TID  lisinopril, 20 mg, Oral, Daily   And  hydrochlorothiazide, 25 mg, Oral, Daily  magnesium citrate, 296 mL, Oral, Once  melatonin, 3 mg, Oral, HS  polyethylene glycol, 17 g, Oral, Daily  senna-docusate sodium, 1 tablet, Oral, BID      Continuous IV Infusions:     PRN Meds:     Volar splint R wrist   OOB to chair TID    SCD's     IP CONSULT TO ORTHOPEDIC SURGERY  IP CONSULT TO GERONTOLOGY  IP CONSULT TO CASE MANAGEMENT  IP CONSULT TO NUTRITION SERVICES    Network Utilization Review Department  ATTENTION: Please call with any questions or concerns to [phone] and carefully listen to the prompts so that you are directed to the right person. All voicemails are confidential.   For Discharge needs, contact Care Management DC Support Team at [phone] opt.  2  Send all requests for admission clinical reviews, approved or denied determinations and any other requests to dedicated fax number below belonging to the campus where the patient is receiving treatment.  List of dedicated fax numbers for the Facilities:  [place] DENIALS (Administrative/Medical Necessity) [phone]   DISCHARGE SUPPORT TEAM (NETWORK) [address] (Maternity/NICU/Pediatrics) [phone]   [address] [place] [address] Drive [address] [place] [address] 1000 [facility] [phone] [address] 5220 [facility] [address] [address] [facility] [address] [phone]

## 2023-12-07 PROCEDURE — 99214 OFFICE O/P EST MOD 30 MIN: CPT | Performed by: FAMILY MEDICINE

## 2023-12-07 PROCEDURE — 99232 SBSQ HOSP IP/OBS MODERATE 35: CPT | Performed by: STUDENT IN AN ORGANIZED HEALTH CARE EDUCATION/TRAINING PROGRAM

## 2023-12-07 RX ORDER — ENEMA 19; 7 G/133ML; G/133ML
1 ENEMA RECTAL ONCE
Status: DISCONTINUED | OUTPATIENT
Start: 2023-12-07 | End: 2023-12-08 | Stop reason: HOSPADM

## 2023-12-07 RX ADMIN — GABAPENTIN 100 MG: 100 CAPSULE ORAL at 09:26

## 2023-12-07 RX ADMIN — GABAPENTIN 100 MG: 100 CAPSULE ORAL at 14:28

## 2023-12-07 RX ADMIN — ENOXAPARIN SODIUM 30 MG: 30 INJECTION SUBCUTANEOUS at 21:08

## 2023-12-07 RX ADMIN — ACETAMINOPHEN 975 MG: 325 TABLET, FILM COATED ORAL at 14:27

## 2023-12-07 RX ADMIN — Medication 250 MG: at 09:35

## 2023-12-07 RX ADMIN — POLYETHYLENE GLYCOL 3350 17 G: 17 POWDER, FOR SOLUTION ORAL at 09:26

## 2023-12-07 RX ADMIN — QUETIAPINE FUMARATE 25 MG: 25 TABLET ORAL at 21:08

## 2023-12-07 RX ADMIN — SENNOSIDES AND DOCUSATE SODIUM 1 TABLET: 8.6; 5 TABLET ORAL at 18:14

## 2023-12-07 RX ADMIN — ACETAMINOPHEN 975 MG: 325 TABLET, FILM COATED ORAL at 21:08

## 2023-12-07 RX ADMIN — ENOXAPARIN SODIUM 30 MG: 30 INJECTION SUBCUTANEOUS at 09:35

## 2023-12-07 RX ADMIN — ACETAMINOPHEN 975 MG: 325 TABLET, FILM COATED ORAL at 06:26

## 2023-12-07 RX ADMIN — FELODIPINE 10 MG: 2.5 TABLET, FILM COATED, EXTENDED RELEASE ORAL at 09:26

## 2023-12-07 RX ADMIN — SENNOSIDES AND DOCUSATE SODIUM 1 TABLET: 8.6; 5 TABLET ORAL at 09:26

## 2023-12-07 RX ADMIN — GABAPENTIN 100 MG: 100 CAPSULE ORAL at 21:08

## 2023-12-07 RX ADMIN — Medication 3 MG: at 21:08

## 2023-12-07 NOTE — PLAN OF CARE
Problem: Potential for Falls  Goal: Patient will remain free of falls  Description: INTERVENTIONS:  - Educate patient/family on patient safety including physical limitations  - Instruct patient to call for assistance with activity   - Consult OT/PT to assist with strengthening/mobility   - Keep Call bell within reach  - Keep bed low and locked with side rails adjusted as appropriate  - Keep care items and personal belongings within reach  - Initiate and maintain comfort rounds  - Make Fall Risk Sign visible to staff  - Offer Toileting every  Hours, in advance of need  - Initiate/Maintain alarm  - Obtain necessary fall risk management equipment:   - Apply yellow socks and bracelet for high fall risk patients  - Consider moving patient to room near nurses station  Outcome: Progressing     Problem: PAIN - ADULT  Goal: Verbalizes/displays adequate comfort level or baseline comfort level  Description: Interventions:  - Encourage patient to monitor pain and request assistance  - Assess pain using appropriate pain scale  - Administer analgesics based on type and severity of pain and evaluate response  - Implement non-pharmacological measures as appropriate and evaluate response  - Consider cultural and social influences on pain and pain management  - Notify physician/advanced practitioner if interventions unsuccessful or patient reports new pain  Outcome: Progressing     Problem: INFECTION - ADULT  Goal: Absence or prevention of progression during hospitalization  Description: INTERVENTIONS:  - Assess and monitor for signs and symptoms of infection  - Monitor lab/diagnostic results  - Monitor all insertion sites, i.e. indwelling lines, tubes, and drains  - Monitor endotracheal if appropriate and nasal secretions for changes in amount and color  - [place] appropriate cooling/warming therapies per order  - Administer medications as ordered  - Instruct and encourage patient and family to use good hand hygiene technique  - Identify and instruct in appropriate isolation precautions for identified infection/condition  Outcome: Progressing  Goal: Absence of fever/infection during neutropenic period  Description: INTERVENTIONS:  - Monitor WBC    Outcome: Progressing     Problem: SAFETY ADULT  Goal: Patient will remain free of falls  Description: INTERVENTIONS:  - Educate patient/family on patient safety including physical limitations  - Instruct patient to call for assistance with activity   - Consult OT/PT to assist with strengthening/mobility   - Keep Call bell within reach  - Keep bed low and locked with side rails adjusted as appropriate  - Keep care items and personal belongings within reach  - Initiate and maintain comfort rounds  - Make Fall Risk Sign visible to staff  - Offer Toileting every  Hours, in advance of need  - Initiate/Maintain alarm  - Obtain necessary fall risk management equipment:   - Apply yellow socks and bracelet for high fall risk patients  - Consider moving patient to room near nurses station  Outcome: Progressing  Goal: Maintain or return to baseline ADL function  Description: INTERVENTIONS:  -  Assess patient's ability to carry out ADLs; assess patient's baseline for ADL function and identify physical deficits which impact ability to perform ADLs (bathing, care of mouth/teeth, toileting, grooming, dressing, etc.)  - Assess/evaluate cause of self-care deficits   - Assess range of motion  - Assess patient's mobility; develop plan if impaired  - Assess patient's need for assistive devices and provide as appropriate  - Encourage maximum independence but intervene and supervise when necessary  - Involve family in performance of ADLs  - Assess for home care needs following discharge   - Consider OT consult to assist with ADL evaluation and planning for discharge  - Provide patient education as appropriate  Outcome: Progressing  Goal: Maintains/Returns to pre admission functional level  Description: INTERVENTIONS:  - Perform AM-PAC 6 Click Basic Mobility/ Daily Activity assessment daily.  - Set and communicate daily mobility goal to care team and patient/family/caregiver. - Collaborate with rehabilitation services on mobility goals if consulted  - Perform Range of Motion 3 times a day. - Reposition patient every 3 hours.   - Dangle patient 3 times a day  - Stand patient 3 times a day  - Ambulate patient 3 times a day  - Out of bed to chair 3 times a day   - Out of bed for meals 3 times a day  - Out of bed for toileting  - Record patient progress and toleration of activity level   Outcome: Progressing

## 2023-12-07 NOTE — CASE MANAGEMENT
[address] [facility] has received approved authorization from insurance:   CBC  Called in by Bret Garay  P#: [phone]   Authorization received for: SNF  Facility:  [address] [place] [address] #: [id number]    Start of Care: 12/7  Next Review Date: 12/13  Continued Stay Care Coordinator: Elsy GOMEZ#: [phone] [facility] next review to: [phone]   Care Manager notified: Isabel Diaz

## 2023-12-07 NOTE — ASSESSMENT & PLAN NOTE
- History of dementia  - Lives with  at independent living   - Takes Seroquel 25 mg QHS , monitor QTC  - Geriatric medicine consult

## 2023-12-07 NOTE — CASE MANAGEMENT
[address] [facility] received request for authorization from Care Manager.   Authorization request for: SNF  Facility Name:  [facility] NPI: [id number]  Facility MD:  Dr. Nelson Nagel   NPI: [id number]  Authorization initiated by contacting insurance:  CBC  Via: [facility]   Clinicals submitted via: Availity attachment   Pending Reference #: [id number]

## 2023-12-07 NOTE — MALNUTRITION/BMI
This medical record reflects one or more clinical indicators suggestive of malnutrition and/or morbid obesity. Malnutrition Findings:   Adult Malnutrition type: Acute illness (in the setting of chronic illness)  Adult Degree of Malnutrition: Malnutrition of mild degree  Malnutrition Characteristics: Fat loss, Muscle loss, Weight loss                  360 Statement: Mild malnutrition related to progression of disease as evidenced by loss of subcutaneous fat and muscle, osseous, extremities, 16.3% weight decrease x < 1 year. Currently treated with oral supplementation, nutrition consult. BMI Findings: Body mass index is 20.76 kg/m². See Nutrition note dated 12/7/2023 for additional details. Completed nutrition assessment is viewable in the nutrition documentation.

## 2023-12-07 NOTE — PLAN OF CARE
Problem: Potential for Falls  Goal: Patient will remain free of falls  Description: INTERVENTIONS:  - Educate patient/family on patient safety including physical limitations  - Instruct patient to call for assistance with activity   - Consult OT/PT to assist with strengthening/mobility   - Keep Call bell within reach  - Keep bed low and locked with side rails adjusted as appropriate  - Keep care items and personal belongings within reach  - Initiate and maintain comfort rounds  - Make Fall Risk Sign visible to staff  - Offer Toileting every 2 Hours, in advance of need  - Initiate/Maintain bedalarm  - Obtain necessary fall risk management equipment: bed alarm  - Apply yellow socks and bracelet for high fall risk patients  - Consider moving patient to room near nurses station  Outcome: Progressing     Problem: Prexisting or High Potential for Compromised Skin Integrity  Goal: Skin integrity is maintained or improved  Description: INTERVENTIONS:  - Identify patients at risk for skin breakdown  - Assess and monitor skin integrity  - Assess and monitor nutrition and hydration status  - Monitor labs   - Assess for incontinence   - Turn and reposition patient  - Assist with mobility/ambulation  - Relieve pressure over bony prominences  - Avoid friction and shearing  - Provide appropriate hygiene as needed including keeping skin clean and dry  - Evaluate need for skin moisturizer/barrier cream  - Collaborate with interdisciplinary team   - Patient/family teaching  - Consider wound care consult   Outcome: Progressing     Problem: PAIN - ADULT  Goal: Verbalizes/displays adequate comfort level or baseline comfort level  Description: Interventions:  - Encourage patient to monitor pain and request assistance  - Assess pain using appropriate pain scale  - Administer analgesics based on type and severity of pain and evaluate response  - Implement non-pharmacological measures as appropriate and evaluate response  - Consider cultural and social influences on pain and pain management  - Notify physician/advanced practitioner if interventions unsuccessful or patient reports new pain  Outcome: Progressing     Problem: INFECTION - ADULT  Goal: Absence or prevention of progression during hospitalization  Description: INTERVENTIONS:  - Assess and monitor for signs and symptoms of infection  - Monitor lab/diagnostic results  - Monitor all insertion sites, i.e. indwelling lines, tubes, and drains  - Monitor endotracheal if appropriate and nasal secretions for changes in amount and color  - [place] appropriate cooling/warming therapies per order  - Administer medications as ordered  - Instruct and encourage patient and family to use good hand hygiene technique  - Identify and instruct in appropriate isolation precautions for identified infection/condition  Outcome: Progressing  Goal: Absence of fever/infection during neutropenic period  Description: INTERVENTIONS:  - Monitor WBC    Outcome: Progressing     Problem: SAFETY ADULT  Goal: Patient will remain free of falls  Description: INTERVENTIONS:  - Educate patient/family on patient safety including physical limitations  - Instruct patient to call for assistance with activity   - Consult OT/PT to assist with strengthening/mobility   - Keep Call bell within reach  - Keep bed low and locked with side rails adjusted as appropriate  - Keep care items and personal belongings within reach  - Initiate and maintain comfort rounds  - Make Fall Risk Sign visible to staff  - Offer Toileting every 2 Hours, in advance of need  - Initiate/Maintain bed alarm  - Obtain necessary fall risk management equipment: bed alarm  - Apply yellow socks and bracelet for high fall risk patients  - Consider moving patient to room near nurses station  Outcome: Progressing  Goal: Maintain or return to baseline ADL function  Description: INTERVENTIONS:  -  Assess patient's ability to carry out ADLs; assess patient's baseline for ADL function and identify physical deficits which impact ability to perform ADLs (bathing, care of mouth/teeth, toileting, grooming, dressing, etc.)  - Assess/evaluate cause of self-care deficits   - Assess range of motion  - Assess patient's mobility; develop plan if impaired  - Assess patient's need for assistive devices and provide as appropriate  - Encourage maximum independence but intervene and supervise when necessary  - Involve family in performance of ADLs  - Assess for home care needs following discharge   - Consider OT consult to assist with ADL evaluation and planning for discharge  - Provide patient education as appropriate  Outcome: Progressing  Goal: Maintains/Returns to pre admission functional level  Description: INTERVENTIONS:  - Perform AM-PAC 6 Click Basic Mobility/ Daily Activity assessment daily.  - Set and communicate daily mobility goal to care team and patient/family/caregiver. - Collaborate with rehabilitation services on mobility goals if consulted  - Perform Range of Motion 3 times a day. - Reposition patient every 2 hours.   - Dangle patient 3 times a day  - Stand patient 3 times a day  - Ambulate patient 3 times a day  - Out of bed to chair 3 times a day   - Out of bed for meals 3 times a day  - Out of bed for toileting  - Record patient progress and toleration of activity level   Outcome: Progressing     Problem: DISCHARGE PLANNING  Goal: Discharge to home or other facility with appropriate resources  Description: INTERVENTIONS:  - Identify barriers to discharge w/patient and caregiver  - Arrange for needed discharge resources and transportation as appropriate  - Identify discharge learning needs (meds, wound care, etc.)  - Arrange for interpretive services to assist at discharge as needed  - Refer to Case Management Department for coordinating discharge planning if the patient needs post-hospital services based on physician/advanced practitioner order or complex needs related to functional status, cognitive ability, or social support system  Outcome: Progressing     Problem: Knowledge Deficit  Goal: Patient/family/caregiver demonstrates understanding of disease process, treatment plan, medications, and discharge instructions  Description: Complete learning assessment and assess knowledge base.   Interventions:  - Provide teaching at level of understanding  - Provide teaching via preferred learning methods  Outcome: Progressing

## 2023-12-07 NOTE — PROGRESS NOTES
[address] [facility]  Progress Note  Name: James Hummel  MRN: [id number]  Unit/Bed#: W -01 I Date of Admission: 12/5/2023   Date of Service: 12/7/2023 I Hospital Day: 2    Assessment/Plan   Wrist fracture, closed, right, initial encounter  Assessment & Plan  - Possible right wrist fracture on XR with overlying ecchymosis, present on admission.  - Status post fall on 12/2. - Appreciate Orthopedic surgery evaluation, recommendations and interventions as noted. - Likely subacute/ chronic. Non operative management  - Maintain WBAT RUE  - Monitor right upper extremity neurovascular exam.  - Continue multimodal analgesic regimen.  - Continue DVT prophylaxis. - PT and OT evaluation and treatment as indicated. - Outpatient follow up with Orthopedic surgery for re-evaluation. Fall  Assessment & Plan  - Status post fall with the below noted injuries. - Fall precautions. - Geriatric Medicine consultation for evaluation, medication review and recommendations.  - PT and OT evaluation and treatment as indicated. - Case Management consultation for disposition planning. Constipation  Assessment & Plan  - Increase bowel regimen, last BM was Saturday 11/25  - Enema today    Alzheimer's dementia with behavioral disturbance ([address])  Assessment & Plan  - History of dementia  - Lives with  at independent living   - Takes Seroquel 25 mg QHS , monitor QTC  - Geriatric medicine consult    * Acetabulum fracture, right ([address])  Assessment & Plan  - Mildly displaced right acetabulum fracture, present on admission  - Status post fall on Saturday, 12/2. - Appreciate Orthopedic surgery evaluation, recommendations and interventions as noted. - Non operative management  - Maintain WBAT RLE   - Monitor right lower extremity neurovascular exam.  - Continue multimodal analgesic regimen.  - Continue DVT prophylaxis. - PT and OT evaluation and treatment as indicated.   - Outpatient follow up with Orthopedic surgery for re-evaluation. Bowel Regimen: senokot S, miralax, enema  VTE Prophylaxis:Enoxaparin (Lovenox)     Disposition: Anticipate discharge to rehab within 24 hrs    Subjective   Chief Complaint: "I'm fine"    Subjective: Patient reports that she is feeling fine. She has history of dementia and is a poor historian, however her  is at bedside and is very helpful. He is concerned that she continues to be constipated even though she is eating. No nausea/ vomiting. And attempting ambulation with PT     Objective   Vitals:   Temp:  [97.8 °F (36.6 °C)-98.3 °F (36.8 °C)] 98.2 °F (36.8 °C)  HR:  [65-72] 66  Resp:  [12] 12  BP: ()/(49-63) 117/57    I/O         12/05 0701 12/06 0700 12/06 0701 12/07 0700 12/07 0701 12/08 0700    IV Piggyback 600      Total Intake(mL/kg) 600 (10.6)      Urine (mL/kg/hr) 648 (0.5) 364 (0.3)     Total Output 648 364     Net -48 -364                     Physical Exam:   GENERAL APPEARANCE: Patient in no acute distress. HEENT: NCAT; PERRL, EOMs intact; Mucous membranes moist  CV: Regular rate and rhythm; no murmur/gallops/rubs appreciated. CHEST / LUNGS: Clear to auscultation; no wheezes/rales/rhonci. ABD: NABS; soft; non-distended; non-tender. : Voiding  EXT: +2 pulses bilaterally upper & lower extremities; no edema. NEURO: GCS 14 due to disorientation; no focal neurologic deficits; neurovascularly intact. SKIN: Warm, dry and well perfused; no rash; no jaundice. Invasive Devices       None                         Lab Results: Results: I have personally reviewed all pertinent laboratory/tests results  Imaging: I have personally reviewed pertinent reports. Other Studies:   XR femur 2 views RIGHT   ED Interpretation by Shanita Martines MD (12/05 7689)   Per my independent interpretation: No acute osseous abnormality      Final Result by Cholo Nicholson MD (12/05 3907)      No acute osseous abnormality.             Workstation performed: [id number]         XR wrist 3+ views RIGHT   Final Result by Brit Rod MD (12/05 1110)      Possible nondisplaced fracture at the distal radial metaphysis. Correlate with site of pain. The study was marked in [facility] for immediate notification. Workstation performed: [id number]         XR chest 1 view portable   ED Interpretation by Janey Munguia MD (97/60 8512)   Per my independent interpretation: No acute cardiopulmonary process      Final Result by Brit Rod MD (12/05 0659)      No active pulmonary disease. Workstation performed: [id number]         CT head without contrast   Final Result by Bre Kramer MD (72/74 7235)      No acute intracranial abnormality. Chronic findings, as above. Workstation performed: [id number]         CT spine cervical without contrast   Final Result by Bre Kramer MD (12/05 5394)      No cervical spine fracture or traumatic malalignment. Workstation performed: [id number]         CT abdomen pelvis with contrast   Final Result by Bre Kramer MD (03/63 7672)      Right pubic bone and acetabular fractures. No acute intra-abdominal pathology. Fecal retention in the redundant colon, mild. Indeterminate hypodense 1.3 cm right hepatic lobe lesion, poorly marginated. Recommend further characterization with nonemergent, possibly outpatient MRI with and without contrast.   Other findings, as per the body of the report.             Workstation performed: [id number]

## 2023-12-07 NOTE — OCCUPATIONAL THERAPY NOTE
Occupational Therapy Cancellation Note     Patient Name: Hakan Miller  KTNLN'Z Date: 12/7/2023 12/07/23 2782   Note Type   Note Type Cancelled Session   Cancel Reasons Other  Attempted to see patient for OT treatment. Spoke with RN whom reports patient to be sundowning with onset of agitation. Pt declining interest in working with OT at this time. Upon arrival to room universal splint not donned, total assist to pavan R wrist brace while supine in bed. Will f/u at a later time to continue OT POC.        Kwame Beaver, [address] [facility] [place] OTR/L   [place] Licensure# [id number]

## 2023-12-07 NOTE — CASE MANAGEMENT
Case Management Discharge Planning Note    Patient name Fransico Church  Location W /W -02 MRN [id number]  : 1940 Date 2023       Current Admission Date: 2023  Current Admission Diagnosis:Acetabulum fracture, right [facility])   Patient Active Problem List    Diagnosis Date Noted    Elevated creatine kinase 2023    Acetabulum fracture, right ([address]) 2023    Edema of left lower extremity 2023    Fall 2023    Weight loss 2023    Urinary retention 2023    Wrist fracture, closed, right, initial encounter 2023    Sundowning 2023    Hypertension 2023    Constipation 2023    Thrombocytopenia ([address]) 2023    Hypokalemia 2023    Right renal mass 2023    Alzheimer's dementia with behavioral disturbance ([address]) 10/31/2023    Status post vaginal hysterectomy, left salpingectomy, anterior repair 2020    Second degree uterine prolapse 2019    [place]-Walker grade 2 cystocele 2019      LOS (days): 2  Geometric Mean LOS (GMLOS) (days): 2.80  Days to GMLOS:0.8     OBJECTIVE:  Risk of Unplanned Readmission Score: 16.73         Current admission status: Inpatient   Preferred Pharmacy:   [id number] [facility], [address] [facility]   [address] [place] [address]  Phone: [phone] Fax: [phone]    Primary Care Provider: Kushal Paredes MD    Primary Insurance: Lucero Mayes [facility]  Secondary Insurance:     DISCHARGE DETAILS:    Discharge planning discussed with[de-identified]  Yarelis Del Castillo at bedside  [place] of Choice: Yes  Comments - [place] of Choice: [facility]  CM contacted family/caregiver?: Yes  Were Treatment Team discharge recommendations reviewed with patient/caregiver?: Yes  Did patient/caregiver verbalize understanding of patient care needs?: N/A- going to facility  Were patient/caregiver advised of the risks associated with not following Treatment Team discharge recommendations?: Yes    Contacts  Patient Contacts: Tarun  Relationship to Patient[de-identified] Family ()  Contact Method: In Person  Reason/Outcome: Continuity of Care, Emergency Contact, Referral, Discharge [address]         Is the patient interested in [facility] [address] at discharge?: No    DME Referral Provided  Referral made for DME?: No    Other Referral/Resources/Interventions Provided:  Interventions: SNF, Short Term Rehab  Referral Comments: CM notified by [facility] with [facility] that they will have a bed for patient tomorrow. CM met with patient and  at bedside to discuss.  agreeable to bed, stating he spoke with [facility] and notified him of this as well. Facility reserved in [address], d/c support attached and auth initiated. CM to follow up as able to continue with dcp.     Would you like to participate in our [address] service program?  : No - Declined    Treatment Team Recommendation: SNF, Short Term Rehab  Discharge Destination Plan[de-identified] SNF, Short Term Rehab  Transport at Discharge : BLS Ambulance

## 2023-12-07 NOTE — PROGRESS NOTES
Progress Note - Geriatric Medicine   [facility] Males 82 y.o. female MRN: [id number]  Unit/Bed#:  W -01 Encounter: [id number]      Assessment/Plan:  Urinary retention  Assessment & Plan  Voiding via straight catheterization   Continue urinary retention protocol   Continue bladder scans   Encourage PO hydration    Weight loss  Assessment & Plan   reports recent weight loss  Encourage adequate PO intake  Continue PO protein supplementation with every meal    Fall  Assessment & Plan  Patient uses no AD for ambulation at baseline   reports recent unwitnessed fall 12/03  Monitor orthostatic vital signs  Vitamin D3 54.7, Continue Vitamin D3 supplements  Encourage p.o. hydration  Avoid hypotension and hypoglycemia   [place] fall precautions   Assess patient frequently for physical needs, encourage use of assistant devices as needed and directed by PT/OT  Identify cognitive and physical deficits and behaviors that affect risk of falls  Consider moving patient closer to nursing station to monitor more closely for impulsive behavior which may increase risk of falls  Educate patient/family on patient safety including physical limitations and importance of using call bell for assistance   Modify environment to reduce risk of injury including disconnecting from pole when not in use, ensuring adequate lighting in room and restroom, ensuring that path to restroom is clear and free of trip hazards  PT/OT consulted to assist with strengthening/mobility and assist with discharge planning to appropriate level of care     Edema of left lower extremity  Assessment & Plan  Edema of LLE noted 12/05  Associated ecchymosis of LLE  Consider in the context of fall   Continue to monitor  Recommend b/l LE doppler    Elevated creatine kinase  Assessment & Plan  CK  12/05 is 471   Encourage fluids  Continue to monitor   Recheck CK level     Sundowning  Assessment & Plan   confirms sundowning daily at 4-5 pm Increased disorientation and hallucinations  No history of violent behaviors noted  Monitor for behaviors  Reorient as needed   Continue melatonin 3 mg PO QHS  Continue Gabapentin 100 mg TID , 0900, 1400, 2100  monitor EKG for Qtc  If behaviors occur, consider seroquel 12.5 mg PO 1400    Constipation  Assessment & Plan  BM today 12/07  History of constipation   Continue senna 2 tablets PO BID  Continue MiraLAX daily, noncompliant a this time    Alzheimer's dementia with behavioral disturbance ([address])  Assessment & Plan  Pt. Is AAOx2 at baseline   Currently AAOx1  Pt.  Lives at home with    Dependent for instrumental ADLs  Dependent for ADLs  Nonviolent confusion behaviors at baseline  Hallucinations at baseline  TSH wnl  B12 resulted 302  Start vitamin B12 1000 mcg PO daily  reorient as needed  Monitor for behaviors   Monitor for mental status change  Provide supportive care   monitor EKG for Qtc  If behaviors occur, consider seroquel 12.5 mg PO 1400    * Acetabulum fracture, right ([address])  Assessment & Plan  Mildly displaced right acetabulum fracture, present on ED admission 12/05  Patient denies pain at this time  Manage per orthopedics - non-operative at this time  Monitor for urinary retention    Acute encephalopathy-resolved as of 12/6/2023  Assessment & Plan  -Patient noted to have increased disorientation and hallucinations per    -Able to be reoriented   -Patient is high risk of delirium due to dementia at baseline, recent trauma, hospital admission  -Initiate delirium precautions  -maintain normal sleep/wake cycle- continue melatonin 3 mg QHS  -minimize overnight interruptions, group overnight vitals/labs/nursing checks as possible  -dim lights, close blinds and turn off tv to minimize stimulation and encourage sleep environment in evenings  -ensure that pain is well controlled, continue Tylenol 975mg Q8H scheduled   -monitor for fecal and urinary retention which may precipitate delirium, check bladder scan daily  -encourage early mobilization and ambulation  -provide frequent reorientation and redirection  -encourage family and friends at the bedside to help calm patient if anxious  -avoid medications which may precipitate or worsen delirium such as tramadol, benzodiazepine, anticholinergics, and antihistaminics  -encourage hydration and nutrition , assist with feeding if needed  -redirect unwanted behaviors as first line, avoid physical restraints. -TSH wnl  - B12 302  -Continue Gabapentin 100 mg PO TID 0900, 1400, 2100  - Seroquel 25 mg QHS resumed, monitor Qtc interval       Subjective:   Pt. was seen and evaluated at bedside for progress and is currently laying in bed in no signs of acute distress. AAOx1 , oriented to self only. Disoriented to time, place, and situation. She denies pain today said she is doing well overall. She tells me she slept good,  states she was still sleeping at 7am when he came. She also has been eating good all meals today and drinking protein supplements. Last BM today 12/07. No behaviors noted last night or today. Review of Systems   Unable to perform ROS: Dementia (Dementia)   Respiratory:  Negative for shortness of breath. Gastrointestinal:  Negative for abdominal pain. Musculoskeletal:  Negative for arthralgias. Objective:     Vitals: Blood pressure 113/52, pulse 79, temperature 98.2 °F (36.8 °C), resp. rate 18, height 5' 5" (1.651 m), weight 56.6 kg (124 lb 12.5 oz), SpO2 97 %, not currently breastfeeding. ,Body mass index is 20.76 kg/m². Intake/Output Summary (Last 24 hours) at 12/7/2023 1553  Last data filed at 12/6/2023 2112  Gross per 24 hour   Intake --   Output 364 ml   Net -364 ml       Current Medications: Reviewed    Physical Exam:   Physical Exam  Vitals and nursing note reviewed. Constitutional:       General: She is not in acute distress. Comments: Frail looking   HENT:      Head: Normocephalic and atraumatic.       Ears: Comments: [place]     Mouth/Throat:      Mouth: Mucous membranes are moist.   Eyes:      Conjunctiva/sclera: Conjunctivae normal.   Cardiovascular:      Rate and Rhythm: Normal rate and regular rhythm. Heart sounds: Murmur heard. Pulmonary:      Effort: Pulmonary effort is normal. No respiratory distress. Breath sounds: Normal breath sounds. Abdominal:      Palpations: Abdomen is soft. Tenderness: There is no abdominal tenderness. Musculoskeletal:         General: No swelling. Cervical back: Neck supple. Left lower leg: Edema (improved) present. Skin:     General: Skin is warm and dry. Neurological:      Mental Status: She is alert. She is disoriented. Comments: AAOx1   Psychiatric:         Mood and Affect: Mood normal.         Behavior: Behavior is cooperative. Cognition and Memory: Cognition is impaired. Memory is impaired. Invasive Devices       None                   Lab, Imaging and other studies: I have personally reviewed pertinent reports.

## 2023-12-07 NOTE — CASE MANAGEMENT
Case Management Discharge Planning Note    Patient name Yaima Bay  Location W /W -36 MRN [id number]  : 1940 Date 2023       Current Admission Date: 2023  Current Admission Diagnosis:Acetabulum fracture, right [facility])   Patient Active Problem List    Diagnosis Date Noted    Elevated creatine kinase 2023    Acetabulum fracture, right ([address]) 2023    Edema of left lower extremity 2023    Fall 2023    Weight loss 2023    Urinary retention 2023    Wrist fracture, closed, right, initial encounter 2023    Sundowning 2023    Hypertension 2023    Constipation 2023    Thrombocytopenia ([address]) 2023    Hypokalemia 2023    Right renal mass 2023    Alzheimer's dementia with behavioral disturbance ([address]) 10/31/2023    Status post vaginal hysterectomy, left salpingectomy, anterior repair 2020    Second degree uterine prolapse 2019    [place]-Walker grade 2 cystocele 2019      LOS (days): 2  Geometric Mean LOS (GMLOS) (days): 2.80  Days to GMLOS:0.7     OBJECTIVE:  Risk of Unplanned Readmission Score: 16.73         Current admission status: Inpatient   Preferred Pharmacy:   [id number] [facility], [address] [facility] [address]   [address] [place] [address]  Phone: [phone] Fax: [phone]    Primary Care Provider: Albert Chase MD    Primary Insurance: Alireza Engel [facility] REP  Secondary Insurance:     [address] Number: [id number]

## 2023-12-08 VITALS
OXYGEN SATURATION: 98 % | HEART RATE: 76 BPM | DIASTOLIC BLOOD PRESSURE: 66 MMHG | HEIGHT: 65 IN | RESPIRATION RATE: 19 BRPM | BODY MASS INDEX: 20.79 KG/M2 | SYSTOLIC BLOOD PRESSURE: 132 MMHG | WEIGHT: 124.78 LBS | TEMPERATURE: 97.8 F

## 2023-12-08 PROBLEM — E44.1 MILD PROTEIN-CALORIE MALNUTRITION (HCC): Status: ACTIVE | Noted: 2023-12-08

## 2023-12-08 LAB
CK SERPL-CCNC: 513 U/L (ref 26–192)
SARS-COV-2 RNA RESP QL NAA+PROBE: NEGATIVE

## 2023-12-08 PROCEDURE — 99238 HOSP IP/OBS DSCHRG MGMT 30/<: CPT | Performed by: STUDENT IN AN ORGANIZED HEALTH CARE EDUCATION/TRAINING PROGRAM

## 2023-12-08 PROCEDURE — NC001 PR NO CHARGE: Performed by: PHYSICIAN ASSISTANT

## 2023-12-08 PROCEDURE — 87635 SARS-COV-2 COVID-19 AMP PRB: CPT | Performed by: PHYSICIAN ASSISTANT

## 2023-12-08 RX ORDER — ACETAMINOPHEN 325 MG/1
975 TABLET ORAL EVERY 8 HOURS SCHEDULED
Refills: 0
Start: 2023-12-08

## 2023-12-08 RX ORDER — QUETIAPINE FUMARATE 25 MG/1
25 TABLET, FILM COATED ORAL
Refills: 0
Start: 2023-12-08

## 2023-12-08 RX ORDER — POLYETHYLENE GLYCOL 3350 17 G/17G
17 POWDER, FOR SOLUTION ORAL DAILY
Refills: 0
Start: 2023-12-09

## 2023-12-08 RX ORDER — GABAPENTIN 100 MG/1
100 CAPSULE ORAL 3 TIMES DAILY
Refills: 0
Start: 2023-12-08

## 2023-12-08 RX ADMIN — ACETAMINOPHEN 975 MG: 325 TABLET, FILM COATED ORAL at 13:57

## 2023-12-08 RX ADMIN — SENNOSIDES AND DOCUSATE SODIUM 1 TABLET: 8.6; 5 TABLET ORAL at 08:02

## 2023-12-08 RX ADMIN — GABAPENTIN 100 MG: 100 CAPSULE ORAL at 13:57

## 2023-12-08 RX ADMIN — GABAPENTIN 100 MG: 100 CAPSULE ORAL at 08:02

## 2023-12-08 RX ADMIN — FELODIPINE 10 MG: 2.5 TABLET, FILM COATED, EXTENDED RELEASE ORAL at 08:02

## 2023-12-08 RX ADMIN — POLYETHYLENE GLYCOL 3350 17 G: 17 POWDER, FOR SOLUTION ORAL at 08:02

## 2023-12-08 RX ADMIN — ENOXAPARIN SODIUM 30 MG: 30 INJECTION SUBCUTANEOUS at 09:02

## 2023-12-08 RX ADMIN — ACETAMINOPHEN 975 MG: 325 TABLET, FILM COATED ORAL at 05:03

## 2023-12-08 NOTE — INCIDENTAL FINDINGS
The following findings require follow up:  Radiographic finding   Findin.3cm hypodense lesion right hepatic lobe, multiple small right renal cysts    Follow up required: RUQ or abdominal US   Follow up should be done within 1 month(s)    I personally discussed these findings with the patient's  present at bedside. He reports understanding of the findings and that the hepatic lesion was present on a previous ct scan in  and was characterized as a cyst at that time. It does appear to have changed since that time which could be representative of movement during the CT scan or changes to the lesion raising the concern for hepatic cancer. We discussed the finding of small right renal cysts which were also seen on previous ct scans, and previously described as "cortical bulging". She was recommended for MRI at that time. He understands the recommendation for US of the liver and kidney to better evaluate the hepatic lesion and the kidney cysts and that should this represent an early finding of cancer delay in follow up could result in worsening prognosis and/or death.

## 2023-12-08 NOTE — ASSESSMENT & PLAN NOTE
- History of dementia  - Lives with  at independent living   - Takes Seroquel 25 mg QHS , monitor QTC  - Geriatric medicine consultation and recommendations appreciated

## 2023-12-08 NOTE — NURSING NOTE
Report given to receiving facility [facility] via phone. Transport team picked up at 1400 no issues [facility].  Report given to transport team.

## 2023-12-08 NOTE — CASE MANAGEMENT
Case Management Discharge Planning Note    Patient name Mónica Dominguez  Location W /W -85 MRN [id number]  : 1940 Date 2023       Current Admission Date: 2023  Current Admission Diagnosis:Acetabulum fracture, right [facility])   Patient Active Problem List    Diagnosis Date Noted    Mild protein-calorie malnutrition ([address]) 2023    Elevated creatine kinase 2023    Acetabulum fracture, right ([address]) 2023    Edema of left lower extremity 2023    Fall 2023    Weight loss 2023    Urinary retention 2023    Wrist fracture, closed, right, initial encounter 2023    Sundowning 2023    Hypertension 2023    Constipation 2023    Thrombocytopenia ([address]) 2023    Hypokalemia 2023    Right renal mass 2023    Alzheimer's dementia with behavioral disturbance ([address]) 10/31/2023    Status post vaginal hysterectomy, left salpingectomy, anterior repair 2020    Second degree uterine prolapse 2019    [place] grade 2 cystocele 2019      LOS (days): 3  Geometric Mean LOS (GMLOS) (days): 3.90  Days to GMLOS:0.9     OBJECTIVE:  Risk of Unplanned Readmission Score: 16.63         Current admission status: Inpatient   Preferred Pharmacy:   [id number] [id number] [facility], [address] [address] [address]   [address] [place] [address]  Phone: [phone] Fax: [phone]    Primary Care Provider: Mamadou Villafana MD    Primary Insurance: [facility]  Secondary Insurance:     DISCHARGE DETAILS:    Discharge planning discussed with[de-identified]  Ty Denis at bedside  Freedom of Choice: Yes  Comments - [place] of Choice: S  CM contacted family/caregiver?: Yes  Were Treatment Team discharge recommendations reviewed with patient/caregiver?: Yes  Did patient/caregiver verbalize understanding of patient care needs?: N/A- going to facility  Were patient/caregiver advised of the risks associated with not following Treatment Team discharge recommendations?: Yes    Contacts  Patient Contacts: Ty Adeel  Relationship to Patient[de-identified] Family ()  Contact Method: In Person  Reason/Outcome: Continuity of Care, Emergency Contact, Referral, Discharge [address]         Is the patient interested in [facility] at discharge?: No    DME Referral Provided  Referral made for DME?: No    Other Referral/Resources/Interventions Provided:  Interventions: SNF, Short Term Rehab  Referral Comments: CM notified by d/c support that auth was approved for patient- info forwarded in [address] CM met with  at bedside-  aware and agreeable to dcp to facility today. 2:00PM BLS confirmed, all appropriate parties aware- med necessity placed in binder on W4. PASRR attached to AIDIN, COVID swab and CK lab pending per facility request. No further CM needs anticipated at this time. Would you like to participate in our [facility] service program?  : No - Declined    Treatment Team Recommendation: SNF, Short Term Rehab  Discharge Destination Plan[de-identified] SNF, Short Term Rehab  Transport at Discharge : BLS Ambulance  Transported by [facility] and Unit #):  HANSEL      IMM Given (Date):: 12/08/23  IMM Given to[de-identified] Family (reviewed with )    [address] [address] [address] Name, [facility] : [address] [place] [address]  Receiving Facility/Agency Phone Number: [phone]  Facility/Agency Fax Number: [phone]

## 2023-12-08 NOTE — PLAN OF CARE
Problem: Potential for Falls  Goal: Patient will remain free of falls  Description: INTERVENTIONS:  - Educate patient/family on patient safety including physical limitations  - Instruct patient to call for assistance with activity   - Consult OT/PT to assist with strengthening/mobility   - Keep Call bell within reach  - Keep bed low and locked with side rails adjusted as appropriate  - Keep care items and personal belongings within reach  - Initiate and maintain comfort rounds  - Make Fall Risk Sign visible to staff  - Offer Toileting every 2 Hours, in advance of need  - Initiate/Maintain bed alarm  - Obtain necessary fall risk management equipment:   - Apply yellow socks and bracelet for high fall risk patients  - Consider moving patient to room near nurses station  Outcome: Progressing     Problem: Prexisting or High Potential for Compromised Skin Integrity  Goal: Skin integrity is maintained or improved  Description: INTERVENTIONS:  - Identify patients at risk for skin breakdown  - Assess and monitor skin integrity  - Assess and monitor nutrition and hydration status  - Monitor labs   - Assess for incontinence   - Turn and reposition patient  - Assist with mobility/ambulation  - Relieve pressure over bony prominences  - Avoid friction and shearing  - Provide appropriate hygiene as needed including keeping skin clean and dry  - Evaluate need for skin moisturizer/barrier cream  - Collaborate with interdisciplinary team   - Patient/family teaching  - Consider wound care consult   Outcome: Progressing     Problem: PAIN - ADULT  Goal: Verbalizes/displays adequate comfort level or baseline comfort level  Description: Interventions:  - Encourage patient to monitor pain and request assistance  - Assess pain using appropriate pain scale  - Administer analgesics based on type and severity of pain and evaluate response  - Implement non-pharmacological measures as appropriate and evaluate response  - Consider cultural and social influences on pain and pain management  - Notify physician/advanced practitioner if interventions unsuccessful or patient reports new pain  Outcome: Progressing     Problem: INFECTION - ADULT  Goal: Absence or prevention of progression during hospitalization  Description: INTERVENTIONS:  - Assess and monitor for signs and symptoms of infection  - Monitor lab/diagnostic results  - Monitor all insertion sites, i.e. indwelling lines, tubes, and drains  - Monitor endotracheal if appropriate and nasal secretions for changes in amount and color  - [place] appropriate cooling/warming therapies per order  - Administer medications as ordered  - Instruct and encourage patient and family to use good hand hygiene technique  - Identify and instruct in appropriate isolation precautions for identified infection/condition  Outcome: Progressing  Goal: Absence of fever/infection during neutropenic period  Description: INTERVENTIONS:  - Monitor WBC    Outcome: Progressing     Problem: SAFETY ADULT  Goal: Patient will remain free of falls  Description: INTERVENTIONS:  - Educate patient/family on patient safety including physical limitations  - Instruct patient to call for assistance with activity   - Consult OT/PT to assist with strengthening/mobility   - Keep Call bell within reach  - Keep bed low and locked with side rails adjusted as appropriate  - Keep care items and personal belongings within reach  - Initiate and maintain comfort rounds  - Make Fall Risk Sign visible to staff  - Offer Toileting every 2 Hours, in advance of need  - Initiate/Maintain bed alarm  - Obtain necessary fall risk management equipment:   - Apply yellow socks and bracelet for high fall risk patients  - Consider moving patient to room near nurses station  Outcome: Progressing  Goal: Maintain or return to baseline ADL function  Description: INTERVENTIONS:  -  Assess patient's ability to carry out ADLs; assess patient's baseline for ADL function and identify physical deficits which impact ability to perform ADLs (bathing, care of mouth/teeth, toileting, grooming, dressing, etc.)  - Assess/evaluate cause of self-care deficits   - Assess range of motion  - Assess patient's mobility; develop plan if impaired  - Assess patient's need for assistive devices and provide as appropriate  - Encourage maximum independence but intervene and supervise when necessary  - Involve family in performance of ADLs  - Assess for home care needs following discharge   - Consider OT consult to assist with ADL evaluation and planning for discharge  - Provide patient education as appropriate  Outcome: Progressing  Goal: Maintains/Returns to pre admission functional level  Description: INTERVENTIONS:  - Perform AM-PAC 6 Click Basic Mobility/ Daily Activity assessment daily.  - Set and communicate daily mobility goal to care team and patient/family/caregiver. - Collaborate with rehabilitation services on mobility goals if consulted  - Perform Range of Motion 2 times a day. - Reposition patient every 2 hours.   - Dangle patient 2 times a day  - Stand patient 2 times a day  - Ambulate patient 2 times a day  - Out of bed to chair 2 times a day   - Out of bed for meals 2 times a day  - Out of bed for toileting  - Record patient progress and toleration of activity level   Outcome: Progressing     Problem: DISCHARGE PLANNING  Goal: Discharge to home or other facility with appropriate resources  Description: INTERVENTIONS:  - Identify barriers to discharge w/patient and caregiver  - Arrange for needed discharge resources and transportation as appropriate  - Identify discharge learning needs (meds, wound care, etc.)  - Arrange for interpretive services to assist at discharge as needed  - Refer to Case Management Department for coordinating discharge planning if the patient needs post-hospital services based on physician/advanced practitioner order or complex needs related to functional status, cognitive ability, or social support system  Outcome: Progressing     Problem: Knowledge Deficit  Goal: Patient/family/caregiver demonstrates understanding of disease process, treatment plan, medications, and discharge instructions  Description: Complete learning assessment and assess knowledge base. Interventions:  - Provide teaching at level of understanding  - Provide teaching via preferred learning methods  Outcome: Progressing     Problem: Nutrition/Hydration-ADULT  Goal: Nutrient/Hydration intake appropriate for improving, restoring or maintaining nutritional needs  Description: Monitor and assess patient's nutrition/hydration status for malnutrition. Collaborate with interdisciplinary team and initiate plan and interventions as ordered. Monitor patient's weight and dietary intake as ordered or per policy. Utilize nutrition screening tool and intervene as necessary. Determine patient's food preferences and provide high-protein, high-caloric foods as appropriate.      INTERVENTIONS:  - Monitor oral intake, urinary output, labs, and treatment plans  - Assess nutrition and hydration status and recommend course of action  - Evaluate amount of meals eaten  - Assist patient with eating if necessary   - Allow adequate time for meals  - Recommend/ encourage appropriate diets, oral nutritional supplements, and vitamin/mineral supplements  - Order, calculate, and assess calorie counts as needed  - Recommend, monitor, and adjust tube feedings and TPN/PPN based on assessed needs  - Assess need for intravenous fluids  - Provide specific nutrition/hydration education as appropriate  - Include patient/family/caregiver in decisions related to nutrition  Outcome: Progressing

## 2023-12-08 NOTE — PLAN OF CARE
Problem: SAFETY ADULT  Goal: Patient will remain free of falls  Description: INTERVENTIONS:  - Educate patient/family on patient safety including physical limitations  - Instruct patient to call for assistance with activity   - Consult OT/PT to assist with strengthening/mobility   - Keep Call bell within reach  - Keep bed low and locked with side rails adjusted as appropriate  - Keep care items and personal belongings within reach  - Initiate and maintain comfort rounds  - Make Fall Risk Sign visible to staff  - Offer Toileting every 2 Hours, in advance of need  - Initiate/Maintain bed alarm, increased comfort rounds  - Obtain necessary fall risk management equipment: alarms, non-slip yellow socks  - Apply yellow socks and bracelet for high fall risk patients  - Consider moving patient to room near nurses station  Outcome: Progressing     Problem: SAFETY ADULT  Goal: Maintain or return to baseline ADL function  Description: INTERVENTIONS:  -  Assess patient's ability to carry out ADLs; assess patient's baseline for ADL function and identify physical deficits which impact ability to perform ADLs (bathing, care of mouth/teeth, toileting, grooming, dressing, etc.)  - Assess/evaluate cause of self-care deficits   - Assess range of motion  - Assess patient's mobility; develop plan if impaired  - Assess patient's need for assistive devices and provide as appropriate  - Encourage maximum independence but intervene and supervise when necessary  - Involve family in performance of ADLs  - Assess for home care needs following discharge   - Consider OT consult to assist with ADL evaluation and planning for discharge  - Provide patient education as appropriate  Outcome: Progressing     Problem: PAIN - ADULT  Goal: Verbalizes/displays adequate comfort level or baseline comfort level  Description: Interventions:  - Encourage patient to monitor pain and request assistance  - Assess pain using appropriate pain scale  - Administer Continue Regimen: Triamcinolone 0.1% cream. Apply to affected areas of trunk and legs twice a day two weeks on one week off then as needed\\n\\nFluocinonide 0.05 % topical solution: Apply to affected area of scalp twice a day two weeks on one week off. \\n\\nPrednisone 5mg: Take one tab once a day (Prescribed by Dr. Floresita Stahl) analgesics based on type and severity of pain and evaluate response  - Implement non-pharmacological measures as appropriate and evaluate response  - Consider cultural and social influences on pain and pain management  - Notify physician/advanced practitioner if interventions unsuccessful or patient reports new pain  Outcome: Progressing     Problem: DISCHARGE PLANNING  Goal: Discharge to home or other facility with appropriate resources  Description: INTERVENTIONS:  - Identify barriers to discharge w/patient and caregiver  - Arrange for needed discharge resources and transportation as appropriate  - Identify discharge learning needs (meds, wound care, etc.)  - Arrange for interpretive services to assist at discharge as needed  - Refer to Case Management Department for coordinating discharge planning if the patient needs post-hospital services based on physician/advanced practitioner order or complex needs related to functional status, cognitive ability, or social support system  Outcome: Progressing Detail Level: Zone

## 2023-12-08 NOTE — DISCHARGE SUMMARY
[address]  Discharge- Oskar Steele 1940, 82 y.o. female MRN: [id number]  Unit/Bed#: W -30 Encounter: [id number]  Primary Care Provider: Giuliana Schaeffer MD   Date and time admitted to hospital: 12/5/2023  6:57 AM    Fall  Assessment & Plan  - Status post fall with the below noted injuries. - Fall precautions. - Geriatric Medicine consultation for evaluation, medication review and recommendations.  - PT and OT evaluation and treatment as indicated. - Case Management consultation for disposition planning. * Acetabulum fracture, right ([address])  Assessment & Plan  - Mildly displaced right acetabulum fracture, present on admission  - Status post fall on Saturday, 12/2. - Appreciate Orthopedic surgery evaluation, recommendations and interventions as noted. - Non operative management  - Maintain WBAT RLE   - Monitor right lower extremity neurovascular exam.  - Continue multimodal analgesic regimen.  - Continue DVT prophylaxis. - PT and OT evaluation and treatment as indicated. - Outpatient follow up with Orthopedic surgery for re-evaluation. Mild protein-calorie malnutrition ([address])  Assessment & Plan  Malnutrition Findings:   Adult Malnutrition type: Acute illness (in the setting of chronic illness)  Adult Degree of Malnutrition: Malnutrition of mild degree  Malnutrition Characteristics: Fat loss, Muscle loss, Weight loss                  360 Statement: Mild malnutrition related to progression of disease as evidenced by loss of subcutaneous fat and muscle, osseous, extremities, 16.3% weight decrease x < 1 year. Currently treated with oral supplementation. BMI Findings: Body mass index is 20.76 kg/m². Wrist fracture, closed, right, initial encounter  Assessment & Plan  - Possible right wrist fracture on XR with overlying ecchymosis, present on admission.  - Status post fall on 12/2.   - Appreciate Orthopedic surgery evaluation, recommendations and interventions as noted. - Likely subacute/ chronic. Non operative management  - Maintain WBAT RUE  - Monitor right upper extremity neurovascular exam.  - Continue multimodal analgesic regimen.  - Continue DVT prophylaxis. - PT and OT evaluation and treatment as indicated. - Outpatient follow up with Orthopedic surgery for re-evaluation. Constipation  Assessment & Plan  - Increase bowel regimen, last BM was Saturday 11/25  - Bowel movement with tap water enema     Alzheimer's dementia with behavioral disturbance ([address])  Assessment & Plan  - History of dementia  - Lives with  at independent living   - Takes Seroquel 25 mg QHS , monitor QTC  - Geriatric medicine consultation and recommendations appreciated              Medical Problems       Resolved Problems  Date Reviewed: 12/7/2023            Resolved    Acute encephalopathy 12/6/2023     Resolved by  Salvador Hart          Admission Date:   Admission Orders (From admission, onward)       Ordered        12/05/23 0926  Inpatient Admission  Once                            Admitting Diagnosis: Hypokalemia [E87.6]  Alzheimer's dementia with behavioral disturbance ([address]) [G30.9, F02.818]  Acetabular fracture ([address]) [S32.409A]  Distal radius fracture [S52.509A]  Liver lesion [K76.9]  Fall in elderly patient [R29.6]    HPI: From H&P by Susan Casey PA-C on 12/5: "Svetlana Jiang is a 82 y.o. female who presents after a fall 3 days prior. Patient has history of dementia and is a poor historian,  who she lives with is at bedside.  reports he heard the patient fall in the bedroom and went directly to her side, she was on her right side. He feels she did not hit her head or lose consciousness, and he reports she does not take any AC/AP medications. He reports he was able to get the patient up and into bed, and he reports she had pain with going to the bathroom ever since and hasn't been getting out of bed much because of the pain.  He reports that when she's in bed, she has no pain. He also reports she has been more confused over the past 24 hrs. "    Procedures Performed:   Orders Placed This Encounter   Procedures    Splint application       Summary of Hospital Course: Roma Mendoza is an 83 yo female who [facility] 3 days after a mechanical fall at home. Her  is her care taker and became concerned when she complained of pain while going to the bathroom and was unable to get out of bed like usual. She was found to have a right acetabular fracture. Ortho was consulted and recommended non operative treatment and weight bearing as tolerated on the right lower extremity. The patient also had an abnormal appearing right wrist xray and upon review with orthopedics it was determined that the fracture was subacute/chronic and she was placed in a wrist splint with removal for ROM with PT/OT. The patient was medically stable on 12/8 and was discharged to [facility]. Significant Findings, Care, Treatment and Services Provided: Right acetabular fracture, ortho consult, non operative management    Complications: None    Condition at Discharge: good         Discharge instructions/Information to patient and family:   See after visit summary for information provided to patient and family. Provisions for Follow-Up Care:  See after visit summary for information related to follow-up care and any pertinent home health orders. PCP: Milton Harkins MD    Disposition:  [facility]    Planned Readmission: No    Discharge Statement   I spent 20 minutes discharging the patient. This time was spent on the day of discharge. I had direct contact with the patient on the day of discharge. Additional documentation is required if more than 30 minutes were spent on discharge. Discharge Medications:  See after visit summary for reconciled discharge medications provided to patient and family.

## 2023-12-08 NOTE — ASSESSMENT & PLAN NOTE
Malnutrition Findings:   Adult Malnutrition type: Acute illness (in the setting of chronic illness)  Adult Degree of Malnutrition: Malnutrition of mild degree  Malnutrition Characteristics: Fat loss, Muscle loss, Weight loss                  360 Statement: Mild malnutrition related to progression of disease as evidenced by loss of subcutaneous fat and muscle, osseous, extremities, 16.3% weight decrease x < 1 year. Currently treated with oral supplementation. BMI Findings: Body mass index is 20.76 kg/m².

## 2023-12-08 NOTE — UTILIZATION REVIEW
NOTIFICATION OF ADMISSION DISCHARGE   This is a Notification of Discharge from [address] [facility]. Please be advised that this patient has been discharge from our facility. Below you will find the admission and discharge date and time including the patient’s disposition. UTILIZATION REVIEW CONTACT:  Magdi Yu  Utilization   Network Utilization Review Department  Phone: [phone] x carefully listen to the prompts. All voicemails are confidential.  Email: [email or web address]     ADMISSION INFORMATION  PRESENTATION DATE: 12/5/2023  6:57 AM  OBERVATION ADMISSION DATE:   INPATIENT ADMISSION DATE: 12/5/23  9:26 AM   DISCHARGE DATE: 12/8/2023  2:10 PM   DISPOSITION:[facility] SNF/TCU/SNU    Network Utilization Review Department  ATTENTION: Please call with any questions or concerns to [phone] and carefully listen to the prompts so that you are directed to the right person. All voicemails are confidential.   For Discharge needs, contact Care Management DC Support Team at [phone] opt. 2  Send all requests for admission clinical reviews, approved or denied determinations and any other requests to dedicated fax number below belonging to the campus where the patient is receiving treatment.  List of dedicated fax numbers for the Facilities:  [place] DENIALS (Administrative/Medical Necessity) [phone]   DISCHARGE SUPPORT TEAM (Network) [phone] [address] [facility] (Maternity/NICU/Pediatrics) [phone] [address] [facility] [address] [facility] [address] [place] [address] [address]  [facility] [phone]

## 2023-12-08 NOTE — TELEPHONE ENCOUNTER
Merari Stinson, DO  You19 minutes ago (3:31 PM)     [address] [facility] [address] [facility]  She might need to be evaluate by psychiatry at this point, if her psych symptoms are becoming this bad. She may also require placement as well.

## 2023-12-08 NOTE — PROGRESS NOTES
[address] [facility]  Progress Note  Name: Michelle Aly  MRN: [id number]  Unit/Bed#: W -01 I Date of Admission: 12/5/2023   Date of Service: 12/8/2023 I Hospital Day: 3    Assessment/Plan   Fall  Assessment & Plan  - Status post fall with the below noted injuries. - Fall precautions. - Geriatric Medicine consultation for evaluation, medication review and recommendations.  - PT and OT evaluation and treatment as indicated. - Case Management consultation for disposition planning. * Acetabulum fracture, right ([address])  Assessment & Plan  - Mildly displaced right acetabulum fracture, present on admission  - Status post fall on Saturday, 12/2. - Appreciate Orthopedic surgery evaluation, recommendations and interventions as noted. - Non operative management  - Maintain WBAT RLE   - Monitor right lower extremity neurovascular exam.  - Continue multimodal analgesic regimen.  - Continue DVT prophylaxis. - PT and OT evaluation and treatment as indicated. - Outpatient follow up with Orthopedic surgery for re-evaluation. Wrist fracture, closed, right, initial encounter  Assessment & Plan  - Possible right wrist fracture on XR with overlying ecchymosis, present on admission.  - Status post fall on 12/2. - Appreciate Orthopedic surgery evaluation, recommendations and interventions as noted. - Likely subacute/ chronic. Non operative management  - Maintain WBAT RUE  - Monitor right upper extremity neurovascular exam.  - Continue multimodal analgesic regimen.  - Continue DVT prophylaxis. - PT and OT evaluation and treatment as indicated. - Outpatient follow up with Orthopedic surgery for re-evaluation.       Constipation  Assessment & Plan  - Increase bowel regimen, last BM was Saturday 11/25  - Bowel movement with tap water enema     Alzheimer's dementia with behavioral disturbance ([address])  Assessment & Plan  - History of dementia  - Lives with  at independent living   - [address] [facility] Seroquel 25 mg QHS , monitor QTC  - Geriatric medicine consultation and recommendations appreciated       Bowel Regimen: Miralax, Senokot, Enema  VTE Prophylaxis:Enoxaparin (Lovenox)     Disposition: Medically stable for discharge to [facility] when bed available - facility requesting repeat CK     Subjective   Chief Complaint: No complaints    Subjective: Patient reports she feels good,  is present bedside and offers no complaints. Patient is tolerating diet and had bowel movement with enema on 12/7     Objective   Vitals:   Temp:  [98 °F (36.7 °C)-98.3 °F (36.8 °C)] 98.3 °F (36.8 °C)  HR:  [63-89] 63  Resp:  [12-18] 16  BP: (105-122)/(48-69) 105/48    I/O         12/06 0701  12/07 0700 12/07 0701  12/08 0700 12/08 0701  12/09 0700    IV Piggyback       Total Intake(mL/kg)       Urine (mL/kg/hr) 364 (0.3)      Total Output 364      Net -364                      Physical Exam:   Gen: No acute distress resting comfortably in bed  Neuro: AAOx1, GCS 15, no focal neurodeficit  HEENT: PERRLA, EOMI, mucous membranes moist  Cards: RRR, S1, S2 without murmur rub or gallop  Pulm: Clear to auscultation bilaterally without wheezes rales or rhonchi  GI: Soft, nontender, nondistended  : Voiding independently  MSK: Extremities nontender without deformity  Skin: Warm, dry, intact      Invasive Devices       None                         Lab Results: BMP/CMP: No results found for: "SODIUM", "K", "CL", "CO2", "ANIONGAP", "BUN", "CREATININE", "GLUCOSE", "CALCIUM", "AST", "ALT", "ALKPHOS", "PROT", "BILITOT", "EGFR" and CBC: No results found for: "WBC", "HGB", "HCT", "MCV", "PLT", "ADJUSTEDWBC", "RBC", "MCH", "MCHC", "RDW", "MPV", "NRBC"  Imaging: I have personally reviewed pertinent reports.      Other Studies: none

## 2023-12-10 DIAGNOSIS — K59.00 CONSTIPATION: ICD-10-CM

## 2023-12-10 DIAGNOSIS — F41.9 ANXIETY: ICD-10-CM

## 2023-12-10 DIAGNOSIS — K62.89 ACUTE PROCTITIS: Primary | ICD-10-CM

## 2023-12-10 RX ORDER — AMOXICILLIN 250 MG
2 CAPSULE ORAL 2 TIMES DAILY
Qty: 60 TABLET | Refills: 0 | Status: SHIPPED | OUTPATIENT
Start: 2023-12-10 | End: 2023-12-25

## 2023-12-10 RX ORDER — LORAZEPAM 0.5 MG/1
0.25 TABLET ORAL EVERY 8 HOURS PRN
Qty: 3 TABLET | Refills: 0 | Status: SHIPPED | OUTPATIENT
Start: 2023-12-10 | End: 2023-12-12 | Stop reason: SDUPTHER

## 2023-12-11 ENCOUNTER — NURSING HOME VISIT (OUTPATIENT)
Dept: GERIATRICS | Facility: OTHER | Age: 83
End: 2023-12-11
Payer: COMMERCIAL

## 2023-12-11 DIAGNOSIS — S32.591D CLOSED FRACTURE OF RAMUS OF RIGHT PUBIS WITH ROUTINE HEALING, SUBSEQUENT ENCOUNTER: ICD-10-CM

## 2023-12-11 DIAGNOSIS — F02.818 ALZHEIMER'S DEMENTIA WITH BEHAVIORAL DISTURBANCE (HCC): ICD-10-CM

## 2023-12-11 DIAGNOSIS — K59.00 CONSTIPATION, UNSPECIFIED CONSTIPATION TYPE: ICD-10-CM

## 2023-12-11 DIAGNOSIS — R53.81 PHYSICAL DEBILITY: ICD-10-CM

## 2023-12-11 DIAGNOSIS — S62.101D CLOSED FRACTURE OF RIGHT WRIST WITH ROUTINE HEALING, SUBSEQUENT ENCOUNTER: ICD-10-CM

## 2023-12-11 DIAGNOSIS — S32.414D CLOSED NONDISPLACED FRACTURE OF ANTERIOR WALL OF RIGHT ACETABULUM WITH ROUTINE HEALING, SUBSEQUENT ENCOUNTER: Primary | ICD-10-CM

## 2023-12-11 DIAGNOSIS — I10 PRIMARY HYPERTENSION: ICD-10-CM

## 2023-12-11 DIAGNOSIS — D69.6 THROMBOCYTOPENIA (HCC): ICD-10-CM
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
## 2023-12-11 DIAGNOSIS — E44.1 MILD PROTEIN-CALORIE MALNUTRITION (HCC): ICD-10-CM

## 2023-12-11 DIAGNOSIS — E53.8 B12 DEFICIENCY: ICD-10-CM

## 2023-12-11 DIAGNOSIS — R33.9 URINARY RETENTION: ICD-10-CM

## 2023-12-11 DIAGNOSIS — G30.9 ALZHEIMER'S DEMENTIA WITH BEHAVIORAL DISTURBANCE (HCC): ICD-10-CM

## 2023-12-11 PROBLEM — S32.591A CLOSED FRACTURE OF RAMUS OF RIGHT PUBIS (HCC): Status: ACTIVE | Noted: 2023-12-11

## 2023-12-11 PROBLEM — S32.501D CLOSED NONDISPLACED FRACTURE OF RIGHT PUBIS WITH ROUTINE HEALING: Status: ACTIVE | Noted: 2023-12-11

## 2023-12-11 PROCEDURE — 99306 1ST NF CARE HIGH MDM 50: CPT | Performed by: FAMILY MEDICINE

## 2023-12-11 RX ORDER — QUETIAPINE FUMARATE 25 MG/1
12.5 TABLET, FILM COATED ORAL DAILY
COMMUNITY

## 2023-12-11 NOTE — ASSESSMENT & PLAN NOTE
Was requiring intermittent straight cath inpatient per record review  Monitor UOP  Treatment of constipation- chronic issue for patient likely exacerbating urinary retention  BMP ordered; low threshold to check PVR if uptrend in creatinine noted

## 2023-12-11 NOTE — ASSESSMENT & PLAN NOTE
Encourage PO hydration  Continue senna-s and miralax; hold for loose stool  TSH checked inpatient and low normal

## 2023-12-11 NOTE — ASSESSMENT & PLAN NOTE
Evaluated by orthopedics inpatient, felt to be subacute/chronic  Pain control as outlined above  WBAT with splint to R wrist  Follow up with orthopedics as scheduled

## 2023-12-11 NOTE — ASSESSMENT & PLAN NOTE
Monitor for constipation and urinary retention- may contribute to agitated behaviors  Delirium precautions  Continue seroquel 25mg QHS (home medication); with consistent agitated behaviors despite redirection and reassurance, will add dose of seroquel 12,5mg in the AM- goal for short term use only and to discontinue in 48-72h. EKG performed inpatient, QTc within normal range  If continued with no pain complaints or nonverbal signs of pain, will reduce frequency of gabapentin to BID x3 days then QHS x3 days then stop.  This is a newly started medication from the hospital  Staff reporting effectiveness of PRN lorazepam but should not use in the long term setting; goal to optimize medications as above and reduce and discontinue lorazepam over next 48-72h  Allow for time to adjust to facility as new environment and faces, does well when family visits

## 2023-12-11 NOTE — ASSESSMENT & PLAN NOTE
In setting of fall with pelvic fractures as above  Admit to SNF for rehab  PT and OT consults placed- evaluate and treat  Supportive care, nutritional support, ADL support  Fall precautions   Management of acute and chronic medical conditions as outlined

## 2023-12-11 NOTE — ASSESSMENT & PLAN NOTE
Minimally angulated fractures of the right pubic bone body, right inferior pubic ramus and anterior right acetabulum s/p fall  Nonoperative management  Continue scheduled acetaminophen with consideration for gabapentin wean as below  WBAT to RLE  Follow up with orthopedics as scheduled

## 2023-12-11 NOTE — PROGRESS NOTES
[address] [facility]  History and Physical  POS: SNF-31    Records Reviewed include: [address] [place] [address] records  Unable to obtain from patient due to: Dementia; history obtained speaking with nursing and medical record review    Chief Complaint/ Reason for Admission: Fall; R wrist fracture; R acetabular fracture    History of Present Illness:            82year old female admitted for SNF rehab following hospitalization at [address] [place] [address]. Presented following a fall with right sided pain; imaging significant for acute fractures of the right acetabulum, pubic bone and inferior pubic ramus. Also found to have chronic R radial fracture. Evaluated by orthopedics with plan for nonoperative intervention. No pain complaints reported since SNF admission. Current regimen includes scheduled acetaminophen and Gabapentin 100mg TID which was newly started inpatient. Agitated behaviors outpatient prior to hospitalization per record review, patient following with neurology. Was on seroquel 25mg QHS outpatient prior to hospitalization. Receiving PRN lorazepam with reported effectiveness since SNF admission due to agitated behaviors reported (primarily verbal). See A&P for additional HPI.         Allergies   Allergen Reactions    Sulfa Antibiotics      Doesn't remember reaction        Past Medical History  Past Medical History:   Diagnosis Date    Altered mental status 11/01/2023    Constipation 11/01/2023    Dementia ([address])     Hypertension         Past Surgical History:   Procedure Laterality Date    COLONOSCOPY      DILATION AND CURETTAGE OF UTERUS      ID ANTERIOR COLPORRAPHY RPR CYSTOCELE W/CYSTO N/A 1/21/2020    Procedure: COLPORRHAPHY;  Surgeon: Tex Jenkins MD;  Location: [facility];  Service: Gynecology    ID VAGINAL HYSTERECTOMY UTERUS 250 GM/< N/A 1/21/2020    Procedure: HYSTERECTOMY VAGINAL TOTAL (TVH) left salpingectomy;  Surgeon: Tex Jenkins MD;  Location: [facility]; Service: Gynecology    WRIST FRACTURE SURGERY Right          Social History  Tobacco Use: Low Risk  (12/11/2023)    Patient History     Smoking Tobacco Use: Never     Smokeless Tobacco Use: Never     Passive Exposure: Not on file           Physical Exam    Weight: 117.2lb Temp:97.1F BP:126/67 Pulse:82 Resp:18 O2 Sat:96%RA    Physical Exam  Vitals and nursing note reviewed. Constitutional:       General: She is awake. Appearance: She is not toxic-appearing or diaphoretic. Comments: Agitated with staff; cooperative with family   HENT:      Head: Normocephalic. Nose: No rhinorrhea. Mouth/Throat:      Mouth: Mucous membranes are moist.   Eyes:      General: No scleral icterus. Right eye: No discharge. Left eye: No discharge. Conjunctiva/sclera: Conjunctivae normal.   Pulmonary:      Effort: Pulmonary effort is normal. No respiratory distress. Musculoskeletal:      Cervical back: No rigidity. Skin:     Coloration: Skin is not jaundiced or pale. Neurological:      Mental Status: She is alert. Cranial Nerves: No dysarthria or facial asymmetry. Psychiatric:         Mood and Affect: Affect is labile. Speech: Speech normal.         Behavior: Behavior is uncooperative. Judgment: Judgment is impulsive. Review of Systems:  Review of Systems   Unable to perform ROS: Dementia   Constitutional:  Negative for fever. Respiratory:  Negative for cough. Genitourinary:  Negative for decreased urine volume. Musculoskeletal:  Negative for arthralgias. Psychiatric/Behavioral:  Positive for agitation and confusion.          List of Current Medications: Medication list reviewed and updated in Epic to reflect most current SNF orders    Labs/Diagnostics (reviewed by this provider): Hospital Paperwork  Lab Results   Component Value Date    CKTOTAL 513 (H) 12/06/2023     Lab Results   Component Value Date    WBC 7.29 12/06/2023    HGB 11.9 12/06/2023    HCT 35.7 12/06/2023    MCV 93 12/06/2023     (L) 12/06/2023      Lab Results   Component Value Date    SODIUM 139 12/06/2023    K 3.5 12/06/2023     12/06/2023    CO2 28 12/06/2023    BUN 27 (H) 12/06/2023    CREATININE 0.80 12/06/2023    GLUC 96 12/06/2023    CALCIUM 9.4 12/06/2023     Lab Results   Component Value Date    SRJPASIH67 302 12/05/2023     Lab Results   Component Value Date    ORX1DTICPXAV 0.687 12/05/2023        Imaging Reviewed:  CXR (12/5/23)   Xray R wrist (12/5/23) chronic distal radius fracture  CT abdomen/pelvis (12/5/23) Minimally angulated fractures of the R pubic bone body, R inferior pubic ramus, anterior R acetabulum   CT head/cervical spine (12/5/23)  EKG (12/6/23) QTc 455    Assessment/Plan:  82year old female with:    Acetabulum fracture, right ([address])  Minimally angulated fractures of the right pubic bone body, right inferior pubic ramus and anterior right acetabulum s/p fall  Nonoperative management  Continue scheduled acetaminophen with consideration for gabapentin wean as below  WBAT to RLE  Follow up with orthopedics as scheduled    Closed fracture of ramus of right pubis ([address])  See above    Alzheimer's dementia with behavioral disturbance ([address])  Monitor for constipation and urinary retention- may contribute to agitated behaviors  Delirium precautions  Continue seroquel 25mg QHS (home medication); with consistent agitated behaviors despite redirection and reassurance, will add dose of seroquel 12,5mg in the AM- goal for short term use only and to discontinue in 48-72h. EKG performed inpatient, QTc within normal range  If continued with no pain complaints or nonverbal signs of pain, will reduce frequency of gabapentin to BID x3 days then QHS x3 days then stop.  This is a newly started medication from the hospital  Staff reporting effectiveness of PRN lorazepam but should not use in the long term setting; goal to optimize medications as above and reduce and discontinue lorazepam over next 48-72h  Allow for time to adjust to facility as new environment and faces, does well when family visits    Thrombocytopenia ([address])  Chronic; B12 deficiency likely contributing- start on PO supplementation  CBC ordered    B12 deficiency  Start B12 1000mcg PO daily  Likely contributing to low platelets- CBC ordered    Mild protein-calorie malnutrition ([address])  Dietary/nutrition consult placed  Hines diet- encourage PO intake    Constipation  Encourage PO hydration  Continue senna-s and miralax; hold for loose stool  TSH checked inpatient and low normal    Hypertension  Goal <140-150/90  Continue felodipine  BMP ordered    Urinary retention  Was requiring intermittent straight cath inpatient per record review  Monitor UOP  Treatment of constipation- chronic issue for patient likely exacerbating urinary retention  BMP ordered; low threshold to check PVR if uptrend in creatinine noted    Physical debility  In setting of fall with pelvic fractures as above  Admit to SNF for rehab  PT and OT consults placed- evaluate and treat  Supportive care, nutritional support, ADL support  Fall precautions   Management of acute and chronic medical conditions as outlined    Closed fracture of right wrist  Evaluated by orthopedics inpatient, felt to be subacute/chronic  Pain control as outlined above  WBAT with splint to R wrist  Follow up with orthopedics as scheduled     Advanced Directives: POLST completed following SNF admission  Code status: Updated to DNR while at SNF  PCP: MD Spike Rivera,   12/11/23

## 2023-12-12 ENCOUNTER — NURSING HOME VISIT (OUTPATIENT)
Dept: GERIATRICS | Facility: OTHER | Age: 83
End: 2023-12-12
Payer: COMMERCIAL

## 2023-12-12 DIAGNOSIS — F02.818 ALZHEIMER'S DEMENTIA WITH BEHAVIORAL DISTURBANCE (HCC): Primary | ICD-10-CM

## 2023-12-12 DIAGNOSIS — S32.591D CLOSED FRACTURE OF RAMUS OF RIGHT PUBIS WITH ROUTINE HEALING, SUBSEQUENT ENCOUNTER: ICD-10-CM

## 2023-12-12 DIAGNOSIS — G30.9 ALZHEIMER'S DEMENTIA WITH BEHAVIORAL DISTURBANCE (HCC): Primary | ICD-10-CM

## 2023-12-12 DIAGNOSIS — S32.414D CLOSED NONDISPLACED FRACTURE OF ANTERIOR WALL OF RIGHT ACETABULUM WITH ROUTINE HEALING, SUBSEQUENT ENCOUNTER: ICD-10-CM

## 2023-12-12 DIAGNOSIS — F41.9 ANXIETY: ICD-10-CM

## 2023-12-12 PROCEDURE — 99309 SBSQ NF CARE MODERATE MDM 30: CPT | Performed by: FAMILY MEDICINE

## 2023-12-12 RX ORDER — LORAZEPAM 0.5 MG/1
0.25 TABLET ORAL 2 TIMES DAILY PRN
Qty: 3 TABLET | Refills: 0
Start: 2023-12-12

## 2023-12-12 NOTE — PROGRESS NOTES
[address] [facility]  Progress Note- Acute Visit  POS: SNF-31    Unable to obtain from patient due to: Dementia; history obtained speaking with nursing/ nursing note review  Chief Complaint/Reason for visit: Dementia with agitated behaviors  History of Present Illness: 82year old female evaluated for acute visit in setting of ongoing anxiety; agitated behaviors at times reported. Patient does not complain of pain. Review of systems: Review of Systems   Unable to perform ROS: Dementia   Gastrointestinal:  Negative for constipation. Genitourinary:  Negative for decreased urine volume. Psychiatric/Behavioral:  Positive for behavioral problems and confusion. Medications: Changes made- see written orders      Physical Exam    Weight: 120.8lb Temp:97.3F BP:145/80 Pulse:85 Resp:18 O2 Sat:98%RA    Physical Exam  Vitals and nursing note reviewed. Constitutional:       General: She is awake. She is not in acute distress. Appearance: She is well-groomed. She is not toxic-appearing or diaphoretic. Comments: Seated in wheelchair in hallway with activity board   HENT:      Head: Normocephalic. Nose: No rhinorrhea. Mouth/Throat:      Mouth: Mucous membranes are moist.   Eyes:      General: No scleral icterus. Right eye: No discharge. Left eye: No discharge. Conjunctiva/sclera: Conjunctivae normal.   Pulmonary:      Effort: Pulmonary effort is normal. No respiratory distress. Musculoskeletal:      Cervical back: No rigidity. Skin:     Coloration: Skin is not jaundiced or pale. Neurological:      Mental Status: She is alert. Mental status is at baseline. Psychiatric:         Behavior: Behavior is cooperative.        Assessment/Plan:  82year old female with:    Alzheimer's dementia with behavioral disturbance ([address])  With poor sleep and intermittent daytime agitation noted- does well with 1:1 interactions, interdisciplinary behavior team following to provide support and activities for patient  Continue seroquel 25mg QHS (home medication); with consistent agitated behaviors despite redirection and reassurance, started on seroquel 12.5mg in the AM with first dose 12/12- goal for short term use only of AM dose and to discontinue in 48-72h. EKG performed inpatient, QTc within normal range  If continued with no pain complaints or nonverbal signs of pain, and stable behaviors, will reduce frequency of gabapentin to BID x3 days then QHS x3 days then stop.  This is a newly started medication from the hospital  Some effectiveness of PRN lorazepam reported but should not use in the long term setting; goal to optimize medications as above and reduce and discontinue lorazepam over next 48-72h- reduce frequency to BID PRN on 12/12  Allow for time to adjust to facility as new environment and faces, does well when family visits  Monitor for constipation and urinary retention- may contribute to agitated behaviors  Delirium precautions    Acetabulum fracture, right ([address])  Minimally angulated fractures of the right pubic bone body, right inferior pubic ramus and anterior right acetabulum s/p fall  Nonoperative management  Continue scheduled acetaminophen with consideration for gabapentin wean as above  WBAT to RLE  Follow up with orthopedics as scheduled    Closed fracture of ramus of right pubis ([address])  See above     RACHEL Garber DO  12/12/23

## 2023-12-13 NOTE — ASSESSMENT & PLAN NOTE
With poor sleep and intermittent daytime agitation noted- does well with 1:1 interactions, interdisciplinary behavior team following to provide support and activities for patient  Continue seroquel 25mg QHS (home medication); with consistent agitated behaviors despite redirection and reassurance, started on seroquel 12.5mg in the AM with first dose 12/12- goal for short term use only of AM dose and to discontinue in 48-72h. EKG performed inpatient, QTc within normal range  If continued with no pain complaints or nonverbal signs of pain, and stable behaviors, will reduce frequency of gabapentin to BID x3 days then QHS x3 days then stop.  This is a newly started medication from the hospital  Some effectiveness of PRN lorazepam reported but should not use in the long term setting; goal to optimize medications as above and reduce and discontinue lorazepam over next 48-72h- reduce frequency to BID PRN on 12/12  Allow for time to adjust to facility as new environment and faces, does well when family visits  Monitor for constipation and urinary retention- may contribute to agitated behaviors  Delirium precautions

## 2023-12-13 NOTE — ASSESSMENT & PLAN NOTE
Minimally angulated fractures of the right pubic bone body, right inferior pubic ramus and anterior right acetabulum s/p fall  Nonoperative management  Continue scheduled acetaminophen with consideration for gabapentin wean as above  WBAT to RLE  Follow up with orthopedics as scheduled

## 2023-12-18 ENCOUNTER — NURSING HOME VISIT (OUTPATIENT)
Dept: GERIATRICS | Facility: OTHER | Age: 83
End: 2023-12-18
Payer: COMMERCIAL

## 2023-12-18 DIAGNOSIS — G30.9 ALZHEIMER'S DEMENTIA WITH BEHAVIORAL DISTURBANCE (HCC): ICD-10-CM

## 2023-12-18 DIAGNOSIS — F02.818 ALZHEIMER'S DEMENTIA WITH BEHAVIORAL DISTURBANCE (HCC): ICD-10-CM

## 2023-12-18 DIAGNOSIS — I10 PRIMARY HYPERTENSION: ICD-10-CM

## 2023-12-18 DIAGNOSIS — K59.00 CONSTIPATION, UNSPECIFIED CONSTIPATION TYPE: ICD-10-CM

## 2023-12-18 DIAGNOSIS — D69.6 THROMBOCYTOPENIA (HCC): ICD-10-CM

## 2023-12-18 DIAGNOSIS — S32.414D CLOSED NONDISPLACED FRACTURE OF ANTERIOR WALL OF RIGHT ACETABULUM WITH ROUTINE HEALING, SUBSEQUENT ENCOUNTER: ICD-10-CM

## 2023-12-18 DIAGNOSIS — E53.8 B12 DEFICIENCY: ICD-10-CM

## 2023-12-18 DIAGNOSIS — R53.81 PHYSICAL DEBILITY: ICD-10-CM

## 2023-12-18 DIAGNOSIS — S32.591D CLOSED FRACTURE OF RAMUS OF RIGHT PUBIS WITH ROUTINE HEALING, SUBSEQUENT ENCOUNTER: Primary | ICD-10-CM

## 2023-12-18 DIAGNOSIS — E44.1 MILD PROTEIN-CALORIE MALNUTRITION (HCC): ICD-10-CM

## 2023-12-18 DIAGNOSIS — S62.101D CLOSED FRACTURE OF RIGHT WRIST WITH ROUTINE HEALING, SUBSEQUENT ENCOUNTER: ICD-10-CM

## 2023-12-18 DIAGNOSIS — R33.9 URINARY RETENTION: ICD-10-CM

## 2023-12-18 PROCEDURE — 99316 NF DSCHRG MGMT 30 MIN+: CPT | Performed by: NURSE PRACTITIONER

## 2023-12-18 NOTE — PROGRESS NOTES
[facility] [address] [place]  [address]  [phone]    SNF Rehab: [facility]  Discharge Note     ASSESSMENT AND PLAN:    Acetabulum fracture, right (HCC)  Status post fall   Imaging reveals minimally angulated fractures of the right pubic bone body, right inferior pubic ramus and anterior right acetabulum s/p fall  Nonoperative management  Continue scheduled acetaminophen with consideration for gabapentin wean   WBAT to RLE  Follow up with orthopedics as scheduled     Closed fracture of ramus of right pubis (HCC)  See above     Alzheimer's dementia with behavioral disturbance (HCC)  Increased anxiety and agitated behaviors reported.   Continue home dose of  seroquel 25mg. Low dose of Seroquel added, 12.5 mg po in the am. Goal for short term use only and to discontinue once behaviors are controlled. EKG performed inpatient, QTc within normal range  Continue Lorazepam prn with GDR recommended in long term setting.      Thrombocytopenia (HCC)  Chronic  B12 deficiency likely contributing  Patient started on PO supplementation  Monitor CBC in the outpatient setting     B12 deficiency  Continue B12 1000mcg PO daily  Likely contributing to low platelets  Monitor CBC outpatient     Mild protein-calorie malnutrition (HCC)  [place] diet- encourage PO intake     Constipation  Stable  Continue to encourage PO hydration  Continue senna-s and miralax; hold for loose stool  TSH checked inpatient and low normal     Hypertension  Goal <140-150/90  BP stable and at goal   Continue felodipine  Monitor BMP outpatient     Urinary retention  Was requiring intermittent straight cath inpatient per record review  Continue to monitor UOP  Avoid constipation.   Monitor BMP outpatient; low threshold to check PVR if uptrend in creatinine noted     Physical debility  Multifactorial in setting of fall with pelvic fractures   Status post SNF rehab  Continue supportive care, nutritional support, ADL support  Maintain fall  precautions   Management of acute and chronic medical conditions as outlined     Closed fracture of right wrist  Patient evaluated by orthopedics inpatient, felt to be subacute/chronic  Continue pain control with Tylenol and gabapentin  WBAT with splint to R wrist  Follow up with orthopedics as scheduled outpatient  Advanced Directives: POLST completed following SNF admission  Code status: Updated to DNR while at SNF  PCP: Mauro Coronel MD          Name: Elsi Bo        : 1940              Sex: Female     HPI:    83 year old female patient seen and examined today for discharge planning.  She is status post hospitalization at [facility].  She presented after sustaining a fall with right-sided pain.  Imaging showed an acute fracture of the right acetabulum, pubic bone and inferior pubic ramus.  She was also found to have a chronic right radial fracture.  She was evaluated by orthopedics with plan for nonoperative intervention.  Once medically stable, she was discharged to SNF rehab to receive comprehensive rehab services.    Patient is ambulating 20 feet with hand hold assist and wheelchair to follow.  She is a mod assist for upper body ADLs and max assist for lower body and ADLs and max assist for toileting.  Patient has a history of advanced dementia and is unable to complete the MoCA today cognitive screening test.    The following portions of the patient's history were reviewed and updated as appropriate: allergies, current medications, past family history, past medical history, past social history, past surgical history and problem list.    ROS: Review of Systems   Unable to perform ROS: Dementia       Allergies   Allergen Reactions    Sulfa Antibiotics      Doesn't remember reaction       Medications:    Current Outpatient Medications on File Prior to Visit   Medication Sig Dispense Refill    acetaminophen (TYLENOL) 325 mg tablet Take 3 tablets (975 mg total) by mouth every 8 (eight)  hours  0    Ascorbic Acid (vitamin C) 100 MG tablet Take 100 mg by mouth daily      bisacodyl (DULCOLAX) 10 mg suppository Insert 1 suppository (10 mg total) into the rectum daily as needed for constipation 12 suppository 0    cholecalciferol (VITAMIN D3) 400 units tablet Take 400 Units by mouth daily      felodipine (PLENDIL) 10 MG 24 hr tablet Take 10 mg by mouth daily  3    gabapentin (NEURONTIN) 100 mg capsule Take 1 capsule (100 mg total) by mouth 3 (three) times a day  0    LORazepam (Ativan) 0.5 mg tablet Take 0.5 tablets (0.25 mg total) by mouth 2 (two) times a day as needed for anxiety (Every 8 hours prn anxiety times 3 doses.) (Patient taking differently: Take 0.25 mg by mouth every 8 (eight) hours as needed for anxiety (Every 8 hours prn anxiety times.)) 3 tablet 0    melatonin 3 mg Take 1 tablet (3 mg total) by mouth 2 hours prior to bedtime. 30 tablet 11    polyethylene glycol (MIRALAX) 17 g packet Take 17 g by mouth daily Do not start before December 9, 2023.  0    QUEtiapine (SEROquel) 25 mg tablet Take 1 tablet (25 mg total) by mouth daily at bedtime  0    QUEtiapine (SEROquel) 25 mg tablet Take 12.5 mg by mouth daily      senna-docusate sodium (SENOKOT S) 8.6-50 mg per tablet Take 2 tablets by mouth 2 (two) times a day for 15 days 60 tablet 0     No current facility-administered medications on file prior to visit.       History:  Past Medical History:   Diagnosis Date    Altered mental status 11/01/2023    Constipation 11/01/2023    Dementia (HCC)     Hypertension      Past Surgical History:   Procedure Laterality Date    COLONOSCOPY      DILATION AND CURETTAGE OF UTERUS      IA ANTERIOR COLPORRAPHY RPR CYSTOCELE W/CYSTO N/A 1/21/2020    Procedure: COLPORRHAPHY;  Surgeon: Dewey Sharpe MD;  [facility];  Service: Gynecology    IA VAGINAL HYSTERECTOMY UTERUS 250 GM/< N/A 1/21/2020    Procedure: HYSTERECTOMY VAGINAL TOTAL (TVH) left salpingectomy;  Surgeon: Dewey Sharpe MD;  [facility];  Service: Gynecology    WRIST FRACTURE SURGERY Right      No family history on file.  Social History     Socioeconomic History    Marital status: /Civil Union     Spouse name: Not on file    Number of children: Not on file    Years of education: Not on file    Highest education level: Not on file   Occupational History    Not on file   Tobacco Use    Smoking status: Never    Smokeless tobacco: Never   Vaping Use    Vaping status: Never Used   Substance and Sexual Activity    Alcohol use: Not Currently    Drug use: Never    Sexual activity: Yes     Birth control/protection: Post-menopausal   Other Topics Concern    Not on file   Social History Narrative    Not on file     Social Determinants of Health     Financial Resource Strain: Not on file   Food Insecurity: Not on file   Transportation Needs: Not on file   Physical Activity: Not on file   Stress: Not on file   Social Connections: Not on file   Intimate Partner Violence: Not on file   Housing Stability: Not on file     Past Surgical History:   Procedure Laterality Date    COLONOSCOPY      DILATION AND CURETTAGE OF UTERUS      SD ANTERIOR COLPORRAPHY RPR CYSTOCELE W/CYSTO N/A 1/21/2020    Procedure: COLPORRHAPHY;  Surgeon: Dewey Sharpe MD;  [facility];  Service: Gynecology    SD VAGINAL HYSTERECTOMY UTERUS 250 GM/< N/A 1/21/2020    Procedure: HYSTERECTOMY VAGINAL TOTAL (TVH) left salpingectomy;  Surgeon: Dewey Sharpe MD;  [facility];  Service: Gynecology    WRIST FRACTURE SURGERY Right        OBJECTIVE:  There were no vitals filed for this visit.  There is no height or weight on file to calculate BMI.  Physical Exam  Constitutional:       General: She is not in acute distress.     Appearance: Normal appearance. She is not ill-appearing, toxic-appearing or diaphoretic.   HENT:      Head: Normocephalic and atraumatic.      Nose: Nose normal.      Mouth/Throat:      Mouth: Mucous membranes are moist.      Pharynx: Oropharynx is clear.    Eyes:      Extraocular Movements: Extraocular movements intact.      Conjunctiva/sclera: Conjunctivae normal.   Cardiovascular:      Rate and Rhythm: Normal rate and regular rhythm.      Pulses: Normal pulses.   Pulmonary:      Effort: Pulmonary effort is normal. No respiratory distress.      Breath sounds: Normal breath sounds. No wheezing, rhonchi or rales.   Abdominal:      General: Bowel sounds are normal. There is no distension.      Palpations: Abdomen is soft. There is no mass.      Tenderness: There is no abdominal tenderness. There is no guarding.   Musculoskeletal:         General: No tenderness.      Right lower leg: Edema present.      Left lower leg: Edema present.      Comments: Bilateral pedal edema   Skin:     General: Skin is warm and dry.      Coloration: Skin is not pale.      Findings: No erythema, lesion or rash.   Neurological:      General: No focal deficit present.      Mental Status: She is alert. Mental status is at baseline.      Cranial Nerves: No cranial nerve deficit.      Motor: No weakness.      Gait: Gait abnormal.   Psychiatric:         Mood and Affect: Mood normal.         Behavior: Behavior normal.         Labs & Imaging:  Lab Results   Component Value Date    WBC 7.29 12/06/2023    HGB 11.9 12/06/2023    HCT 35.7 12/06/2023    MCV 93 12/06/2023     (L) 12/06/2023     Lab Results   Component Value Date    SODIUM 139 12/06/2023    K 3.5 12/06/2023     12/06/2023    CO2 28 12/06/2023    BUN 27 (H) 12/06/2023    CREATININE 0.80 12/06/2023    GLUC 96 12/06/2023    CALCIUM 9.4 12/06/2023     Lab Results   Component Value Date    DMAMKLAS57 302 12/05/2023     Lab Results   Component Value Date    QJH5KUORJVAE 0.687 12/05/2023     Lab Results   Component Value Date    YNLG04AMTUTY 54.7 12/05/2023      Results for orders placed during the hospital encounter of 12/05/23    CT head without contrast    Narrative  CT BRAIN - WITHOUT CONTRAST    INDICATION:   Fall, AMS  afterward.    COMPARISON:  None.    TECHNIQUE:  CT examination of the brain was performed.  Multiplanar 2D reformatted images were created from the source data.    Radiation dose length product (DLP) for this visit:  984 mGy-cm .  This examination, like all CT scans performed in the [facility], was performed utilizing techniques to minimize radiation dose exposure, including the use of iterative  reconstruction and automated exposure control.    IMAGE QUALITY:  Diagnostic.    FINDINGS:    PARENCHYMA:  No intracranial mass, mass effect or midline shift. No CT signs of acute infarction.  No acute parenchymal hemorrhage.    VENTRICLES AND EXTRA-AXIAL SPACES:  Normal ventricles for the patient's age. Prominent sulci compatible with mild volume loss.    VISUALIZED ORBITS: Status post bilateral cataract surgery.    PARANASAL SINUSES: Normal visualized paranasal sinuses.    CALVARIUM AND EXTRACRANIAL SOFT TISSUES:  Normal.    Impression  No acute intracranial abnormality.  Chronic findings, as above.            Advanced Directives: POLST completed following SNF admission  Code status: Updated to DNR while at SNF  PCP: MD Tatiana Harrington CRNP  Geriatric Medicine  12/18/2023    Discussion with patient/family and further instructions:  -Fall precautions  -Aspiration precautions  -Bleeding precautions  -Monitor for signs/symptoms of infection  -Medication list was reviewed and signed  -DME form was completed     Follow-up Recommendations: Please follow-up with your primary care physician within 7-10 days of discharge to review medication changes and current status.      Problem List Follow-up Recommendations:  I have spent 40 minutes with Patient /Family today in which greater than 50% of this time was spent in counseling/coordination of care.

## 2023-12-26 LAB — HBA1C MFR BLD HPLC: 5.1 %

## 2024-01-01 ENCOUNTER — HOME CARE VISIT (OUTPATIENT)
Dept: HOME HOSPICE | Facility: HOSPICE | Age: 84
End: 2024-01-01
Payer: MEDICARE

## 2024-01-01 ENCOUNTER — HOME CARE VISIT (OUTPATIENT)
Dept: HOME HEALTH SERVICES | Facility: HOME HEALTHCARE | Age: 84
End: 2024-01-01
Payer: MEDICARE

## 2024-01-01 ENCOUNTER — APPOINTMENT (EMERGENCY)
Dept: RADIOLOGY | Facility: HOSPITAL | Age: 84
DRG: 871 | End: 2024-01-01
Payer: COMMERCIAL

## 2024-01-01 ENCOUNTER — APPOINTMENT (EMERGENCY)
Dept: CT IMAGING | Facility: HOSPITAL | Age: 84
DRG: 871 | End: 2024-01-01
Payer: COMMERCIAL

## 2024-01-01 ENCOUNTER — HOSPITAL ENCOUNTER (INPATIENT)
Facility: HOSPITAL | Age: 84
LOS: 1 days | Discharge: HOSPICE | DRG: 871 | End: 2024-07-22
Attending: EMERGENCY MEDICINE | Admitting: INTERNAL MEDICINE
Payer: COMMERCIAL

## 2024-01-01 ENCOUNTER — APPOINTMENT (EMERGENCY)
Dept: CT IMAGING | Facility: HOSPITAL | Age: 84
End: 2024-01-01
Payer: COMMERCIAL

## 2024-01-01 ENCOUNTER — HOSPITAL ENCOUNTER (EMERGENCY)
Facility: HOSPITAL | Age: 84
Discharge: HOME | End: 2024-07-16
Attending: EMERGENCY MEDICINE
Payer: COMMERCIAL

## 2024-01-01 ENCOUNTER — APPOINTMENT (EMERGENCY)
Dept: RADIOLOGY | Facility: HOSPITAL | Age: 84
End: 2024-01-01
Payer: COMMERCIAL

## 2024-01-01 ENCOUNTER — HOSPICE ADMISSION (OUTPATIENT)
Dept: HOME HOSPICE | Facility: HOSPICE | Age: 84
End: 2024-01-01
Payer: MEDICARE

## 2024-01-01 VITALS
TEMPERATURE: 99 F | RESPIRATION RATE: 22 BRPM | HEART RATE: 88 BPM | SYSTOLIC BLOOD PRESSURE: 130 MMHG | DIASTOLIC BLOOD PRESSURE: 62 MMHG | OXYGEN SATURATION: 96 %

## 2024-01-01 VITALS
SYSTOLIC BLOOD PRESSURE: 104 MMHG | WEIGHT: 119.49 LBS | HEART RATE: 92 BPM | BODY MASS INDEX: 19.91 KG/M2 | OXYGEN SATURATION: 97 % | RESPIRATION RATE: 16 BRPM | TEMPERATURE: 98.8 F | DIASTOLIC BLOOD PRESSURE: 55 MMHG | HEIGHT: 65 IN

## 2024-01-01 VITALS — RESPIRATION RATE: 18 BRPM | HEART RATE: 100 BPM

## 2024-01-01 VITALS
HEIGHT: 65 IN | SYSTOLIC BLOOD PRESSURE: 108 MMHG | HEART RATE: 86 BPM | TEMPERATURE: 98.8 F | OXYGEN SATURATION: 94 % | BODY MASS INDEX: 19.83 KG/M2 | RESPIRATION RATE: 16 BRPM | WEIGHT: 119 LBS | DIASTOLIC BLOOD PRESSURE: 60 MMHG

## 2024-01-01 VITALS — RESPIRATION RATE: 22 BRPM | HEART RATE: 96 BPM

## 2024-01-01 DIAGNOSIS — F02.818 ALZHEIMER'S DEMENTIA WITH BEHAVIORAL DISTURBANCE (HCC): ICD-10-CM

## 2024-01-01 DIAGNOSIS — M16.9 HIP OSTEOARTHRITIS: ICD-10-CM

## 2024-01-01 DIAGNOSIS — R26.2 AMBULATORY DYSFUNCTION: Primary | ICD-10-CM

## 2024-01-01 DIAGNOSIS — R53.81 PHYSICAL DEBILITY: ICD-10-CM

## 2024-01-01 DIAGNOSIS — N39.0 UTI (URINARY TRACT INFECTION): ICD-10-CM

## 2024-01-01 DIAGNOSIS — M72.6 NECROTIZING FASCIITIS (HCC): Primary | ICD-10-CM

## 2024-01-01 DIAGNOSIS — G30.9 ALZHEIMER'S DEMENTIA WITH BEHAVIORAL DISTURBANCE (HCC): ICD-10-CM

## 2024-01-01 DIAGNOSIS — R65.10 SIRS (SYSTEMIC INFLAMMATORY RESPONSE SYNDROME) (HCC): ICD-10-CM

## 2024-01-01 DIAGNOSIS — L89.159 SACRAL DECUBITUS ULCER: ICD-10-CM

## 2024-01-01 DIAGNOSIS — F03.90 DEMENTIA (HCC): ICD-10-CM

## 2024-01-01 DIAGNOSIS — Z51.5: ICD-10-CM

## 2024-01-01 DIAGNOSIS — E44.1 MILD PROTEIN-CALORIE MALNUTRITION (HCC): ICD-10-CM

## 2024-01-01 LAB
ALBUMIN SERPL BCG-MCNC: 3.2 G/DL (ref 3.5–5)
ALP SERPL-CCNC: 66 U/L (ref 34–104)
ALT SERPL W P-5'-P-CCNC: 10 U/L (ref 7–52)
ANION GAP SERPL CALCULATED.3IONS-SCNC: 10 MMOL/L (ref 4–13)
APTT PPP: 31 SECONDS (ref 23–37)
AST SERPL W P-5'-P-CCNC: 12 U/L (ref 13–39)
ATRIAL RATE: 80 BPM
ATRIAL RATE: 87 BPM
B FRAGILIS DNA BLD POS QL NAA+NON-PROBE: DETECTED
BACTERIA BLD CULT: ABNORMAL
BACTERIA UR CULT: ABNORMAL
BACTERIA UR QL AUTO: ABNORMAL /HPF
BASOPHILS # BLD AUTO: 0.08 THOUSANDS/ÂΜL (ref 0–0.1)
BASOPHILS NFR BLD AUTO: 0 % (ref 0–1)
BILIRUB SERPL-MCNC: 0.58 MG/DL (ref 0.2–1)
BILIRUB UR QL STRIP: NEGATIVE
BUN SERPL-MCNC: 29 MG/DL (ref 5–25)
CALCIUM ALBUM COR SERPL-MCNC: 9.5 MG/DL (ref 8.3–10.1)
CALCIUM SERPL-MCNC: 8.9 MG/DL (ref 8.4–10.2)
CHLORIDE SERPL-SCNC: 104 MMOL/L (ref 96–108)
CLARITY UR: ABNORMAL
CO2 SERPL-SCNC: 27 MMOL/L (ref 21–32)
COLOR UR: YELLOW
CREAT SERPL-MCNC: 0.9 MG/DL (ref 0.6–1.3)
EOSINOPHIL # BLD AUTO: 0.01 THOUSAND/ÂΜL (ref 0–0.61)
EOSINOPHIL NFR BLD AUTO: 0 % (ref 0–6)
ERYTHROCYTE [DISTWIDTH] IN BLOOD BY AUTOMATED COUNT: 13.6 % (ref 11.6–15.1)
GFR SERPL CREATININE-BSD FRML MDRD: 60 ML/MIN/1.73SQ M
GLUCOSE SERPL-MCNC: 138 MG/DL (ref 65–140)
GLUCOSE UR STRIP-MCNC: NEGATIVE MG/DL
GRAM STN SPEC: ABNORMAL
GRAM STN SPEC: ABNORMAL
HCT VFR BLD AUTO: 35.3 % (ref 34.8–46.1)
HGB BLD-MCNC: 11.2 G/DL (ref 11.5–15.4)
HGB UR QL STRIP.AUTO: ABNORMAL
IMM GRANULOCYTES # BLD AUTO: 0.28 THOUSAND/UL (ref 0–0.2)
IMM GRANULOCYTES NFR BLD AUTO: 1 % (ref 0–2)
INR PPP: 1.2 (ref 0.84–1.19)
KETONES UR STRIP-MCNC: NEGATIVE MG/DL
LACTATE SERPL-SCNC: 1.5 MMOL/L (ref 0.5–2)
LEUKOCYTE ESTERASE UR QL STRIP: ABNORMAL
LYMPHOCYTES # BLD AUTO: 1.41 THOUSANDS/ÂΜL (ref 0.6–4.47)
LYMPHOCYTES NFR BLD AUTO: 6 % (ref 14–44)
MCH RBC QN AUTO: 29.9 PG (ref 26.8–34.3)
MCHC RBC AUTO-ENTMCNC: 31.7 G/DL (ref 31.4–37.4)
MCV RBC AUTO: 94 FL (ref 82–98)
MONOCYTES # BLD AUTO: 1.21 THOUSAND/ÂΜL (ref 0.17–1.22)
MONOCYTES NFR BLD AUTO: 5 % (ref 4–12)
NEUTROPHILS # BLD AUTO: 21.34 THOUSANDS/ÂΜL (ref 1.85–7.62)
NEUTS SEG NFR BLD AUTO: 88 % (ref 43–75)
NITRITE UR QL STRIP: POSITIVE
NON-SQ EPI CELLS URNS QL MICRO: ABNORMAL /HPF
NRBC BLD AUTO-RTO: 0 /100 WBCS
P AXIS: 22 DEGREES
P AXIS: 54 DEGREES
PH UR STRIP.AUTO: 5.5 [PH]
PLATELET # BLD AUTO: 185 THOUSANDS/UL (ref 149–390)
PMV BLD AUTO: 11.6 FL (ref 8.9–12.7)
POTASSIUM SERPL-SCNC: 3.8 MMOL/L (ref 3.5–5.3)
PR INTERVAL: 138 MS
PR INTERVAL: 150 MS
PROCALCITONIN SERPL-MCNC: 0.27 NG/ML
PROT SERPL-MCNC: 6.7 G/DL (ref 6.4–8.4)
PROT UR STRIP-MCNC: ABNORMAL MG/DL
PROTHROMBIN TIME: 15.9 SECONDS (ref 11.6–14.5)
QRS AXIS: -21 DEGREES
QRS AXIS: 23 DEGREES
QRSD INTERVAL: 66 MS
QRSD INTERVAL: 70 MS
QT INTERVAL: 348 MS
QT INTERVAL: 356 MS
QTC INTERVAL: 401 MS
QTC INTERVAL: 428 MS
RBC # BLD AUTO: 3.75 MILLION/UL (ref 3.81–5.12)
RBC #/AREA URNS AUTO: ABNORMAL /HPF
SODIUM SERPL-SCNC: 141 MMOL/L (ref 135–147)
SP GR UR STRIP.AUTO: 1.02 (ref 1–1.03)
STREPTOCOCCUS DNA BLD POS NAA+NON-PROBE: DETECTED
T WAVE AXIS: 158 DEGREES
T WAVE AXIS: 44 DEGREES
UROBILINOGEN UR STRIP-ACNC: <2 MG/DL
VENTRICULAR RATE: 80 BPM
VENTRICULAR RATE: 87 BPM
WBC # BLD AUTO: 24.33 THOUSAND/UL (ref 4.31–10.16)
WBC #/AREA URNS AUTO: ABNORMAL /HPF

## 2024-01-01 PROCEDURE — 84145 PROCALCITONIN (PCT): CPT

## 2024-01-01 PROCEDURE — G0299 HHS/HOSPICE OF RN EA 15 MIN: HCPCS

## 2024-01-01 PROCEDURE — 10330087 HSPC SERVICE INTENSITY ADD-ON

## 2024-01-01 PROCEDURE — 87186 SC STD MICRODIL/AGAR DIL: CPT

## 2024-01-01 PROCEDURE — 87076 CULTURE ANAEROBE IDENT EACH: CPT

## 2024-01-01 PROCEDURE — G0156 HHCP-SVS OF AIDE,EA 15 MIN: HCPCS

## 2024-01-01 PROCEDURE — 83605 ASSAY OF LACTIC ACID: CPT

## 2024-01-01 PROCEDURE — 36415 COLL VENOUS BLD VENIPUNCTURE: CPT

## 2024-01-01 PROCEDURE — 10330057 MEDICATION, GENERAL

## 2024-01-01 PROCEDURE — 99285 EMERGENCY DEPT VISIT HI MDM: CPT | Performed by: EMERGENCY MEDICINE

## 2024-01-01 PROCEDURE — 99223 1ST HOSP IP/OBS HIGH 75: CPT | Performed by: INTERNAL MEDICINE

## 2024-01-01 PROCEDURE — 71045 X-RAY EXAM CHEST 1 VIEW: CPT

## 2024-01-01 PROCEDURE — 73502 X-RAY EXAM HIP UNI 2-3 VIEWS: CPT

## 2024-01-01 PROCEDURE — 87077 CULTURE AEROBIC IDENTIFY: CPT

## 2024-01-01 PROCEDURE — 93005 ELECTROCARDIOGRAM TRACING: CPT

## 2024-01-01 PROCEDURE — 80053 COMPREHEN METABOLIC PANEL: CPT

## 2024-01-01 PROCEDURE — 81001 URINALYSIS AUTO W/SCOPE: CPT

## 2024-01-01 PROCEDURE — 85025 COMPLETE CBC W/AUTO DIFF WBC: CPT

## 2024-01-01 PROCEDURE — 96365 THER/PROPH/DIAG IV INF INIT: CPT

## 2024-01-01 PROCEDURE — 96360 HYDRATION IV INFUSION INIT: CPT

## 2024-01-01 PROCEDURE — 87185 SC STD ENZYME DETCJ PER NZM: CPT

## 2024-01-01 PROCEDURE — 85730 THROMBOPLASTIN TIME PARTIAL: CPT

## 2024-01-01 PROCEDURE — 87086 URINE CULTURE/COLONY COUNT: CPT

## 2024-01-01 PROCEDURE — 99283 EMERGENCY DEPT VISIT LOW MDM: CPT

## 2024-01-01 PROCEDURE — 96367 TX/PROPH/DG ADDL SEQ IV INF: CPT

## 2024-01-01 PROCEDURE — G0155 HHCP-SVS OF CSW,EA 15 MIN: HCPCS

## 2024-01-01 PROCEDURE — 99284 EMERGENCY DEPT VISIT MOD MDM: CPT | Performed by: EMERGENCY MEDICINE

## 2024-01-01 PROCEDURE — 87040 BLOOD CULTURE FOR BACTERIA: CPT

## 2024-01-01 PROCEDURE — 87181 SC STD AGAR DILUTION PER AGT: CPT

## 2024-01-01 PROCEDURE — 70450 CT HEAD/BRAIN W/O DYE: CPT

## 2024-01-01 PROCEDURE — 93010 ELECTROCARDIOGRAM REPORT: CPT | Performed by: INTERNAL MEDICINE

## 2024-01-01 PROCEDURE — 72125 CT NECK SPINE W/O DYE: CPT

## 2024-01-01 PROCEDURE — 96375 TX/PRO/DX INJ NEW DRUG ADDON: CPT

## 2024-01-01 PROCEDURE — 99239 HOSP IP/OBS DSCHRG MGMT >30: CPT | Performed by: INTERNAL MEDICINE

## 2024-01-01 PROCEDURE — 87154 CUL TYP ID BLD PTHGN 6+ TRGT: CPT

## 2024-01-01 PROCEDURE — NC001 PR NO CHARGE: Performed by: SURGERY

## 2024-01-01 PROCEDURE — 74177 CT ABD & PELVIS W/CONTRAST: CPT

## 2024-01-01 PROCEDURE — 85610 PROTHROMBIN TIME: CPT

## 2024-01-01 RX ORDER — MORPHINE SULFATE 100 MG/5ML
5 SOLUTION, CONCENTRATE ORAL EVERY 2 HOUR PRN
Qty: 30 ML | Refills: 0 | Status: SHIPPED | OUTPATIENT
Start: 2024-01-01 | End: 2024-01-01

## 2024-01-01 RX ORDER — QUETIAPINE FUMARATE 25 MG/1
25 TABLET, FILM COATED ORAL
Status: CANCELLED | OUTPATIENT
Start: 2024-01-01

## 2024-01-01 RX ORDER — ACETAMINOPHEN 10 MG/ML
1000 INJECTION, SOLUTION INTRAVENOUS ONCE
Status: COMPLETED | OUTPATIENT
Start: 2024-01-01 | End: 2024-01-01

## 2024-01-01 RX ORDER — AMOXICILLIN 250 MG
2 CAPSULE ORAL 2 TIMES DAILY
Status: CANCELLED | OUTPATIENT
Start: 2024-01-01

## 2024-01-01 RX ORDER — LORAZEPAM 2 MG/ML
1 INJECTION INTRAMUSCULAR
Status: DISCONTINUED | OUTPATIENT
Start: 2024-01-01 | End: 2024-01-01

## 2024-01-01 RX ORDER — GABAPENTIN 100 MG/1
100 CAPSULE ORAL 3 TIMES DAILY
Status: CANCELLED | OUTPATIENT
Start: 2024-01-01

## 2024-01-01 RX ORDER — LORAZEPAM 2 MG/ML
1 INJECTION INTRAMUSCULAR
Status: DISCONTINUED | OUTPATIENT
Start: 2024-01-01 | End: 2024-01-01 | Stop reason: HOSPADM

## 2024-01-01 RX ORDER — ACETAMINOPHEN 650 MG/1
650 SUPPOSITORY RECTAL EVERY 6 HOURS PRN
Status: DISCONTINUED | OUTPATIENT
Start: 2024-01-01 | End: 2024-01-01 | Stop reason: HOSPADM

## 2024-01-01 RX ORDER — GLYCOPYRROLATE 0.2 MG/ML
0.1 INJECTION INTRAMUSCULAR; INTRAVENOUS EVERY 4 HOURS PRN
Status: DISCONTINUED | OUTPATIENT
Start: 2024-01-01 | End: 2024-01-01 | Stop reason: HOSPADM

## 2024-01-01 RX ORDER — BISACODYL 10 MG
10 SUPPOSITORY, RECTAL RECTAL DAILY PRN
Status: DISCONTINUED | OUTPATIENT
Start: 2024-01-01 | End: 2024-01-01 | Stop reason: HOSPADM

## 2024-01-01 RX ORDER — BISACODYL 10 MG
10 SUPPOSITORY, RECTAL RECTAL DAILY PRN
Status: CANCELLED | OUTPATIENT
Start: 2024-01-01

## 2024-01-01 RX ORDER — LORAZEPAM 0.5 MG/1
0.5 TABLET ORAL EVERY 4 HOURS PRN
Qty: 5 TABLET | Refills: 0 | Status: SHIPPED | OUTPATIENT
Start: 2024-01-01 | End: 2024-01-01

## 2024-01-01 RX ORDER — FENTANYL CITRATE 50 UG/ML
25 INJECTION, SOLUTION INTRAMUSCULAR; INTRAVENOUS EVERY 2 HOUR PRN
Status: DISCONTINUED | OUTPATIENT
Start: 2024-01-01 | End: 2024-01-01 | Stop reason: HOSPADM

## 2024-01-01 RX ADMIN — IOHEXOL 70 ML: 350 INJECTION, SOLUTION INTRAVENOUS at 10:46

## 2024-01-01 RX ADMIN — FENTANYL CITRATE 25 MCG: 50 INJECTION INTRAMUSCULAR; INTRAVENOUS at 11:50

## 2024-01-01 RX ADMIN — PIPERACILLIN SODIUM AND TAZOBACTAM SODIUM 4.5 G: 36; 4.5 INJECTION, POWDER, LYOPHILIZED, FOR SOLUTION INTRAVENOUS at 13:40

## 2024-01-01 RX ADMIN — ACETAMINOPHEN 1000 MG: 10 INJECTION INTRAVENOUS at 10:00

## 2024-01-01 RX ADMIN — VANCOMYCIN HYDROCHLORIDE 1250 MG: 5 INJECTION, POWDER, LYOPHILIZED, FOR SOLUTION INTRAVENOUS at 14:50

## 2024-01-01 RX ADMIN — DEXTROSE 2000 MG: 50 INJECTION, SOLUTION INTRAVENOUS at 10:09

## 2024-01-01 RX ADMIN — SODIUM CHLORIDE 1000 ML: 0.9 INJECTION, SOLUTION INTRAVENOUS at 10:31

## 2024-04-07 ENCOUNTER — APPOINTMENT (EMERGENCY)
Dept: RADIOLOGY | Facility: HOSPITAL | Age: 84
End: 2024-04-07
Payer: COMMERCIAL

## 2024-04-07 ENCOUNTER — APPOINTMENT (EMERGENCY)
Dept: CT IMAGING | Facility: HOSPITAL | Age: 84
End: 2024-04-07
Payer: COMMERCIAL

## 2024-04-07 ENCOUNTER — HOSPITAL ENCOUNTER (EMERGENCY)
Facility: HOSPITAL | Age: 84
Discharge: NURSING, REHAB OR LONG-TERM CARE | End: 2024-04-07
Attending: EMERGENCY MEDICINE
Payer: COMMERCIAL

## 2024-04-07 VITALS
HEART RATE: 82 BPM | TEMPERATURE: 98.7 F | SYSTOLIC BLOOD PRESSURE: 137 MMHG | DIASTOLIC BLOOD PRESSURE: 63 MMHG | RESPIRATION RATE: 18 BRPM | OXYGEN SATURATION: 96 %

## 2024-04-07 DIAGNOSIS — S70.01XA CONTUSION OF RIGHT HIP, INITIAL ENCOUNTER: Primary | ICD-10-CM

## 2024-04-07 DIAGNOSIS — S32.82XD MULTIPLE CLOSED FRACTURES OF PELVIS WITHOUT DISRUPTION OF PELVIC RING WITH ROUTINE HEALING, SUBSEQUENT ENCOUNTER: ICD-10-CM

## 2024-04-07 PROCEDURE — 73700 CT LOWER EXTREMITY W/O DYE: CPT

## 2024-04-07 PROCEDURE — 73502 X-RAY EXAM HIP UNI 2-3 VIEWS: CPT

## 2024-04-07 NOTE — DISCHARGE INSTRUCTIONS
CT right hip    IMPRESSION:     No femoral neck fracture.     Healing right superior and inferior pubic ramus fractures and healing right sacral insufficiency fracture, all unchanged in alignment.     Moderate hip osteoarthritis.

## 2024-04-07 NOTE — ED PROVIDER NOTES
History  Chief Complaint   Patient presents with    Fall     Patient arrived via EMS form [facility]. Unwitnessed fall at 07:00. Portable xray done at [facility]. Rule out right hip fx. Recent pelvic fractures.          83-year-old female, advanced dementia, here with her , had an unwitnessed fall at nursing facility, complaining of right hip pain.,  Is a history of pelvic fractures.  Patient unable to provide any history, but when I do move the right hip she does say it hurts,  states that this is her baseline mental status which is GCS 13-14          History provided by:  Patient and spouse  Fall  Associated symptoms: no abdominal pain, no chest pain, no headaches, no nausea, no neck pain and no vomiting        Prior to Admission Medications   Prescriptions Last Dose Informant Patient Reported? Taking?   Ascorbic Acid (vitamin C) 100 MG tablet   Yes No   Sig: Take 100 mg by mouth daily   LORazepam (Ativan) 0.5 mg tablet   No No   Sig: Take 0.5 tablets (0.25 mg total) by mouth 2 (two) times a day as needed for anxiety (Every 8 hours prn anxiety times 3 doses.)   Patient taking differently: Take 0.25 mg by mouth every 8 (eight) hours as needed for anxiety (Every 8 hours prn anxiety times.)   QUEtiapine (SEROquel) 25 mg tablet   No No   Sig: Take 1 tablet (25 mg total) by mouth daily at bedtime   QUEtiapine (SEROquel) 25 mg tablet   Yes No   Sig: Take 12.5 mg by mouth daily   acetaminophen (TYLENOL) 325 mg tablet   No No   Sig: Take 3 tablets (975 mg total) by mouth every 8 (eight) hours   bisacodyl (DULCOLAX) 10 mg suppository   No No   Sig: Insert 1 suppository (10 mg total) into the rectum daily as needed for constipation   cholecalciferol (VITAMIN D3) 400 units tablet   Yes No   Sig: Take 400 Units by mouth daily   felodipine (PLENDIL) 10 MG 24 hr tablet   Yes No   Sig: Take 10 mg by mouth daily   gabapentin (NEURONTIN) 100 mg capsule   No No   Sig: Take 1 capsule (100 mg total) by mouth 3 (three)  times a day   melatonin 3 mg   No No   Sig: Take 1 tablet (3 mg total) by mouth 2 hours prior to bedtime.   polyethylene glycol (MIRALAX) 17 g packet   No No   Sig: Take 17 g by mouth daily Do not start before December 9, 2023.   senna-docusate sodium (SENOKOT S) 8.6-50 mg per tablet   No No   Sig: Take 2 tablets by mouth 2 (two) times a day for 15 days      Facility-Administered Medications: None       Past Medical History:   Diagnosis Date    Altered mental status 11/01/2023    Constipation 11/01/2023    Dementia (HCC)     Hypertension        Past Surgical History:   Procedure Laterality Date    COLONOSCOPY      DILATION AND CURETTAGE OF UTERUS      LA ANTERIOR COLPORRAPHY RPR CYSTOCELE W/CYSTO N/A 1/21/2020    Procedure: COLPORRHAPHY;  Surgeon: Dewey Sharpe MD;  [facility];  Service: Gynecology    LA VAGINAL HYSTERECTOMY UTERUS 250 GM/< N/A 1/21/2020    Procedure: HYSTERECTOMY VAGINAL TOTAL (TVH) left salpingectomy;  Surgeon: Dewey Sharpe MD;  [facility];  Service: Gynecology    WRIST FRACTURE SURGERY Right        History reviewed. No pertinent family history.  I have reviewed and agree with the history as documented.    E-Cigarette/Vaping    E-Cigarette Use Never User      E-Cigarette/Vaping Substances     Social History     Tobacco Use    Smoking status: Never    Smokeless tobacco: Never   Vaping Use    Vaping status: Never Used   Substance Use Topics    Alcohol use: Not Currently    Drug use: Never       Review of Systems   Constitutional:  Negative for activity change, chills, diaphoresis and fever.   HENT:  Negative for congestion, sinus pressure and sore throat.    Eyes:  Negative for pain and visual disturbance.   Respiratory:  Negative for cough, chest tightness, shortness of breath, wheezing and stridor.    Cardiovascular:  Negative for chest pain and palpitations.   Gastrointestinal:  Negative for abdominal distention, abdominal pain, constipation, diarrhea, nausea and vomiting.    Genitourinary:  Negative for dysuria and frequency.   Musculoskeletal:  Negative for neck pain and neck stiffness.   Skin:  Negative for rash.   Neurological:  Negative for dizziness, speech difficulty, light-headedness, numbness and headaches.       Physical Exam  Physical Exam  Vitals reviewed.   Constitutional:       General: She is not in acute distress.     Appearance: She is well-developed. She is not diaphoretic.   HENT:      Head: Normocephalic and atraumatic.      Right Ear: External ear normal.      Left Ear: External ear normal.      Nose: Nose normal.   Eyes:      General:         Right eye: No discharge.         Left eye: No discharge.      Pupils: Pupils are equal, round, and reactive to light.   Neck:      Trachea: No tracheal deviation.   Cardiovascular:      Rate and Rhythm: Normal rate and regular rhythm.      Heart sounds: Normal heart sounds. No murmur heard.  Pulmonary:      Effort: Pulmonary effort is normal. No respiratory distress.      Breath sounds: Normal breath sounds. No stridor.   Abdominal:      General: There is no distension.      Palpations: Abdomen is soft.      Tenderness: There is no abdominal tenderness. There is no guarding or rebound.   Musculoskeletal:         General: Normal range of motion.      Cervical back: Normal range of motion and neck supple.   Skin:     General: Skin is warm and dry.      Coloration: Skin is not pale.      Findings: No erythema.   Neurological:      Mental Status: She is alert.      Comments: GCS 13, 3, 4, 6    Pleasantly demented         Vital Signs  ED Triage Vitals [04/07/24 1630]   Temperature Pulse Respirations Blood Pressure SpO2   98.7 °F (37.1 °C) 79 16 131/61 95 %      Temp Source Heart Rate Source Patient Position - Orthostatic VS BP Location FiO2 (%)   Oral Monitor Lying Right arm --      Pain Score       --           Vitals:    04/07/24 1630 04/07/24 1914   BP: 131/61 137/63   Pulse: 79 82   Patient Position - Orthostatic VS: Lying  Lying         Visual Acuity      ED Medications  Medications - No data to display    Diagnostic Studies  Results Reviewed       None                   CT lower extremity wo contrast right   Final Result by Adonay Liu MD (04/07 1912)      No femoral neck fracture.      Healing right superior and inferior pubic ramus fractures and healing right sacral insufficiency fracture, all unchanged in alignment.      Moderate hip osteoarthritis.      Workstation performed: [id number]         XR hip/pelv 2-3 vws right   ED Interpretation by Ken Herrera DO (04/07 1656)   Redemonstrated right-sided pubic fracture and acetabular fracture no new fractures.  No acute hip fracture                 Procedures  Procedures         ED Course                               SBIRT 20yo+      Flowsheet Row Most Recent Value   Initial Alcohol Screen: US AUDIT-C     1. How often do you have a drink containing alcohol? 0 Filed at: 04/07/2024 1632   2. How many drinks containing alcohol do you have on a typical day you are drinking?  0 Filed at: 04/07/2024 1632   3a. Male UNDER 65: How often do you have five or more drinks on one occasion? 0 Filed at: 04/07/2024 1632   3b. FEMALE Any Age, or MALE 65+: How often do you have 4 or more drinks on one occassion? 0 Filed at: 04/07/2024 1632   Audit-C Score 0 Filed at: 04/07/2024 1632   LORY: How many times in the past year have you...    Used an illegal drug or used a prescription medication for non-medical reasons? Never Filed at: 04/07/2024 1632                      Medical Decision Making        Initial ED assessment:   83-year-old female, unwitnessed fall, complaining of right hip pain does have recent right acetabular and pubic rami fractures    Initial DDx includes but is not limited to:   Hip fracture, pain from her recent fractures    Initial ED plan:   X-ray, discussion with , very limited intervention does not want aggressive measures        Final ED summary/disposition:    After evaluation and workup in the emergency department, ultimately obtained x-ray interpretation from outpatient x-ray which there were suggestive for subcapital fracture so a CT was performed  No evidence of fracture patient discharged back to nursing facility    Amount and/or Complexity of Data Reviewed  Radiology: ordered and independent interpretation performed.             Disposition  Final diagnoses:   Contusion of right hip, initial encounter   Multiple closed fractures of pelvis without disruption of pelvic ring with routine healing, subsequent encounter     Time reflects when diagnosis was documented in both MDM as applicable and the Disposition within this note       Time User Action Codes Description Comment    4/7/2024  4:55 PM Ken Herrera Add [S70.01XA] Contusion of right hip, initial encounter     4/7/2024  4:55 PM Ken Herrera Add [S32.82XD] Multiple closed fractures of pelvis without disruption of pelvic ring with routine healing, subsequent encounter           ED Disposition       ED Disposition   Discharge    Condition   Stable    Date/Time   Sun Apr 7, 2024 1651    Comment   Elsi Bo discharge to home/self care.                   Follow-up Information       Follow up With Specialties Details Why Contact Info    Mauro Coronel MD Internal Medicine Go to  As needed [address] [place] [address]  [phone]              Discharge Medication List as of 4/7/2024  7:41 PM        CONTINUE these medications which have NOT CHANGED    Details   acetaminophen (TYLENOL) 325 mg tablet Take 3 tablets (975 mg total) by mouth every 8 (eight) hours, Starting Fri 12/8/2023, No Print      Ascorbic Acid (vitamin C) 100 MG tablet Take 100 mg by mouth daily, Historical Med      bisacodyl (DULCOLAX) 10 mg suppository Insert 1 suppository (10 mg total) into the rectum daily as needed for constipation, Starting u 11/2/2023, Normal      cholecalciferol (VITAMIN D3) 400 units tablet Take 400  Units by mouth daily, Historical Med      felodipine (PLENDIL) 10 MG 24 hr tablet Take 10 mg by mouth daily, Starting Fri 5/31/2019, Historical Med      gabapentin (NEURONTIN) 100 mg capsule Take 1 capsule (100 mg total) by mouth 3 (three) times a day, Starting Fri 12/8/2023, No Print      LORazepam (Ativan) 0.5 mg tablet Take 0.5 tablets (0.25 mg total) by mouth 2 (two) times a day as needed for anxiety (Every 8 hours prn anxiety times 3 doses.), Starting Tue 12/12/2023, No Print      melatonin 3 mg Take 1 tablet (3 mg total) by mouth 2 hours prior to bedtime., Normal      polyethylene glycol (MIRALAX) 17 g packet Take 17 g by mouth daily Do not start before December 9, 2023., Starting Sat 12/9/2023, No Print      !! QUEtiapine (SEROquel) 25 mg tablet Take 1 tablet (25 mg total) by mouth daily at bedtime, Starting Fri 12/8/2023, No Print      !! QUEtiapine (SEROquel) 25 mg tablet Take 12.5 mg by mouth daily, Historical Med      senna-docusate sodium (SENOKOT S) 8.6-50 mg per tablet Take 2 tablets by mouth 2 (two) times a day for 15 days, Starting Sun 12/10/2023, Until Mon 12/25/2023, Normal       !! - Potential duplicate medications found. Please discuss with provider.          No discharge procedures on file.    PDMP Review         Value Time User    PDMP Reviewed  Yes 12/10/2023  1:58 PM DEBRA Maria            ED Provider  Electronically Signed by             Ken Herrera, DO  04/07/24 0267       Ken Herrera, DO  04/08/24 [id number]

## 2024-04-07 NOTE — ED CARE HANDOFF
Emergency Department Sign Out Note        Sign out and transfer of care from Dr. Herrera at 1900. See Separate Emergency Department note.     The patient, Elsi Bo, was evaluated by the previous provider for possible abnormal outpatient x-ray.    Workup Completed:  X-ray here with no acute fracture.     ED Course / Workup Pending (followup):  CT right hip pending at time of signout, disposition accordingly based on CT results.    CT lower extremity wo contrast right   Final Result by Adonay Liu MD (04/07 1912)      No femoral neck fracture.      Healing right superior and inferior pubic ramus fractures and healing right sacral insufficiency fracture, all unchanged in alignment.      Moderate hip osteoarthritis.      Workstation performed: [id number]         XR hip/pelv 2-3 vws right   ED Interpretation by Ken Herrera DO (04/07 1656)   Redemonstrated right-sided pubic fracture and acetabular fracture no new fractures.  No acute hip fracture        CT scan consistent with healing pubic ramus fracture which is known, no acute femoral neck fracture.  Patient reassessed, resting comfortably in the bed.                               Procedures  Medical Decision Making  Amount and/or Complexity of Data Reviewed  Radiology: ordered and independent interpretation performed.            Disposition  Final diagnoses:   Contusion of right hip, initial encounter   Multiple closed fractures of pelvis without disruption of pelvic ring with routine healing, subsequent encounter     Time reflects when diagnosis was documented in both MDM as applicable and the Disposition within this note       Time User Action Codes Description Comment    4/7/2024  4:55 PM Ken Herrera Add [S70.01XA] Contusion of right hip, initial encounter     4/7/2024  4:55 PM Ken Herrera Add [S32.82XD] Multiple closed fractures of pelvis without disruption of pelvic ring with routine healing, subsequent encounter           ED  Disposition       ED Disposition   Discharge    Condition   Stable    Date/Time   Sun Apr 7, 2024  4:55 PM    Comment   Elsi Bo discharge to home/self care.                   Follow-up Information       Follow up With Specialties Details Why Contact Info    Mauro Coronel MD Internal Medicine Go to  As needed [address] [place], [place] [id number] [phone]            Patient's Medications   Discharge Prescriptions    No medications on file     No discharge procedures on file.       ED Provider  Electronically Signed by     Hugh Clarke MD  04/07/24 1955

## 2024-06-20 NOTE — PROGRESS NOTES
"Diabetes Care Specialist Follow-up Note  Author: Elisabeth Gleason RD, CDE  Date: 6/20/2024    Program Intake  Reason for Diabetes Program Visit:: Intervention  Type of Intervention:: Individual  Individual: Education  Education: Pattern Management  Current diabetes risk level:: low (HgbA1c 5.1)  In the last 12 months, have you:: none  Permission to speak with others about care:: no  Continuous Glucose Monitoring  Patient has CGM: Yes  Personal CGM type:: Dexcom G7  GMI Date: 05/20/24  GMI Value: 5.7 %    Lab Results   Component Value Date    HGBA1C 5.0 04/02/2024     A1c Pre Diabetes Care Specialist Intervention:  10.1%    Clinical    Weight: 68.9 kg (152 lb)   Height: 5' 1" (154.9 cm)   Body mass index is 28.72 kg/m².    Patient Health Rating  Compared to other people your age, how would you rate your health?: Good    Problem Review  Reviewed Problem List with Patient: yes  Active comorbidities affecting diabetes self-care.: yes  Comorbidities: Hypertension  Reviewed health maintenance: yes    Clinical Assessment  Current Diabetes Treatment: Oral Medication, Diet, Injectable, Insulin  Have you ever experienced hypoglycemia (low blood sugar)?: yes  In the last month, how often have you experienced low blood sugar?: more than once a day  Are you able to tell when your blood sugar is low?: Yes  What symptoms do you experience?: Shaky  Have you ever been hospitalized because your blood sugar was too low?: no  How do you treat hypoglycemia (low blood sugar)?: 1/2 can soda/fruit juice, 4 glucose tablets, other  Have you ever experienced hyperglycemia (high blood sugar)?: yes  In the last month, how often have you experienced high blood sugar?: other (see comments)  Are you able to tell when your blood sugar is high?: No (comment)  Have you ever been hospitalized because your blood sugar was high?: no    Medication Information  Medication adherence impacting ability to self-manage diabetes?: No    Labs  Lab Compliance " The patient is here for an incision check  She had a hysterectomy on 1/21/2020  No cramping  The patient has light spotting  The patient has no complaints  Barriers: No    Nutritional Status  Meal Plan 24 Hour Recall: Breakfast, Lunch, Dinner, Snack  Meal Plan 24 Hour Recall - Breakfast: skipped or sometimes a biscuit  Meal Plan 24 Hour Recall - Lunch: something out, sometimes skips  Meal Plan 24 Hour Recall - Dinner: something like protein, veggie, starch, ramen noodles, pizza, something out sometimes  Meal Plan 24 Hour Recall - Snack: a piece of sugar free candy, sugar free jello, beverages: diet tea, diet chek drinks, zero sugar fruit punch, water, powerade zero  Current nutritional status an area of need that is impacting patient's ability to self-manage diabetes?: No    Physical activity/Exercise:   No change    SMBG: using the dexcom G7        Additional Social History    Support  Is Support an area impacting ability to self-manage diabetes?: No    Access to Mass Media & Technology  Media or technology needs impacting ability to self-manage diabetes?: No    Cognitive/Behavioral Health  Cognitive or behavioral barriers impacting ability to self-manage diabetes?: No    Culture/Christian  Culture or Amish beliefs that may impact ability to access healthcare: No    Communication  Communication needs impacting ability to self-manage diabetes?: No    Health Literacy  Health literacy needs impacting ability to self-manage diabetes?: No      Diabetes Self-Management Skills Assessment     Diabetes Disease Process/Treatment Options  Diabetes Disease Process/Treatment Options: Skills Assessment Completed: No  Assessment indicates:: Adequate understanding  Deferred due to:: Other (comment) (previously completed, there has been no change and patient has adequate understanding)  Area of need?: No    Nutrition/Healthy Eating  Nutrition/Healthy Eating Skills Assessment Completed:: No  Assessment indicates:: Adequate understanding  Deffered due to:: Other (comment) (previously completed, there has been no change and patient has adequate understanding)  Area of need?:  No    Physical Activity/Exercise  Physical Activity/Exercise Skills Assessment Completed: : No  Assessment indicates:: Adequate understanding  Deffered due to:: Other (comment) (previously completed, there has been no change and patient has adequate understanding)  Area of need?: No    Medications  Medication Skills Assessment Completed:: No  Assessment indicates:: Adequate understanding  Deffered due to:: Other (comment) (previously completed, there has been no change and patient has adequate understanding)  Area of need?: No    Home Blood Glucose Monitoring  Personal CGM type:: Dexcom G7  Home Blood Glucose Monitoring Skills Assessment Completed: : No  Assessment indicates:: Adequate understanding  Deferred due to:: Other (comment) (previously completed, there has been no change and patient has adequate understanding)  Area of need?: No    Acute Complications  Acute Complications Skills Assessment Completed: : No  Assessment indicates:: Adequate understanding  Deffered due to:: Other (comment) (previously completed, there has been no change and patient has adequate understanding)  Area of need?: No    Chronic Complications  Chronic Complications Skills Assessment Completed: : No  Assessment indicates:: Adequate understanding  Deferred due to:: Other (comment) (previously completed, there has been no change and patient has adequate understanding)  Area of need?: No    Psychosocial/Coping  Psychosocial/Coping Skills Assessment Completed: : No  Assessment indicates:: Adequate understanding  Deffered due to:: Other (comment) (previously completed, there has been no change and patient has adequate understanding)  Area of need?: No      During today's follow-up visit,  the following areas required further assessment and content was provided/reviewed.    Based on today's diabetes care assessment, the following areas of need were identified:          6/19/2024    12:01 AM   Social   Support No   Access to Mass Media/Tech  No   Cognitive/Behavioral Health No   Culture/Anabaptist No   Communication No   Health Literacy No            6/19/2024    12:01 AM   Clinical   Medication Adherence No   Lab Compliance No   Nutritional Status No            6/19/2024    12:01 AM   Diabetes Self-Management Skills   Diabetes Disease Process/Treatment Options No   Nutrition/Healthy Eating No   Physical Activity/Exercise No   Medication No, for now will go back up to the 10 units instead of the 8 units because sugars are running a little higher   Home Blood Glucose Monitoring No, Comparison of previous CGM data 5/20/2024 (before cruise and reducing tresiba to 8 units from 10 units to current    Average Glucose 130 increased from 101  Standard Deviation 39 increased from 26  GMI 6.4 % increased from 5.7 %    Time in Range  2% of time patient was in Very High Range increased slightly from <1%  9% of time patient was in High Range increased from 1%  88% of time patient was in Range decreased from 95%  <1% of time patient was in Low Range improved from 3%  0% of time patient was in Very Low Range improved from <1%     Acute Complications No   Chronic Complications No   Psychosocial/Coping No        Today's interventions were provided through individual discussion, instruction, and written materials were provided.    Patient verbalized understanding of instruction and written materials.  Pt was able to return back demonstration of instructions today. Patient understood key points, needs reinforcement and further instruction.     Diabetes Self-Management Care Plan Review and Evaluation of Progress:    During today's follow-up Rosalin[facility] Diabetes Self-Management Care Plan progress was reviewed and progress was evaluated including his/her input. Desiree has agreed to continue his/her journey to improve/maintain overall diabetes control by continuing to set health goals. See care plan progress below.      There are no recently modified care plans to display for  this patient.      Follow Up Plan     Follow up in about 3 months (around 9/19/2024) for Personal CGM Upload, General Follow-up.    Today's care plan and follow up schedule was discussed with patient.  Desiree verbalized understanding of the care plan, goals, and agrees to follow up plan.        The patient was encouraged to communicate with his/her health care provider/physician and care team regarding his/her condition(s) and treatment.  I provided the patient with my contact information today and encouraged to contact me via phone or [facility] Patient Portal as needed.     Length of Visit   Total Time: 35 Minutes

## 2024-07-16 NOTE — ED NOTES
Roundtrip to [facility] set up at this time; awaiting pickup time.     Alyson Fay RN  07/16/24 [phone] pickup with Ana M Fay RN  07/16/24 [id number]

## 2024-07-16 NOTE — DISCHARGE INSTRUCTIONS
Continue to keep an eye on Elsi and monitor for signs of worsening pain - if this acutely worsens or she has any trauma please return to the emergency department. Otherwise, follow with PCP as needed.

## 2024-07-16 NOTE — ED PROVIDER NOTES
History  Chief Complaint   Patient presents with    Hip Pain     From [facility], oriented only to person which is baseline per EMS. NH found her today with left hip deformity. No fall reported.      HPI    83-year-old female with history of advanced dementia presenting with concern for left hip deformity.  According to her , she was with him this morning and when he attempted to move her from bed to chair, he noticed a bulge sticking out at the left hip.  He was concern for acute injury.  Staff at [facility] say that they are not aware of any recent falls or trauma.  Patient is not able to provide any history due to dementia.    Prior to Admission Medications   Prescriptions Last Dose Informant Patient Reported? Taking?   Ascorbic Acid (vitamin C) 100 MG tablet   Yes No   Sig: Take 100 mg by mouth daily   LORazepam (Ativan) 0.5 mg tablet   No No   Sig: Take 0.5 tablets (0.25 mg total) by mouth 2 (two) times a day as needed for anxiety (Every 8 hours prn anxiety times 3 doses.)   Patient taking differently: Take 0.25 mg by mouth every 8 (eight) hours as needed for anxiety (Every 8 hours prn anxiety times.)   QUEtiapine (SEROquel) 25 mg tablet   No No   Sig: Take 1 tablet (25 mg total) by mouth daily at bedtime   QUEtiapine (SEROquel) 25 mg tablet   Yes No   Sig: Take 12.5 mg by mouth daily   acetaminophen (TYLENOL) 325 mg tablet   No No   Sig: Take 3 tablets (975 mg total) by mouth every 8 (eight) hours   bisacodyl (DULCOLAX) 10 mg suppository   No No   Sig: Insert 1 suppository (10 mg total) into the rectum daily as needed for constipation   cholecalciferol (VITAMIN D3) 400 units tablet   Yes No   Sig: Take 400 Units by mouth daily   felodipine (PLENDIL) 10 MG 24 hr tablet   Yes No   Sig: Take 10 mg by mouth daily   gabapentin (NEURONTIN) 100 mg capsule   No No   Sig: Take 1 capsule (100 mg total) by mouth 3 (three) times a day   melatonin 3 mg   No No   Sig: Take 1 tablet (3 mg total) by mouth 2 hours prior  to bedtime.   polyethylene glycol (MIRALAX) 17 g packet   No No   Sig: Take 17 g by mouth daily Do not start before December 9, 2023.   senna-docusate sodium (SENOKOT S) 8.6-50 mg per tablet   No No   Sig: Take 2 tablets by mouth 2 (two) times a day for 15 days      Facility-Administered Medications: None       Past Medical History:   Diagnosis Date    Altered mental status 11/01/2023    Constipation 11/01/2023    Dementia (HCC)     Hypertension        Past Surgical History:   Procedure Laterality Date    COLONOSCOPY      DILATION AND CURETTAGE OF UTERUS      UT ANTERIOR COLPORRAPHY RPR CYSTOCELE W/CYSTO N/A 1/21/2020    Procedure: COLPORRHAPHY;  Surgeon: Dewey Sharpe MD;  Location: [facility];  Service: Gynecology    UT VAGINAL HYSTERECTOMY UTERUS 250 GM/< N/A 1/21/2020    Procedure: HYSTERECTOMY VAGINAL TOTAL (TVH) left salpingectomy;  Surgeon: Dewey Sharpe MD;  Location: [facility];  Service: Gynecology    WRIST FRACTURE SURGERY Right        History reviewed. No pertinent family history.  I have reviewed and agree with the history as documented.    E-Cigarette/Vaping    E-Cigarette Use Never User      E-Cigarette/Vaping Substances     Social History     Tobacco Use    Smoking status: Never    Smokeless tobacco: Never   Vaping Use    Vaping status: Never Used   Substance Use Topics    Alcohol use: Not Currently    Drug use: Never        Review of Systems   Unable to perform ROS: Dementia       Physical Exam  ED Triage Vitals [07/16/24 1154]   Temperature Pulse Respirations Blood Pressure SpO2   99 °F (37.2 °C) 89 22 134/62 98 %      Temp Source Heart Rate Source Patient Position - Orthostatic VS BP Location FiO2 (%)   Rectal Monitor -- Right arm --      Pain Score       --             Orthostatic Vital Signs  Vitals:    07/16/24 1154 07/16/24 1230 07/16/24 1300   BP: 134/62 160/72 130/62   Pulse: 89 95 88       Physical Exam  Vitals and nursing note reviewed.   Constitutional:       General: She is not in acute  distress.     Appearance: She is well-developed.   HENT:      Head: Normocephalic and atraumatic.   Eyes:      Conjunctiva/sclera: Conjunctivae normal.   Cardiovascular:      Rate and Rhythm: Normal rate and regular rhythm.      Heart sounds: No murmur heard.  Pulmonary:      Effort: Pulmonary effort is normal. No respiratory distress.      Breath sounds: Normal breath sounds.   Abdominal:      Palpations: Abdomen is soft.      Tenderness: There is no abdominal tenderness.   Musculoskeletal:         General: No swelling.      Comments: Bony abnormality at the left hip, unable to tell if tender, patient has very little muscle mass diffusely.   Skin:     General: Skin is warm and dry.      Comments: No bruising or lacerations noted   Neurological:      Mental Status: She is alert. Mental status is at baseline. She is disoriented.         ED Medications  Medications - No data to display    Diagnostic Studies  Results Reviewed       None                   CT head without contrast   Final Result by Colin Davison MD (07/16 6404)      No acute intracranial abnormality.  Chronic microangiopathic changes.                  Workstation performed: [id number]         CT cervical spine without contrast   Final Result by Colin Davison MD (07/16 1503)      No cervical spine fracture or traumatic malalignment.                  Workstation performed: [id number]         XR chest portable   ED Interpretation by Beatriz Calzada DO (07/16 7163)   No acute cardiopulmonary abnormality on my independent interpretation, although incomplete study - cannot visualize right clavicle completely      Final Result by Herbert Guallpa MD (07/16 8830)      No acute cardiopulmonary disease.      This report is in agreement with the preliminary interpretation.         Workstation performed: [id number]         XR hip/pelv 2-3 vws left if performed   ED Interpretation by Beatriz Calzada DO (07/16 0881)   Severe degenerative  changes, otherwise no acute fracture or dislocation      Final Result by Herbert Guallpa MD (07/16 1516)      No acute osseous abnormality.      Degenerative changes as described.      This report is in agreement with the preliminary interpretation.      Computerized Assisted Algorithm (CAA) may have been used to analyze all applicable images.            Workstation performed: [id number]               Procedures  Procedures      ED Course                             SBIRT 20yo+      Flowsheet Row Most Recent Value   Initial Alcohol Screen: US AUDIT-C     1. How often do you have a drink containing alcohol? 0 Filed at: 07/16/2024 1156   2. How many drinks containing alcohol do you have on a typical day you are drinking?  0 Filed at: 07/16/2024 1156   3b. FEMALE Any Age, or MALE 65+: How often do you have 4 or more drinks on one occassion? 0 Filed at: 07/16/2024 1156   Audit-C Score 0 Filed at: 07/16/2024 1156   LORY: How many times in the past year have you...    Used an illegal drug or used a prescription medication for non-medical reasons? Never Filed at: 07/16/2024 1156                  Medical Decision Making  83-year-old female patient presenting with abnormal bony protrusion of the left hip.  This is in the absence of any obvious, known fall.  However, there is potential that fall could have been missed and so did want to evaluate for acute traumatic injury or spontaneous fracture or dislocation.  CT scans of the head and cervical spine were done and showed no acute abnormality.  X-rays done of the left hip showed severe degenerative changes but again no acute fracture or dislocation.  There are no overlying skin changes, rash, bruising, other obvious signs of trauma there.  The area is not warm and suspicious for infection.  Area being palpated is likely greater trochanter of the left hip which is more palpable due to patient's minimal muscle mass in the area due to being chronically bedridden.  The patient's   was counseled on this result and she was ultimately discharged in stable condition back to nursing facility with counseling to follow with the patient's primary care physician and directions to continue to monitor the area for any changes.    Problems Addressed:  Ambulatory dysfunction: acute illness or injury  Hip osteoarthritis: acute illness or injury    Amount and/or Complexity of Data Reviewed  Radiology: ordered and independent interpretation performed.          Disposition  Final diagnoses:   Ambulatory dysfunction   Hip osteoarthritis     Time reflects when diagnosis was documented in both MDM as applicable and the Disposition within this note       Time User Action Codes Description Comment    7/16/2024  2:55 PM Beatriz Calzada [R26.2] Ambulatory dysfunction     7/16/2024  2:56 PM Beatriz Calzada [M16.9] Hip osteoarthritis           ED Disposition       ED Disposition   Discharge    Condition   Stable    Date/Time   Tue Jul 16, 2024 [id number]    Comment   Elsi Bo discharge to home/self care.                   Follow-up Information       Follow up With Specialties Details Why Contact Info Additional Information    Mauro Coronel MD Internal Medicine Call  As needed [address] [place] [phone]  [phone]       [facility] Emergency Medicine Go to  If symptoms worsen [address] [place] [id number]  [phone] [facility], [address] [place] [address], [phone]            Patient's Medications   Discharge Prescriptions    No medications on file     No discharge procedures on file.    PDMP Review         Value Time User    PDMP Reviewed  Yes 12/10/2023  1:58 PM DEBRA Maria             ED Provider  Attending physically available and evaluated Elsi Bo. I managed the patient along with the ED Attending.    Electronically Signed by            Beatriz Calzada,   07/17/24 2027

## 2024-07-21 PROBLEM — E46 PROTEIN MALNUTRITION (HCC): Status: ACTIVE | Noted: 2023-01-01

## 2024-07-21 PROBLEM — M72.6 NECROTIZING FASCIITIS (HCC): Status: ACTIVE | Noted: 2024-01-01

## 2024-07-21 PROBLEM — Z51.5: Status: ACTIVE | Noted: 2024-01-01

## 2024-07-21 PROBLEM — R93.89 ABNORMAL CT OF THE CHEST: Status: ACTIVE | Noted: 2024-01-01

## 2024-07-21 NOTE — ASSESSMENT & PLAN NOTE
Patient is an 83-year-old female with a past medical history of Alzheimer's dementia with behavioral disturbance and is nonverbal, who presents with an inoperable sacral ulcer with necrotizing fasciitis.  Per general surgery, bedside debridement was unsuccessful and intolerable to the patient.  After conversation with patient's , the decision was made to make patient comfort care only.  CT abdomen significant for patchy soft tissue gas and mild debris with concern for necrotizing fasciitis/myositis  Patient has frequent high fevers   white blood cell count elevated at 24.33 with segmental neutrophils elevated at 88%  Pro-Brandin elevated at 0.27  UA positive for UTI  Patient meets criteria for sepsis    PLAN:  Fentanyl 2 mcg as needed for pain every 2 hours  Glycopyrrolate as needed secretions  1mg Ativan as needed for agitation/anxiety  Consult placed to comfort care.  Pleasure feedings  Holding at home medications except for Seroquel and gabapentin.    No antibiotics or fluids indicated given comfort care status.

## 2024-07-21 NOTE — H&P
[facility]  H&P  Name: Elsi Bo 83 y.o. female I MRN: [id number]  Unit/Bed#: ED-24 I Date of Admission: 7/21/2024   Date of Service: 7/21/2024 I Hospital Day: 0      Assessment & Plan   * Necrotizing fasciitis (HCC)  Assessment & Plan  Patient is an 83-year-old female with a past medical history of Alzheimer's dementia with behavioral disturbance and is nonverbal, who presents with an inoperable sacral ulcer with necrotizing fasciitis.  Per general surgery, bedside debridement was unsuccessful and intolerable to the patient.  After conversation with patient's , the decision was made to make patient comfort care only.  CT abdomen significant for patchy soft tissue gas and mild debris with concern for necrotizing fasciitis/myositis  Patient has frequent high fevers   white blood cell count elevated at 24.33 with segmental neutrophils elevated at 88%  Pro-Brandin elevated at 0.27  UA positive for UTI  Patient meets criteria for sepsis    PLAN:  Fentanyl 2 mcg as needed for pain every 2 hours  Glycopyrrolate as needed secretions  1mg Ativan as needed for agitation/anxiety  Consult placed to comfort care.  Pleasure feedings  Holding at home medications except for Seroquel and gabapentin.    No antibiotics or fluids indicated given comfort care status.      Abnormal CT of the chest  Assessment & Plan  CT chest significant for bronchiolitis.    Comfort care measures    Debility  Assessment & Plan  Patient is severely disabled given significant comorbid conditions and the presence of new and acute necrotizing fasciitis leading to sepsis  Continue comfort care goals  Patient will be discharged home to [facility] at noon tomorrow    Protein malnutrition (HCC)  Assessment & Plan  Malnutrition Findings:                                 BMI Findings:           Body mass index is 19.88 kg/m².     Pleasure feeds as tolerated       Alzheimer's dementia with behavioral disturbance  ([facility])  Assessment & Plan  Patient is nonverbal and has history of Alzheimer's dementia with behavioral disturbance.    Continue to monitor for any behavioral disturbances and treat with 1 mg Ativan accordingly  Continue at home Seroquel       Hypertension  Assessment & Plan  Hold at home blood pressure medications           VTE Pharmacologic Prophylaxis:    comfort care  Code Status: Level 4 - Comfort Care   Discussion with family: Updated  ( and daughter) at bedside.    Anticipated Length of Stay: Patient will be admitted on an inpatient basis with an anticipated length of stay of greater than 2 midnights secondary to comfort care measures.    Chief Complaint: Inoperable necrotizing fasciitis    History of Present Illness:  Elsi Bo is a 83 y.o. female with a PMH of Alzheimer dementia with behavioral disturbances who presents with significant sacral ulcer suspicious for necrotizing fasciitis.  Patient's  spoke to general surgery at length and had the difficult conversation of patient being a poor candidate for the OR given her significant comorbidities.  General surgery attempted bedside debridement of the wound but it was poorly tolerated by patient and did not offer significant benefit.  Patient's  decided to make patient comfort care only.  She is being admitted today as we were unable to orchestrate transfer with Joleenkendra Sandy this evening.  Patient will be transferred back to [facility] tomorrow around noon.    Review of Systems:  Review of Systems   Unable to perform ROS: Patient unresponsive       Past Medical and Surgical History:   Past Medical History:   Diagnosis Date    Altered mental status 11/01/2023    Constipation 11/01/2023    Dementia ([facility])     Hypertension        Past Surgical History:   Procedure Laterality Date    COLONOSCOPY      DILATION AND CURETTAGE OF UTERUS      AL ANTERIOR COLPORRAPHY RPR CYSTOCELE W/CYSTO N/A 1/21/2020    Procedure: COLPORRHAPHY;   Surgeon: Dewey Sharpe MD;  [facility];  Service: Gynecology    [place] VAGINAL HYSTERECTOMY UTERUS 250 GM/< N/A 1/21/2020    Procedure: HYSTERECTOMY VAGINAL TOTAL (TVH) left salpingectomy;  Surgeon: Dewey Sharpe MD;  Location: [facility];  Service: Gynecology    WRIST FRACTURE SURGERY Right        Meds/Allergies:  Prior to Admission medications    Medication Sig Start Date End Date Taking? Authorizing Provider   acetaminophen (TYLENOL) 325 mg tablet Take 3 tablets (975 mg total) by mouth every 8 (eight) hours 12/8/23   Darby Rice PA-C   Ascorbic Acid (vitamin C) 100 MG tablet Take 100 mg by mouth daily    Historical Provider, MD   bisacodyl (DULCOLAX) 10 mg suppository Insert 1 suppository (10 mg total) into the rectum daily as needed for constipation 11/2/23   Whitney Garduno MD   cholecalciferol (VITAMIN D3) 400 units tablet Take 400 Units by mouth daily    Historical Provider, MD   felodipine (PLENDIL) 10 MG 24 hr tablet Take 10 mg by mouth daily 5/31/19   Historical Provider, MD   gabapentin (NEURONTIN) 100 mg capsule Take 1 capsule (100 mg total) by mouth 3 (three) times a day 12/8/23   Darby Rice PA-C   LORazepam (Ativan) 0.5 mg tablet Take 0.5 tablets (0.25 mg total) by mouth 2 (two) times a day as needed for anxiety (Every 8 hours prn anxiety times 3 doses.)  Patient taking differently: Take 0.25 mg by mouth every 8 (eight) hours as needed for anxiety (Every 8 hours prn anxiety times.) 12/12/23   Dayna Vega,    melatonin 3 mg Take 1 tablet (3 mg total) by mouth 2 hours prior to bedtime. 11/29/23   New Schultz DO   polyethylene glycol (MIRALAX) 17 g packet Take 17 g by mouth daily Do not start before December 9, 2023. 12/9/23   Darby Rice PA-C   QUEtiapine (SEROquel) 25 mg tablet Take 1 tablet (25 mg total) by mouth daily at bedtime 12/8/23   Darby Rice PA-C   QUEtiapine (SEROquel) 25 mg tablet Take 12.5 mg by mouth daily    Historical  "Provider, MD   senna-docusate sodium (SENOKOT S) 8.6-50 mg per tablet Take 2 tablets by mouth 2 (two) times a day for 15 days 12/10/23 12/25/23  DEBRA Maria     Holding at-home medications due to comfort care status. Will continue patient's Seroquel and gabapentin as they are comfort medications..     Allergies:   Allergies   Allergen Reactions    Sulfa Antibiotics      Doesn't remember reaction       Social History:  Marital Status: /Civil Union   Occupation: na  Patient Pre-hospital Living Situation: Skilled Nursing Facility: [facility]  Patient Pre-hospital Level of Mobility: non-ambulatory/bed bound  Patient Pre-hospital Diet Restrictions: pleasure feeds only  Substance Use History:   Social History     Substance and Sexual Activity   Alcohol Use Not Currently     Social History     Tobacco Use   Smoking Status Never   Smokeless Tobacco Never     Social History     Substance and Sexual Activity   Drug Use Never       Family History:  History reviewed. No pertinent family history.    Physical Exam:     Vitals:   Blood Pressure: 116/56 (07/21/24 1458)  Pulse: 83 (07/21/24 1458)  Temperature: 100.2 °F (37.9 °C) (07/21/24 0919)  Temp Source: Rectal (07/21/24 0919)  Respirations: 16 (07/21/24 1458)  Height: 5' 5\" (165.1 cm) (07/21/24 0906)  Weight - Scale: 54.2 kg (119 lb 7.8 oz) (07/21/24 0906)  SpO2: 95 % (07/21/24 1458)    Physical Exam  Nursing note reviewed.     Physical exam deferred due to patient's condition and comfort care status.    Additional Data:     Lab Results:  Results from last 7 days   Lab Units 07/21/24  0913   WBC Thousand/uL 24.33*   HEMOGLOBIN g/dL 11.2*   HEMATOCRIT % 35.3   PLATELETS Thousands/uL 185   SEGS PCT % 88*   LYMPHO PCT % 6*   MONO PCT % 5   EOS PCT % 0     Results from last 7 days   Lab Units 07/21/24  0913   SODIUM mmol/L 141   POTASSIUM mmol/L 3.8   CHLORIDE mmol/L 104   CO2 mmol/L 27   BUN mg/dL 29*   CREATININE mg/dL 0.90   ANION GAP mmol/L 10   CALCIUM " mg/dL 8.9   ALBUMIN g/dL 3.2*   TOTAL BILIRUBIN mg/dL 0.58   ALK PHOS U/L 66   ALT U/L 10   AST U/L 12*   GLUCOSE RANDOM mg/dL 138     Results from last 7 days   Lab Units 07/21/24  0913   INR  1.20*         Lab Results   Component Value Date    HGBA1C 5.1 12/26/2023    HGBA1C 5.1 12/26/2023    HGBA1C 5.7 12/27/2019     Results from last 7 days   Lab Units 07/21/24  0913   LACTIC ACID mmol/L 1.5   PROCALCITONIN ng/ml 0.27*       Lines/Drains:  Invasive Devices       Peripheral Intravenous Line  Duration             Peripheral IV 07/21/24 Right Forearm <1 day                        Imaging: Personally reviewed the following imaging: chest CT scan  CT abdomen pelvis with contrast   Final Result by Srinivas Mcdaniel MD (07/21 2187)      1. Soft tissue defect posterior to the sacrum concerning for decubitus ulcer. Patchy soft tissue gas and mild debris in this region extending into the gluteal musculature bilaterally worse on the right raises concern for necrotizing fasciitis/myositis.   2. Infiltrative changes in the subcutaneous tissues superficial to the left greater trochanter with suggestion of a small fluid collection adjacent to the bone measuring 3.0 x 1.3 cm. Correlate for possible infection, small abscess is not excluded.   3. New trace right pleural effusion. New patchy clustered groundglass opacities in the right middle lobe anteriorly with tree-in-bud opacities suggesting bronchiolitis.      The study was marked in EPIC for immediate notification.      Workstation performed: [id number]         XR chest 1 view portable   ED Interpretation by Jessenia Sumner MD (07/21 1012)   No acute findings. Independently interpreted by myself.          EKG and Other Studies Reviewed on Admission:   EKG: Personally Reviewed.    ** Please Note: This note has been constructed using a voice recognition system. **

## 2024-07-21 NOTE — ASSESSMENT & PLAN NOTE
Patient is nonverbal and has history of Alzheimer's dementia with behavioral disturbance.    Continue to monitor for any behavioral disturbances and treat with 1 mg Ativan accordingly  Continue at home Seroquel

## 2024-07-21 NOTE — CONSULTS
Consultation - General Surgery   Elsi Bo 83 y.o. female MRN: [id number]  Unit/Bed#: ED-24 Encounter: [id number]    Assessment & Plan     Assessment:  82yo F with advanced stage dementia, and history of a chronic sacral wound being treated as outpatient, presents with signs of sepsis.  While the patient does have an active UTI, that could explain her sepsis, thorough examination of her sacral wound, are consistent with signs of infection with a likely NSTI.  A CT scan showed gas, debris, and soft tissue changes extending to the left greater trochanter, and bilateral gluteal muscles, worse on the right.      A detailed discussion was had with the patient's , painting out the best vs. worst-case scenario.  It was explained to him that if the sacral wound were not infected, and this was merely an unstageable ulcer, this would likely require multiple bedside debridements and is well as daily local wound care.  It was further explained that at worst, if this was indeed an NSTI, based off of what was seen on CT, the level of debridement required would likely result in removal of a significant degree of soft tissue from her bilateral gluteus, extending towards the trochanter.  The  was agreeable to bedside debridement, but stated that if things looked unfavorable, he would not want to go further with any surgical intervention.    The area in question was debrided at bedside around the diameter of the wound, measuring approximately 4 cm across.  Dark sanguinous-purulent fluid was seen with no visible healthy tissue down to the level of the bone.  The area was probed circumferentially and soft friable tissue was appreciated throughout the wound, with persistent drainage.  At this time any further debridement was aborted, and the discussion was had with the patient's  that doing anything more would require the OR.  Understanding the severity of the patient's condition, he made the choice to stop  any further intervention, and elected to proceed with comfort care.  Given the patient's current functional status and advanced dementia, this decision was supported, and no further intervention was carried out.    Plan:  - Comfort measures  - Palliative care consult  - Social work to assist with transition from [facility] to [facility]  - If admission is required, would recommend admission to medicine  - Local wound care with 4 x 4 packing and Allevyn dressing  - No further surgical intervention per family request    History of Present Illness   History, ROS and PFSH unobtainable from any source due to dementia.    HPI:  Elsi Bo is a 83 y.o. female with history of advanced age dementia, HTN, chronic constipation, chronic sacral wound, who presented with fevers.  History was provided by patient's .  Patient is a resident at [facility] facility.  She was noted to be febrile over the last 3 days, as high as 101, with decreased p.o. intake.  On arrival to the ED, she was noted to have a UTI.  Patient also has a chronic sacral wound which was receiving treatment at her nursing home facility.  Per the patient's  it was treated with Medihoney, and occasional debridement.  He states that the wound looks worse compared to the last time he saw it.    On exam patient is nonverbal and minimally responsive, but in no acute distress.  The sacral wound is approximately 3-4 cm in diameter, necrotic, fluctuant, with purulent/sanguinous fluid draining from the center.  There is no obvious crepitus, however CT scan does suggest concerns for an NSTI with gas, debris, and soft tissue changes extending to the left greater trochanter, and bilateral gluteal muscles, worse on the right.  Patient's vitals are within normal limits, however her WBC is 24.3 with an elevation in segmental neutrophils 88%.    Consult to surgery general  Consult performed by: Ludwin Curran MD  Consult  ordered by: Srinivas Dickson MD          Review of Systems   Unable to perform ROS: Dementia       Historical Information   Past Medical History:   Diagnosis Date    Altered mental status 11/01/2023    Constipation 11/01/2023    Dementia (HCC)     Hypertension      Past Surgical History:   Procedure Laterality Date    COLONOSCOPY      DILATION AND CURETTAGE OF UTERUS      [place] ANTERIOR COLPORRAPHY RPR CYSTOCELE W/CYSTO N/A 1/21/2020    Procedure: COLPORRHAPHY;  Surgeon: Dewey Sharpe MD;  [facility];  Service: Gynecology    [place] VAGINAL HYSTERECTOMY UTERUS 250 GM/< N/A 1/21/2020    Procedure: HYSTERECTOMY VAGINAL TOTAL (TVH) left salpingectomy;  Surgeon: Dewey Sharpe MD;  Location: [facility];  Service: Gynecology    WRIST FRACTURE SURGERY Right      Social History   Social History     Substance and Sexual Activity   Alcohol Use Not Currently     Social History     Substance and Sexual Activity   Drug Use Never     E-Cigarette/Vaping    E-Cigarette Use Never User      E-Cigarette/Vaping Substances     Social History     Tobacco Use   Smoking Status Never   Smokeless Tobacco Never     Family History: History reviewed. No pertinent family history.    Meds/Allergies   current meds:   Current Facility-Administered Medications   Medication Dose Route Frequency    vancomycin (VANCOCIN) 1,250 mg in sodium chloride 0.9 % 250 mL IVPB  25 mg/kg Intravenous Once    and PTA meds:   Prior to Admission Medications   Prescriptions Last Dose Informant Patient Reported? Taking?   Ascorbic Acid (vitamin C) 100 MG tablet   Yes No   Sig: Take 100 mg by mouth daily   LORazepam (Ativan) 0.5 mg tablet   No No   Sig: Take 0.5 tablets (0.25 mg total) by mouth 2 (two) times a day as needed for anxiety (Every 8 hours prn anxiety times 3 doses.)   Patient taking differently: Take 0.25 mg by mouth every 8 (eight) hours as needed for anxiety (Every 8 hours prn anxiety times.)   QUEtiapine (SEROquel) 25 mg tablet   No No   Sig: Take 1 tablet  "(25 mg total) by mouth daily at bedtime   QUEtiapine (SEROquel) 25 mg tablet   Yes No   Sig: Take 12.5 mg by mouth daily   acetaminophen (TYLENOL) 325 mg tablet   No No   Sig: Take 3 tablets (975 mg total) by mouth every 8 (eight) hours   bisacodyl (DULCOLAX) 10 mg suppository   No No   Sig: Insert 1 suppository (10 mg total) into the rectum daily as needed for constipation   cholecalciferol (VITAMIN D3) 400 units tablet   Yes No   Sig: Take 400 Units by mouth daily   felodipine (PLENDIL) 10 MG 24 hr tablet   Yes No   Sig: Take 10 mg by mouth daily   gabapentin (NEURONTIN) 100 mg capsule   No No   Sig: Take 1 capsule (100 mg total) by mouth 3 (three) times a day   melatonin 3 mg   No No   Sig: Take 1 tablet (3 mg total) by mouth 2 hours prior to bedtime.   polyethylene glycol (MIRALAX) 17 g packet   No No   Sig: Take 17 g by mouth daily Do not start before December 9, 2023.   senna-docusate sodium (SENOKOT S) 8.6-50 mg per tablet   No No   Sig: Take 2 tablets by mouth 2 (two) times a day for 15 days      Facility-Administered Medications: None     Allergies   Allergen Reactions    Sulfa Antibiotics      Doesn't remember reaction       Objective   First Vitals:   Blood Pressure: 138/73 (07/21/24 0906)  Pulse: 94 (07/21/24 0906)  Temperature: 100.2 °F (37.9 °C) (07/21/24 0906)  Temp Source: Rectal (07/21/24 0906)  Respirations: 16 (07/21/24 0906)  Height: 5' 5\" (165.1 cm) (07/21/24 0906)  Weight - Scale: 54.2 kg (119 lb 7.8 oz) (07/21/24 0906)  SpO2: 93 % (07/21/24 0906)    Current Vitals:   Blood Pressure: 116/56 (07/21/24 1458)  Pulse: 83 (07/21/24 1458)  Temperature: 100.2 °F (37.9 °C) (07/21/24 0919)  Temp Source: Rectal (07/21/24 0919)  Respirations: 16 (07/21/24 1458)  Height: 5' 5\" (165.1 cm) (07/21/24 0906)  Weight - Scale: 54.2 kg (119 lb 7.8 oz) (07/21/24 0906)  SpO2: 95 % (07/21/24 1458)      Intake/Output Summary (Last 24 hours) at 7/21/2024 1503  Last data filed at 7/21/2024 1410  Gross per 24 hour "   Intake 1200 ml   Output --   Net 1200 ml       Invasive Devices       Peripheral Intravenous Line  Duration             Peripheral IV 07/21/24 Left Forearm <1 day    Peripheral IV 07/21/24 Right Forearm <1 day                    Physical Exam  Vitals reviewed.   Constitutional:       Appearance: She is underweight. She is ill-appearing.   HENT:      Mouth/Throat:      Mouth: Mucous membranes are dry.   Eyes:      Pupils: Pupils are equal, round, and reactive to light.   Cardiovascular:      Rate and Rhythm: Normal rate.   Pulmonary:      Effort: No respiratory distress.   Abdominal:      General: Abdomen is flat.      Tenderness: There is no abdominal tenderness.   Musculoskeletal:      Comments: Sacral wound measuring approximately 4 cm across.  Dark sanguinous-purulent fluid was seen with no visible healthy tissue down to the level of the bone.  Malodorous.  The area was probed circumferentially and soft friable tissue was appreciated throughout the wound, with persistent drainage.    Neurological:      Mental Status: Mental status is at baseline.             Lab Results: I have personally reviewed pertinent lab results.  , CBC:   Lab Results   Component Value Date    WBC 24.33 (H) 07/21/2024    HGB 11.2 (L) 07/21/2024    HCT 35.3 07/21/2024    MCV 94 07/21/2024     07/21/2024    RBC 3.75 (L) 07/21/2024    MCH 29.9 07/21/2024    MCHC 31.7 07/21/2024    RDW 13.6 07/21/2024    MPV 11.6 07/21/2024    NRBC 0 07/21/2024   , CMP:   Lab Results   Component Value Date    SODIUM 141 07/21/2024    K 3.8 07/21/2024     07/21/2024    CO2 27 07/21/2024    BUN 29 (H) 07/21/2024    CREATININE 0.90 07/21/2024    CALCIUM 8.9 07/21/2024    AST 12 (L) 07/21/2024    ALT 10 07/21/2024    ALKPHOS 66 07/21/2024    EGFR 60 07/21/2024   , Coagulation:   Lab Results   Component Value Date    INR 1.20 (H) 07/21/2024   , Urinalysis:   Lab Results   Component Value Date    COLORU Yellow 07/21/2024    CLARITYU Turbid 07/21/2024     SPECGRAV 1.021 07/21/2024    PHUR 5.5 07/21/2024    LEUKOCYTESUR Small (A) 07/21/2024    NITRITE Positive (A) 07/21/2024    GLUCOSEU Negative 07/21/2024    KETONESU Negative 07/21/2024    BILIRUBINUR Negative 07/21/2024    BLOODU Trace (A) 07/21/2024     Imaging: I have personally reviewed pertinent reports.    CT abdomen pelvis with contrast    Result Date: 7/21/2024  Impression: 1. Soft tissue defect posterior to the sacrum concerning for decubitus ulcer. Patchy soft tissue gas and mild debris in this region extending into the gluteal musculature bilaterally worse on the right raises concern for necrotizing fasciitis/myositis. 2. Infiltrative changes in the subcutaneous tissues superficial to the left greater trochanter with suggestion of a small fluid collection adjacent to the bone measuring 3.0 x 1.3 cm. Correlate for possible infection, small abscess is not excluded. 3. New trace right pleural effusion. New patchy clustered groundglass opacities in the right middle lobe anteriorly with tree-in-bud opacities suggesting bronchiolitis. The study was marked in EPIC for immediate notification. Workstation performed: [id number]      EKG, Pathology, and Other Studies: I have personally reviewed pertinent reports.      Counseling / Coordination of Care  Total floor / unit time spent today 60 minutes.  Greater than 50% of total time was spent with the patient and / or family counseling and / or coordination of care.  A description of the counseling / coordination of care: N/A.

## 2024-07-21 NOTE — ASSESSMENT & PLAN NOTE
Patient is severely disabled given significant comorbid conditions and the presence of new and acute necrotizing fasciitis leading to sepsis  Continue comfort care goals  Patient will be discharged home to [facility] at noon tomorrow

## 2024-07-21 NOTE — ED PROVIDER NOTES
History  Chief Complaint   Patient presents with    Possible UTI     Pt is from [facility] Per ems pt was dx with UTI was put on Keflex for 5 days was not working was switched to Cipro 2 days ago, per  pt responsiveness  is baseline for her she is nonverbal      HPI    (Elsi Bo) Elsi Bo is a 83 y.o. female with a significant PMHx of dementia, nonverbal at baseline presents with her , who is her caregiver, to the emergency department on July 21, 2024 for fever onset 3 days ago.  Has been reports that for the past 3 to 4 days, patient has had decreased appetite, as well as fever up to 101.  She received Tylenol last night, with improvement of her fever.  Currently 100.2 on presentation.  At the facility yesterday,  states that they did a urine dip, and gave her 1 dose of an antibiotic, unsure which 1.   denies recent illnesses, cough, or noticing any other symptoms at this time.      Allergies include:  Allergies   Allergen Reactions    Sulfa Antibiotics      Doesn't remember reaction         Immunizations:  Immunization History   Administered Date(s) Administered    COVID-19 MODERNA VACC 0.5 ML IM 01/28/2021, 02/25/2021, 10/26/2021    INFLUENZA 10/07/2021, 10/10/2022, 11/21/2023    Influenza, high dose seasonal 0.7 mL 01/22/2020    Pneumococcal Conjugate 13-Valent 09/04/2020     Immunizations Reviewed.    Prior to Admission Medications   Prescriptions Last Dose Informant Patient Reported? Taking?   Ascorbic Acid (vitamin C) 100 MG tablet Unknown  Yes No   Sig: Take 100 mg by mouth daily   LORazepam (Ativan) 0.5 mg tablet Unknown  No No   Sig: Take 0.5 tablets (0.25 mg total) by mouth 2 (two) times a day as needed for anxiety (Every 8 hours prn anxiety times 3 doses.)   Patient taking differently: Take 0.25 mg by mouth every 8 (eight) hours as needed for anxiety (Every 8 hours prn anxiety times.)   QUEtiapine (SEROquel) 25 mg tablet Unknown  No No   Sig: Take 1 tablet (25  mg total) by mouth daily at bedtime   QUEtiapine (SEROquel) 25 mg tablet Unknown  Yes No   Sig: Take 12.5 mg by mouth daily   acetaminophen (TYLENOL) 325 mg tablet Unknown  No No   Sig: Take 3 tablets (975 mg total) by mouth every 8 (eight) hours   bisacodyl (DULCOLAX) 10 mg suppository Unknown  No No   Sig: Insert 1 suppository (10 mg total) into the rectum daily as needed for constipation   cholecalciferol (VITAMIN D3) 400 units tablet Unknown  Yes No   Sig: Take 400 Units by mouth daily   felodipine (PLENDIL) 10 MG 24 hr tablet Unknown  Yes No   Sig: Take 10 mg by mouth daily   gabapentin (NEURONTIN) 100 mg capsule Unknown  No No   Sig: Take 1 capsule (100 mg total) by mouth 3 (three) times a day   melatonin 3 mg Unknown  No No   Sig: Take 1 tablet (3 mg total) by mouth 2 hours prior to bedtime.   polyethylene glycol (MIRALAX) 17 g packet Unknown  No No   Sig: Take 17 g by mouth daily Do not start before December 9, 2023.   senna-docusate sodium (SENOKOT S) 8.6-50 mg per tablet   No No   Sig: Take 2 tablets by mouth 2 (two) times a day for 15 days      Facility-Administered Medications: None       Past Medical History:   Diagnosis Date    Altered mental status 11/01/2023    Constipation 11/01/2023    Dementia (HCC)     Hypertension        Past Surgical History:   Procedure Laterality Date    COLONOSCOPY      DILATION AND CURETTAGE OF UTERUS      MS ANTERIOR COLPORRAPHY RPR CYSTOCELE W/CYSTO N/A 1/21/2020    Procedure: COLPORRHAPHY;  Surgeon: Dewey Sharpe MD;  Location: [facility];  Service: Gynecology    MS VAGINAL HYSTERECTOMY UTERUS 250 GM/< N/A 1/21/2020    Procedure: HYSTERECTOMY VAGINAL TOTAL (TVH) left salpingectomy;  Surgeon: Dewey Sharpe MD;  Location: [facility];  Service: Gynecology    WRIST FRACTURE SURGERY Right        History reviewed. No pertinent family history.  I have reviewed and agree with the history as documented.    E-Cigarette/Vaping    E-Cigarette Use Never User      E-Cigarette/Vaping  Substances     Social History     Tobacco Use    Smoking status: Never    Smokeless tobacco: Never   Vaping Use    Vaping status: Never Used   Substance Use Topics    Alcohol use: Not Currently    Drug use: Never        Review of Systems   Unable to perform ROS: Dementia       Physical Exam  ED Triage Vitals   Temperature Pulse Respirations Blood Pressure SpO2   07/21/24 0906 07/21/24 0906 07/21/24 0906 07/21/24 0906 07/21/24 0906   100.2 °F (37.9 °C) 94 16 138/73 93 %      Temp Source Heart Rate Source Patient Position - Orthostatic VS BP Location FiO2 (%)   07/21/24 0906 07/21/24 1030 07/21/24 1100 07/21/24 1100 --   Rectal Monitor Sitting Right arm       Pain Score       --                    Orthostatic Vital Signs  Vitals:    07/21/24 1300 07/21/24 1330 07/21/24 1458 07/21/24 1700   BP: 105/54 114/68 116/56 110/57   Pulse: 73 81 83 81   Patient Position - Orthostatic VS: Lying Lying Sitting Lying       Physical Exam  Vitals and nursing note reviewed.   Constitutional:       Appearance: She is not toxic-appearing or diaphoretic.   HENT:      Head: Normocephalic and atraumatic.      Right Ear: External ear normal.      Left Ear: External ear normal.      Nose: Nose normal.      Mouth/Throat:      Mouth: Mucous membranes are dry.   Eyes:      General: No scleral icterus.     Extraocular Movements: Extraocular movements intact.      Conjunctiva/sclera: Conjunctivae normal.      Pupils: Pupils are equal, round, and reactive to light.   Cardiovascular:      Rate and Rhythm: Normal rate and regular rhythm.      Pulses: Normal pulses.           Radial pulses are 2+ on the right side.        Dorsalis pedis pulses are 2+ on the right side and 2+ on the left side.      Heart sounds: Normal heart sounds, S1 normal and S2 normal. No murmur heard.  Pulmonary:      Effort: Pulmonary effort is normal. No respiratory distress.      Breath sounds: Normal breath sounds. No stridor. No wheezing.   Abdominal:      General: Bowel  sounds are normal.      Palpations: Abdomen is soft.      Tenderness: There is no abdominal tenderness. There is guarding. There is no rebound.   Musculoskeletal:         General: Normal range of motion.      Cervical back: Normal range of motion.   Skin:     General: Skin is warm and dry.      Comments: Sacral decubitus wound with crepitance on palpation.   Neurological:      Mental Status: Mental status is at baseline.   Psychiatric:         Mood and Affect: Mood normal.          Media Information      Document Information    Clinical Image - Mobile Device      07/21/2024 10:15   Attached To:   Hospital Encounter on 7/21/24   Source Information    Jessenia Sumner MD  An Ed   Document History      ED Medications  Medications   fentaNYL injection 25 mcg (has no administration in time range)   glycopyrrolate (ROBINUL) injection 0.1 mg (has no administration in time range)   bisacodyl (DULCOLAX) rectal suppository 10 mg (has no administration in time range)   acetaminophen (TYLENOL) rectal suppository 650 mg (has no administration in time range)   LORazepam (ATIVAN) injection 1 mg (has no administration in time range)   ceftriaxone (ROCEPHIN) 2 g/50 mL in dextrose IVPB (0 mg Intravenous Stopped 7/21/24 1038)   acetaminophen (Ofirmev) injection 1,000 mg (0 mg Intravenous Stopped 7/21/24 1015)   sodium chloride 0.9 % bolus 1,000 mL (0 mL Intravenous Stopped 7/21/24 1131)   iohexol (OMNIPAQUE) 350 MG/ML injection (MULTI-DOSE) 70 mL (70 mL Intravenous Given 7/21/24 1046)   vancomycin (VANCOCIN) 1,250 mg in sodium chloride 0.9 % 250 mL IVPB (0 mg Intravenous Stopped 7/21/24 1620)   piperacillin-tazobactam (ZOSYN) IVPB 4.5 g (0 g Intravenous Stopped 7/21/24 1410)       Diagnostic Studies  Results Reviewed       Procedure Component Value Units Date/Time    Blood culture #1 [[id number]] Collected: 07/21/24 0913    Lab Status: Preliminary result Specimen: Blood from Arm, Left Updated: 07/21/24 1301     Blood Culture Received in  Microbiology Lab. Culture in Progress.    Blood culture #2 [[id number]] Collected: 07/21/24 0913    Lab Status: Preliminary result Specimen: Blood from Arm, Right Updated: 07/21/24 1301     Blood Culture Received in Microbiology Lab. Culture in Progress.    Urine Microscopic [[id number]]  (Abnormal) Collected: 07/21/24 1008    Lab Status: Final result Specimen: Urine, Straight Cath Updated: 07/21/24 1040     RBC, UA 1-2 /hpf      WBC, UA 10-20 /hpf      Epithelial Cells Occasional /hpf      Bacteria, UA Innumerable /hpf     Urine culture [[id number]] Collected: 07/21/24 1008    Lab Status: In process Specimen: Urine, Straight Cath Updated: 07/21/24 1040    UA w Reflex to Microscopic w Reflex to Culture [[id number]]  (Abnormal) Collected: 07/21/24 1008    Lab Status: Final result Specimen: Urine, Straight Cath Updated: 07/21/24 1035     Color, UA Yellow     Clarity, UA Turbid     Specific Gravity, UA 1.021     pH, UA 5.5     Leukocytes, UA Small     Nitrite, UA Positive     Protein, UA 50 (1+) mg/dl      Glucose, UA Negative mg/dl      Ketones, UA Negative mg/dl      Urobilinogen, UA <2.0 mg/dl      Bilirubin, UA Negative     Occult Blood, UA Trace    Comprehensive metabolic panel [[id number]]  (Abnormal) Collected: 07/21/24 0913    Lab Status: Final result Specimen: Blood from Arm, Left Updated: 07/21/24 1023     Sodium 141 mmol/L      Potassium 3.8 mmol/L      Chloride 104 mmol/L      CO2 27 mmol/L      ANION GAP 10 mmol/L      BUN 29 mg/dL      Creatinine 0.90 mg/dL      Glucose 138 mg/dL      Calcium 8.9 mg/dL      Corrected Calcium 9.5 mg/dL      AST 12 U/L      ALT 10 U/L      Alkaline Phosphatase 66 U/L      Total Protein 6.7 g/dL      Albumin 3.2 g/dL      Total Bilirubin 0.58 mg/dL      eGFR 60 ml/min/1.73sq m     Narrative:      National Kid[employer] Disease Foundation guidelines for Chronic Kidney Disease (CKD):     Stage 1 with normal or high GFR (GFR > 90 mL/min/1.73 square meters)    Stage 2 Mild CKD (GFR = 60-89  mL/min/1.73 square meters)    Stage 3A Moderate CKD (GFR = 45-59 mL/min/1.73 square meters)    Stage 3B Moderate CKD (GFR = 30-44 mL/min/1.73 square meters)    Stage 4 Severe CKD (GFR = 15-29 mL/min/1.73 square meters)    Stage 5 End Stage CKD (GFR <15 mL/min/1.73 square meters)  Note: GFR calculation is accurate only with a steady state creatinine    Lactic acid [[id number]]  (Normal) Collected: 07/21/24 0913    Lab Status: Final result Specimen: Blood from Arm, Right Updated: 07/21/24 1023     LACTIC ACID 1.5 mmol/L     Narrative:      Result may be elevated if tourniquet was used during collection.    Procalcitonin [[id number]]  (Abnormal) Collected: 07/21/24 0913    Lab Status: Final result Specimen: Blood from Arm, Left Updated: 07/21/24 1021     Procalcitonin 0.27 ng/ml     Protime-INR [[id number]]  (Abnormal) Collected: 07/21/24 0913    Lab Status: Final result Specimen: Blood from Arm, Left Updated: 07/21/24 1020     Protime 15.9 seconds      INR 1.20    APTT [[id number]]  (Normal) Collected: 07/21/24 0913    Lab Status: Final result Specimen: Blood from Arm, Left Updated: 07/21/24 1020     PTT 31 seconds     CBC and differential [[id number]]  (Abnormal) Collected: 07/21/24 0913    Lab Status: Final result Specimen: Blood from Arm, Right Updated: 07/21/24 1001     WBC 24.33 Thousand/uL      RBC 3.75 Million/uL      Hemoglobin 11.2 g/dL      Hematocrit 35.3 %      MCV 94 fL      MCH 29.9 pg      MCHC 31.7 g/dL      RDW 13.6 %      MPV 11.6 fL      Platelets 185 Thousands/uL      nRBC 0 /100 WBCs      Segmented % 88 %      Immature Grans % 1 %      Lymphocytes % 6 %      Monocytes % 5 %      Eosinophils Relative 0 %      Basophils Relative 0 %      Absolute Neutrophils 21.34 Thousands/µL      Absolute Immature Grans 0.28 Thousand/uL      Absolute Lymphocytes 1.41 Thousands/µL      Absolute Monocytes 1.21 Thousand/µL      Eosinophils Absolute 0.01 Thousand/µL      Basophils Absolute 0.08 Thousands/µL                     CT abdomen pelvis with contrast   Final Result by Srinivas Mcdaniel MD (07/21 9556)      1. Soft tissue defect posterior to the sacrum concerning for decubitus ulcer. Patchy soft tissue gas and mild debris in this region extending into the gluteal musculature bilaterally worse on the right raises concern for necrotizing fasciitis/myositis.   2. Infiltrative changes in the subcutaneous tissues superficial to the left greater trochanter with suggestion of a small fluid collection adjacent to the bone measuring 3.0 x 1.3 cm. Correlate for possible infection, small abscess is not excluded.   3. New trace right pleural effusion. New patchy clustered groundglass opacities in the right middle lobe anteriorly with tree-in-bud opacities suggesting bronchiolitis.      The study was marked in EPIC for immediate notification.      Workstation performed: [id number]         XR chest 1 view portable   ED Interpretation by Jessenia Sumner MD (07/21 1012)   No acute findings. Independently interpreted by myself.            Procedures  ECG 12 Lead Documentation Only    Date/Time: 7/21/2024 10:30 AM    Performed by: Jessenia Sumner MD  Authorized by: Jessenia Sumner MD    ECG reviewed by me, the ED Provider: yes    Patient location:  ED  Previous ECG:     Previous ECG:  Compared to current    Comparison ECG info:  12/6/2023    Similarity:  Changes noted    Comparison to cardiac monitor: Yes    Quality:     Tracing quality:  Limited by artifact  Rate:     ECG rate:  87    ECG rate assessment: normal    Rhythm:     Rhythm: sinus rhythm    QRS:     QRS axis:  Left    QRS intervals:  Normal  Conduction:     Conduction: normal    ST segments:     ST segments:  Normal  T waves:     T waves: non-specific and flattening    Comments:      Tendency towards low voltage QRS        ED Course  ED Course as of 07/21/24 1914   Sun Jul 21, 2024   1000 DDx including but not limited to: viral illness, pneumonia, URI, influenza, cellulitis, UTI     1003  WBC(!): 24.33  Will scan abdomen.  Ceftriaxone ordered as well.   1012 XR chest 1 view portable  No acute findings. Independently interpreted by myself.   1023 Procalcitonin(!): 0.27   1023 LACTIC ACID: 1.5  Does not meet severe sepsis criteria   1310 Nitrite, UA(!): Positive   1316 CT abdomen pelvis with contrast  1. Soft tissue defect posterior to the sacrum concerning for decubitus ulcer. Patchy soft tissue gas and mild debris in this region extending into the gluteal musculature bilaterally worse on the right raises concern for necrotizing fasciitis/myositis.  2. Infiltrative changes in the subcutaneous tissues superficial to the left greater trochanter with suggestion of a small fluid collection adjacent to the bone measuring 3.0 x 1.3 cm. Correlate for possible infection, small abscess is not excluded.  3. New trace right pleural effusion. New patchy clustered groundglass opacities in the right middle lobe anteriorly with tree-in-bud opacities suggesting bronchiolitis.   1333 Given patient's possible nec fasc around sacral decubitus ulcer, will reach out to surgery for recs.   1419 Reached out to surgery again. Pending response.   1509 Per surgery, patient is now comfort care. Verified with patient's  at bedside. Reports daughter is on the way.   1601 Spoke with  and daughter at bedside. Declines abx and wants to focus on comfort. Attempted to call facility, without response. Will admit to [facility] for hospice.   1602 Discussed results with patient's family and need for admission. Family voices understanding and agrees with plan. All questions were addressed. No other concerns at this time.    Discussed patient's case with Dr. Dickson ([facility]) regarding admission who accepted the patient for further evaluation and management.                            Initial Sepsis Screening       Row Name 07/21/24 1031                Is the patient's history suggestive of a new or worsening infection? Yes (Proceed)   -BT        Suspected source of infection urinary tract infection;wound infection  -BT        Indicate SIRS criteria Tachycardia > 90 bpm;Leukocytosis (WBC > 74759 IJL) OR Leukopenia (WBC <4000 IJL) OR Bandemia (WBC >10% bands)  -BT        Are two or more of the above signs & symptoms of infection both present and new to the patient? Yes (Proceed)  -BT        Assess for evidence of organ dysfunction: Are any of the below criteria present within 6 hours of suspected infection and SIRS criteria that are NOT considered to be chronic conditions? --                  User Key  (r) = Recorded By, (t) = Taken By, (c) = Cosigned By      Initials Name Provider Type    STEF Sumner MD Resident                   Initial Sepsis Screening       Row Name 07/21/24 1031                Is the patient's history suggestive of a new or worsening infection? Yes (Proceed)  -BT        Suspected source of infection urinary tract infection;wound infection  -BT        Indicate SIRS criteria Tachycardia > 90 bpm;Leukocytosis (WBC > 89563 IJL) OR Leukopenia (WBC <4000 IJL) OR Bandemia (WBC >10% bands)  -BT        Are two or more of the above signs & symptoms of infection both present and new to the patient? Yes (Proceed)  -BT        Assess for evidence of organ dysfunction: Are any of the below criteria present within 6 hours of suspected infection and SIRS criteria that are NOT considered to be chronic conditions? --                  User Key  (r) = Recorded By, (t) = Taken By, (c) = Cosigned By      Initials Name Provider Type    STEF Sumner MD Resident                    SBIRT 22yo+      Flowsheet Row Most Recent Value   Initial Alcohol Screen: US AUDIT-C     1. How often do you have a drink containing alcohol? 0 Filed at: 07/21/2024 1138   2. How many drinks containing alcohol do you have on a typical day you are drinking?  0 Filed at: 07/21/2024 1138   3b. FEMALE Any Age, or MALE 65+: How often do you have 4 or more drinks on one  occassion? 0 Filed at: 07/21/2024 1138   Audit-C Score 0 Filed at: 07/21/2024 1138   LORY: How many times in the past year have you...    Used an illegal drug or used a prescription medication for non-medical reasons? Never Filed at: 07/21/2024 1138                  Medical Decision Making  Amount and/or Complexity of Data Reviewed  Labs: ordered. Decision-making details documented in ED Course.  Radiology: ordered and independent interpretation performed. Decision-making details documented in ED Course.    Risk  Prescription drug management.  Decision regarding hospitalization.      See ED course for MDM.      Disposition  Final diagnoses:   Sacral decubitus ulcer   Necrotizing fasciitis (HCC)   SIRS (systemic inflammatory response syndrome) (HCC)   UTI (urinary tract infection)   Dementia (HCC)     Time reflects when diagnosis was documented in both MDM as applicable and the Disposition within this note       Time User Action Codes Description Comment    7/21/2024  3:10 PM Burak, Jessenia Add [L89.159] Sacral decubitus ulcer     7/21/2024  3:29 PM Cardio Srinivas Modify [L89.159] Sacral decubitus ulcer     7/21/2024  3:29 PM Cardio Srinivas Add [M72.6] Necrotizing fasciitis (HCC)     7/21/2024  3:29 PM Cardio Srinivas Add [R53.81] Physical debility     7/21/2024  3:29 PM Cardio Srinivas Add [G30.9,  F02.818] Alzheimer's dementia with behavioral disturbance (HCC)     7/21/2024  3:29 PM Cardio Srinivas Add [E44.1] Mild protein-calorie malnutrition (HCC)     7/21/2024  4:00 PM Burak Jessenia Add [R65.10] SIRS (systemic inflammatory response syndrome) (HCC)     7/21/2024  7:10 PM Burak Jessenia Modify [M72.6] Necrotizing fasciitis (HCC)     7/21/2024  7:11 PM Burak Jessenia Add [N39.0] UTI (urinary tract infection)     7/21/2024  7:11 PM Burak, Jessenia Add [F03.90] Dementia (HCC)           ED Disposition       ED Disposition   Admit    Condition   Stable    Date/Time   Sun Jul 21, 2024 1551    Comment   Case was discussed with RITA  and the patient's admission status was agreed to be Admission Status: inpatient status to the service of Dr. Dickson.               Follow-up Information    None         Patient's Medications   Discharge Prescriptions    No medications on file     No discharge procedures on file.    PDMP Review         Value Time User    PDMP Reviewed  Yes 12/10/2023  1:58 PM DEBRA Maria             ED Provider  Attending physically available and evaluated Elsi Bo. I managed the patient along with the ED Attending.    Electronically Signed by       Jessenia Sumner MD  07/21/24 7159

## 2024-07-21 NOTE — ASSESSMENT & PLAN NOTE
Malnutrition Findings:                                 BMI Findings:           Body mass index is 19.88 kg/m².     Pleasure feeds as tolerated

## 2024-07-21 NOTE — CASE MANAGEMENT
Case Management Progress Note    Patient name Elsi Bo  Location ED-24/ED-24 MRN [id number]  : 1940 Date 2024       LOS (days): 0  Geometric Mean LOS (GMLOS) (days):   Days to GMLOS:        OBJECTIVE:        Current admission status: Inpatient  Preferred Pharmacy:   [facility]/pharmacy #[id number] - [place] - [address]  [facility] [place] [address]  Phone: [phone] Fax: [phone]    [facility] [place]  [address]  [address]  [address]  [facility] [place] [facility] [address]  Phone: [phone] Fax: [phone]    Primary Care Provider: Mauro Coronel MD    Primary Insurance: [employer]  Secondary Insurance:     PROGRESS NOTE:    Family requesting  hospice referral, placed in Aidin.

## 2024-07-21 NOTE — QUICK NOTE
I, Dr. Srinivas Dickson, performed an independent history and physical exam of patient. I have done an independent review of the patient's record and discussed the case in detail with the Resident. I agree with the Resident's documented findings and plan of care as outlined in their note with additions/exceptions as follows:       A:  # Comfort care measures  # Sepsis, POA, 2/2 necrotizing soft tissue infection of sacral region s/p attempt at bedside debridement by Gen sx, +/- contributing UTI  # Abnormal CT chest concerning for bronchiolitis   # Advanced Alzheimer's dementia with behavioral disturbance, non-verbal at baseline, resident of [facility] ([facility])  # Debility  # HTN  # Mild protein calorie malnutrition    P:  Patient was evaluated by the general surgical service given concerns for an extensive NSTI of the sacral region.  Attempts at bedside debridement revealed extensive infectious/non-viable tissue down to the bone with persistent drainage.  Discussed an attempt at OR surgical intervention though given the extent of the infection and her medical comorbidities and overall functional status, it was decided by family to transition her to comfort care at this time.    Monitor off further abx  Comfort care Level 4 code status  Comfort medications ordered  Family has preference for patient to return to [facility] for comfort care, hospice liaison is aware and will help facilitate this tomorrow; they spoke with  via telephone today.  CM to arrange tentative noon transport on 7/22/24 to facilitate this transition to hospice.  Please reach out to CM early in AM on 7/22 to discuss stability for transport prior to them facilitating the transport.  This will be determined by her clinical stability over the next 24 hours.

## 2024-07-21 NOTE — ED NOTES
Patient sleeping comfortably, observed even and unlabored breathing. Call bell within reach. Family at bedside. Will continue to monitor.      Aide Lizarraga RN  07/21/24 0297

## 2024-07-21 NOTE — ASSESSMENT & PLAN NOTE
Patient is an 83-year-old female with a past medical history of Alzheimer's dementia with behavioral disturbance and is nonverbal, who presents with an inoperable sacral ulcer with necrotizing fasciitis.  Per general surgery, bedside debridement was unsuccessful and intolerable to the patient.  After conversation with patient's , the decision was made to make patient comfort care only.  CT abdomen significant for patchy soft tissue gas and mild debris with concern for necrotizing fasciitis/myositis  Patient has frequent high fevers   white blood cell count elevated at 24.33 with segmental neutrophils elevated at 88%  Pro-Brandin elevated at 0.27  UA positive for UTI

## 2024-07-22 NOTE — PLAN OF CARE
Problem: PAIN - ADULT  Goal: Verbalizes/displays adequate comfort level or baseline comfort level  Description: Interventions:  - Encourage patient to monitor pain and request assistance  - Assess pain using appropriate pain scale  - Administer analgesics based on type and severity of pain and evaluate response  - Implement non-pharmacological measures as appropriate and evaluate response  - Consider cultural and social influences on pain and pain management  - Notify physician/advanced practitioner if interventions unsuccessful or patient reports new pain  Outcome: Adequate for Discharge     Problem: INFECTION - ADULT  Goal: Absence or prevention of progression during hospitalization  Description: INTERVENTIONS:  - Assess and monitor for signs and symptoms of infection  - Monitor lab/diagnostic results  - Monitor all insertion sites, i.e. indwelling lines, tubes, and drains  - Monitor endotracheal if appropriate and nasal secretions for changes in amount and color  - [place] appropriate cooling/warming therapies per order  - Administer medications as ordered  - Instruct and encourage patient and family to use good hand hygiene technique  - Identify and instruct in appropriate isolation precautions for identified infection/condition  Outcome: Adequate for Discharge  Goal: Absence of fever/infection during neutropenic period  Description: INTERVENTIONS:  - Monitor WBC    Outcome: Adequate for Discharge     Problem: SAFETY ADULT  Goal: Patient will remain free of falls  Description: INTERVENTIONS:  - Educate patient/family on patient safety including physical limitations  - Instruct patient to call for assistance with activity   - Consult OT/PT to assist with strengthening/mobility   - Keep Call bell within reach  - Keep bed low and locked with side rails adjusted as appropriate  - Keep care items and personal belongings within reach  - Initiate and maintain comfort rounds  - Make Fall Risk Sign visible to  staff  - Offer Toileting every  Hours, in advance of need  - Initiate/Maintain alarm  - Obtain necessary fall risk management equipment:   - Apply yellow socks and bracelet for high fall risk patients  - Consider moving patient to room near nurses station  Outcome: Adequate for Discharge  Goal: Maintain or return to baseline ADL function  Description: INTERVENTIONS:  -  Assess patient's ability to carry out ADLs; assess patient's baseline for ADL function and identify physical deficits which impact ability to perform ADLs (bathing, care of mouth/teeth, toileting, grooming, dressing, etc.)  - Assess/evaluate cause of self-care deficits   - Assess range of motion  - Assess patient's mobility; develop plan if impaired  - Assess patient's need for assistive devices and provide as appropriate  - Encourage maximum independence but intervene and supervise when necessary  - Involve family in performance of ADLs  - Assess for home care needs following discharge   - Consider OT consult to assist with ADL evaluation and planning for discharge  - Provide patient education as appropriate  Outcome: Adequate for Discharge  Goal: Maintains/Returns to pre admission functional level  Description: INTERVENTIONS:  - Perform AM-PAC 6 Click Basic Mobility/ Daily Activity assessment daily.  - Set and communicate daily mobility goal to care team and patient/family/caregiver.   - Collaborate with rehabilitation services on mobility goals if consulted  - Perform Range of Motion  times a day.  - Reposition patient every  hours.  - Dangle patient  times a day  - Stand patient  times a day  - Ambulate patient  times a day  - Out of bed to chair  times a day   - Out of bed for meals  times a day  - Out of bed for toileting  - Record patient progress and toleration of activity level   Outcome: Adequate for Discharge     Problem: DISCHARGE PLANNING  Goal: Discharge to home or other facility with appropriate resources  Description:  INTERVENTIONS:  - Identify barriers to discharge w/patient and caregiver  - Arrange for needed discharge resources and transportation as appropriate  - Identify discharge learning needs (meds, wound care, etc.)  - Arrange for interpretive services to assist at discharge as needed  - Refer to Case Management Department for coordinating discharge planning if the patient needs post-hospital services based on physician/advanced practitioner order or complex needs related to functional status, cognitive ability, or social support system  Outcome: Adequate for Discharge     Problem: Knowledge Deficit  Goal: Patient/family/caregiver demonstrates understanding of disease process, treatment plan, medications, and discharge instructions  Description: Complete learning assessment and assess knowledge base.  Interventions:  - Provide teaching at level of understanding  - Provide teaching via preferred learning methods  Outcome: Adequate for Discharge     Problem: Prexisting or High Potential for Compromised Skin Integrity  Goal: Skin integrity is maintained or improved  Description: INTERVENTIONS:  - Identify patients at risk for skin breakdown  - Assess and monitor skin integrity  - Assess and monitor nutrition and hydration status  - Monitor labs   - Assess for incontinence   - Turn and reposition patient  - Assist with mobility/ambulation  - Relieve pressure over bony prominences  - Avoid friction and shearing  - Provide appropriate hygiene as needed including keeping skin clean and dry  - Evaluate need for skin moisturizer/barrier cream  - Collaborate with interdisciplinary team   - Patient/family teaching  - Consider wound care consult   Outcome: Adequate for Discharge

## 2024-07-22 NOTE — HOSPICE NOTE
Hospice referral received. Pt seen at bedside. Met with spouse Tarun and pts son and reviewed hospice care and services per Medicare Guidelines. All questions answered. They are in agreement with the same. Consents signed. Copies to spouse. OOH DNR to  for transport. Please give pt her pain med prior to transport. Request scripts for Roxanol and Ativan be sent with pt from [facility]

## 2024-07-22 NOTE — ASSESSMENT & PLAN NOTE
Patient is severely disabled given significant comorbid conditions and the presence of new and acute necrotizing fasciitis leading to sepsis  Continue comfort care goals

## 2024-07-22 NOTE — CASE MANAGEMENT
Case Management Assessment & Discharge Planning Note    Patient name Elsi Bo  Location W /W -01 MRN [id number]  : 1940 Date 2024       Current Admission Date: 2024  Current Admission Diagnosis:Necrotizing fasciitis (HCC)   Patient Active Problem List    Diagnosis Date Noted Date Diagnosed    Necrotizing fasciitis (HCC) 2024     End stage management 2024     Abnormal CT of the chest 2024     Closed fracture of ramus of right pubis (HCC) 2023     B12 deficiency 2023     Debility 2023     Protein malnutrition (HCC) 2023     Elevated creatine kinase 2023     Acetabulum fracture, right (HCC) 2023     Edema of left lower extremity 2023     Fall 2023     Weight loss 2023     Urinary retention 2023     Closed fracture of right wrist 2023     Sundowning 2023     Hypertension 2023     Constipation 2023     Thrombocytopenia (HCC) 2023     Hypokalemia 2023     Right renal mass 2023     Alzheimer's dementia with behavioral disturbance (HCC) 10/31/2023     Status post vaginal hysterectomy, left salpingectomy, anterior repair 2020     Second degree uterine prolapse 2019     [place] grade 2 cystocele 2019       LOS (days): 1  Geometric Mean LOS (GMLOS) (days):   Days to GMLOS:     OBJECTIVE:    Risk of Unplanned Readmission Score: 20.3         Current admission status: Inpatient       Preferred Pharmacy:   [facility] #[id number] - [place] - [address]  [address] [place] [address]  Phone: [phone] Fax: [phone]    [facility] - [place] - [address]  [address] [facility]  [address]  [place] [address]  Phone: [phone] Fax: [phone]    Primary Care Provider: Mauro Coronel MD    Primary Insurance: [employer]  Secondary Insurance:     ASSESSMENT:  Active Health Care Proxies    There  are no active Health Care Proxies on file.                 Readmission Root Cause  30 Day Readmission: No    Patient Information  Admitted from:: Facility  Mental Status: Confused  During Assessment patient was accompanied by: Not accompanied during assessment  Assessment information provided by:: Other - please comment (Facility)  Support Systems: Spouse/significant other, Children  Home entry access options. Select all that apply.: No steps to enter home  Type of Current Residence: Facility  Upon entering residence, is there a bedroom on the main floor (no further steps)?: Yes  Upon entering residence, is there a bathroom on the main floor (no further steps)?: Yes  Living Arrangements: Other (Comment) ([facility])    Activities of Daily Living Prior to Admission  Functional Status: Total dependent  Completes ADLs independently?: No  Level of ADL dependence: Total Dependent  Ambulates independently?: No  Level of ambulatory dependence: Total Dependent  Does patient use assisted devices?: No  Does patient currently own DME?: Yes  What DME does the patient currently own?: Walker, Wheelchair  Does patient have a history of Outpatient Therapy (PT/OT)?: No  Does the patient have a history of Short-Term Rehab?: Yes  Does patient have a history of HHC?: Yes  Does patient currently have HHC?: No         Patient Information Continued  Income Source: Pension/custodial  Does patient have prescription coverage?: Yes  Does patient receive dialysis treatments?: No  Does patient have a history of substance abuse?: No         Means of Transportation  Means of Transport to Appts::  (Seen at facility)      Social Determinants of Health (SDOH)      Flowsheet Row Most Recent Value   Housing Stability    In the last 12 months, was there a time when you were not able to pay the mortgage or rent on time? N   In the past 12 months, how many times have you moved where you were living? 1   At any time in the past 12 months, were you  homeless or living in a shelter (including now)? N   Transportation Needs    In the past 12 months, has lack of transportation kept you from medical appointments or from getting medications? no   In the past 12 months, has lack of transportation kept you from meetings, work, or from getting things needed for daily living? No   Food Insecurity    Within the past 12 months, you worried that your food would run out before you got the money to buy more. Never true   Within the past 12 months, the food you bought just didn't last and you didn't have money to get more. Never true   Utilities    In the past 12 months has the electric, gas, oil, or water company threatened to shut off services in your home? No            DISCHARGE DETAILS:    Discharge planning discussed with:: Patient's spouse  Freedom of Choice: Yes  Comments - Freedom of Choice: Patient is LTC resident at [facility]. Patient returning on hospice. Out of hospital DNR done. BLS ordered and confirmed. RN aware. [facility] aware  CM contacted family/caregiver?: Yes  Were Treatment Team discharge recommendations reviewed with patient/caregiver?: Yes  Did patient/caregiver verbalize understanding of patient care needs?: N/A- going to facility  Were patient/caregiver advised of the risks associated with not following Treatment Team discharge recommendations?: Yes    Contacts  Patient Contacts: Tarun  Relationship to Patient:: Family  Contact Method: In Person  Reason/Outcome: Discharge Planning              Other Referral/Resources/Interventions Provided:  Interventions: Hospice  Referral Comments: Patient returning to [facility] with [facility]         Treatment Team Recommendation: Hospice  Discharge Destination Plan:: Hospice

## 2024-07-22 NOTE — UTILIZATION REVIEW
Initial Clinical Review    Admission: Date/Time/Statement:   Admission Orders (From admission, onward)       Ordered        07/21/24 1601  INPATIENT ADMISSION  Once                          Orders Placed This Encounter   Procedures    INPATIENT ADMISSION     Standing Status:   Standing     Number of Occurrences:   1     Order Specific Question:   Level of Care     Answer:   Med Surg [16]     Order Specific Question:   Estimated length of stay     Answer:   More than 2 Midnights     Order Specific Question:   Certification     Answer:   I certify that inpatient services are medically necessary for this patient for a duration of greater than two midnights. See H&P and MD Progress Notes for additional information about the patient's course of treatment.     ED Arrival Information       Expected   -    Arrival   7/21/2024 09:02    Acuity   Emergent              Means of arrival   Ambulance    Escorted by   [facility]    Service   Hospitalist    Admission type   Emergency              Arrival complaint   UTI/AMS             Chief Complaint   Patient presents with    Possible UTI     [facility] Per ems pt was dx with UTI was put on Keflex for 5 days was not working was switched to Cipro 2 days ago, per  pt responsiveness  is baseline for her she is nonverbal        Initial Presentation: 83 y.o. female with a PMH of Alzheimer dementia with behavioral disturbances, non verbal.  presented to the ED from home via EMS w/ significant sacral ulcer..     In the ED, T 100.2, HR 94. WBC 24.93. procal 0.27. segmental neutrophils elevated at 88%. UA positive for UTI   CT abdomen significant for patchy soft tissue gas and mild debris with concern for necrotizing fasciitis/myositis. CT chest significant for bronchiolitis.     Given IV Rocephin, IV Vanco, IV Zosyn, 1L IVF bolus, IC Acetaminophen.   General surgery attempted bedside debridement of the wound but it was poorly tolerated by patient and did not offer  significant benefit. Patient's  decided to make patient comfort care only.     Admit as Inpatient for evaluation and treatment of necrotizing fascitis.  Plan: Fentanyl 2 mcg as needed for pain every 2 hours. Glycopyrrolate as needed secretions. 1mg Ativan as needed for agitation/anxiety. Consult placed to comfort care. Pleasure feedings. Holding at home medications except for Seroquel and gabapentin.  No antibiotics or fluids indicated given comfort care status.    Gen Surg Consult: sacral wound, are consistent with signs of infection with a likely NSTI.  A CT scan showed gas, debris, and soft tissue changes extending to the left greater trochanter, and bilateral gluteal muscles, worse on the right.   Wound debridement at bedside, diameter of the wound, measuring approximately 4 cm across. Dark sanguinous-purulent fluid was seen with no visible healthy tissue down to the level of the bone. The area was probed circumferentially and soft friable tissue was appreciated throughout the wound, with persistent drainage. At this time any further debridement was aborted, and the discussion was had with the patient's  that doing anything more would require the OR. Pt's  made the choice to stop any further intervention, and elected to proceed with comfort care. Given the patient's current functional status and advanced dementia, this decision was supported, and no further intervention was carried out.   Plan:  Comfort measures. Palliative care consult. LWC. No further surgical intervention per family request     Anticipated Length of Stay/Certification Statement: Patient will be admitted on an inpatient basis with an anticipated length of stay of greater than 2 midnights secondary to comfort care measures.     Date: 07/22   Day 2:  Per HELEN, Pt is a LTC resident of [facility]. She is returning to [facility]/ hospice.       ED Triage Vitals [07/21/24 0906]   Temperature Pulse Respirations Blood  Pressure SpO2 Pain Score   100.2 °F (37.9 °C) 94 16 138/73 93 % --     Weight (last 2 days) before discharge       Date/Time Weight    07/21/24 0906 54.2 (119.49)            Vital Signs (last 3 days) before discharge       Date/Time Temp Pulse Resp BP MAP (mmHg) SpO2 O2 Device Patient Position - Orthostatic VS Paty Coma Scale Score    07/22/24 1100 -- -- -- -- -- -- -- -- 7    07/22/24 0807 -- -- -- 104/55 69 -- -- -- --    07/21/24 20:08:34 98.8 °F (37.1 °C) 92 16 127/62 84 97 % None (Room air) Lying 8    07/21/24 1900 -- 80 16 111/53 77 95 % None (Room air) Sitting --    07/21/24 1700 -- 81 16 110/57 77 95 % None (Room air) Lying --    07/21/24 1458 -- 83 16 116/56 80 95 % None (Room air) Sitting --    07/21/24 1330 -- 81 16 114/68 85 95 % None (Room air) Lying --    07/21/24 1300 -- 73 16 105/54 78 95 % None (Room air) Lying --    07/21/24 1230 -- 74 16 112/56 80 95 % None (Room air) Lying --    07/21/24 1200 -- 74 16 112/54 78 95 % None (Room air) Lying --    07/21/24 1130 -- 76 16 114/53 77 94 % None (Room air) Sitting --    07/21/24 1100 -- 79 16 113/54 78 94 % None (Room air) Sitting 8    07/21/24 1030 -- 80 16 105/59 77 93 % None (Room air) -- --    07/21/24 0919 100.2 °F (37.9 °C) -- -- -- -- -- -- -- --    07/21/24 0906 100.2 °F (37.9 °C) 94 16 138/73 -- 93 % -- -- --              Pertinent Labs/Diagnostic Test Results:   Radiology:  CT abdomen pelvis with contrast   Final Interpretation by Srinivas Mcdaniel MD (07/21 7753)      1. Soft tissue defect posterior to the sacrum concerning for decubitus ulcer. Patchy soft tissue gas and mild debris in this region extending into the gluteal musculature bilaterally worse on the right raises concern for necrotizing fasciitis/myositis.   2. Infiltrative changes in the subcutaneous tissues superficial to the left greater trochanter with suggestion of a small fluid collection adjacent to the bone measuring 3.0 x 1.3 cm. Correlate for possible infection, small abscess  is not excluded.   3. New trace right pleural effusion. New patchy clustered groundglass opacities in the right middle lobe anteriorly with tree-in-bud opacities suggesting bronchiolitis.      The study was marked in EPIC for immediate notification.      Workstation performed: [id number]         XR chest 1 view portable   ED Interpretation by Jessenia Sumner MD (07/21 1012)   No acute findings. Independently interpreted by myself.      Final Interpretation by Hafsa Doyle MD (07/21 2253)      Trace right effusion and mild right base atelectasis on CT not visible.            Workstation performed: [id number]           Cardiology:  ECG 12 lead   Final Result by Geovani Garcia MD (07/21 2248)   Normal sinus rhythm   Nonspecific ST and T wave abnormality   Abnormal ECG   When compared with ECG of 21-JUL-2024 09:58, (unconfirmed)   Minimal criteria for Anterior infarct are no longer Present   ST now depressed in Lateral leads   T wave inversion now evident in Lateral leads   Confirmed by Geovani Garcia ([id number]) on 7/21/2024 10:48:32 PM      ECG 12 lead   Final Result by Geovani Garcia MD (07/21 2223)   Normal sinus rhythm   Low voltage QRS   Cannot rule out Anterior infarct , age undetermined   Abnormal ECG   When compared with ECG of 06-DEC-2023 11:53,   Minimal criteria for Anterior infarct are now Present   ST now depressed in Inferior leads   T wave inversion no longer evident in Inferior leads   Nonspecific T wave abnormality now evident in Anterior leads   Confirmed by Geovani Garcia ([id number]) on 7/21/2024 10:23:17 PM        GI:  No orders to display           Results from last 7 days   Lab Units 07/21/24  0913   WBC Thousand/uL 24.33*   HEMOGLOBIN g/dL 11.2*   HEMATOCRIT % 35.3   PLATELETS Thousands/uL 185   TOTAL NEUT ABS Thousands/µL 21.34*         Results from last 7 days   Lab Units 07/21/24  0913   SODIUM mmol/L 141   POTASSIUM mmol/L 3.8   CHLORIDE mmol/L 104   CO2 mmol/L 27   ANION GAP mmol/L 10    BUN mg/dL 29*   CREATININE mg/dL 0.90   EGFR ml/min/1.73sq m 60   CALCIUM mg/dL 8.9     Results from last 7 days   Lab Units 07/21/24  0913   AST U/L 12*   ALT U/L 10   ALK PHOS U/L 66   TOTAL PROTEIN g/dL 6.7   ALBUMIN g/dL 3.2*   TOTAL BILIRUBIN mg/dL 0.58         Results from last 7 days   Lab Units 07/21/24  0913   GLUCOSE RANDOM mg/dL 138             Results from last 7 days   Lab Units 07/21/24  0913   PROTIME seconds 15.9*   INR  1.20*   PTT seconds 31         Results from last 7 days   Lab Units 07/21/24  0913   PROCALCITONIN ng/ml 0.27*     Results from last 7 days   Lab Units 07/21/24  0913   LACTIC ACID mmol/L 1.5                 Results from last 7 days   Lab Units 07/21/24  1008   CLARITY UA  Turbid   COLOR UA  Yellow   SPEC GRAV UA  1.021   PH UA  5.5   GLUCOSE UA mg/dl Negative   KETONES UA mg/dl Negative   BLOOD UA  Trace*   PROTEIN UA mg/dl 50 (1+)*   NITRITE UA  Positive*   BILIRUBIN UA  Negative   UROBILINOGEN UA (BE) mg/dl <2.0   LEUKOCYTES UA  Small*   WBC UA /hpf 10-20*   RBC UA /hpf 1-2   BACTERIA UA /hpf Innumerable*   EPITHELIAL CELLS WET PREP /hpf Occasional                                 Results from last 7 days   Lab Units 07/21/24  1008 07/21/24  0913   BLOOD CULTURE   --  No Growth at 24 hrs.   GRAM STAIN RESULT   --  Gram positive cocci in pairs and chains*   URINE CULTURE  70,000-79,000 cfu/ml Gram Negative Sam Enteric Like*  --                    ED Treatment-Medication Administration from 07/21/2024 0902 to 07/21/2024 2003         Date/Time Order Dose Route Action     07/21/2024 1009 ceftriaxone (ROCEPHIN) 2 g/50 mL in dextrose IVPB 2,000 mg Intravenous New Bag     07/21/2024 1000 acetaminophen (Ofirmev) injection 1,000 mg 1,000 mg Intravenous New Bag     07/21/2024 1031 sodium chloride 0.9 % bolus 1,000 mL 1,000 mL Intravenous New Bag     07/21/2024 1046 iohexol (OMNIPAQUE) 350 MG/ML injection (MULTI-DOSE) 70 mL 70 mL Intravenous Given     07/21/2024 1450 vancomycin (VANCOCIN)  1,250 mg in sodium chloride 0.9 % 250 mL IVPB 1,250 mg Intravenous New Bag     07/21/2024 1340 piperacillin-tazobactam (ZOSYN) IVPB 4.5 g 4.5 g Intravenous New Bag            Past Medical History:   Diagnosis Date    Altered mental status 11/01/2023    Constipation 11/01/2023    Dementia (HCC)     Hypertension      Present on Admission:   Alzheimer's dementia with behavioral disturbance (HCC)   Hypertension   Debility   Protein malnutrition (HCC)      Admitting Diagnosis: Necrotizing fasciitis (HCC) [M72.6]  UTI (urinary tract infection) [N39.0]  Sacral decubitus ulcer [L89.159]  Alzheimer's dementia with behavioral disturbance (HCC) [G30.9, F02.818]  Dementia (HCC) [F03.90]  SIRS (systemic inflammatory response syndrome) (HCC) [R65.10]  Mild protein-calorie malnutrition (HCC) [E44.1]  Physical debility [R53.81]  Age/Sex: 83 y.o. female  Admission Orders:    Scheduled Medications:     Continuous IV Infusions: none     PRN Meds:  acetaminophen, 650 mg, Rectal, Q6H PRN  bisacodyl, 10 mg, Rectal, Daily PRN  fentaNYL, 25 mcg, Intravenous, Q2H PRN 07/22 x 1  glycopyrrolate, 0.1 mg, Intravenous, Q4H PRN  LORazepam, 1 mg, Intravenous, Q10 Min PRN        IP CONSULT TO HOSPICE  IP CONSULT TO ACUTE CARE SURGERY    Network Utilization Review Department  ATTENTION: Please call with any questions or concerns to [phone] and carefully listen to the prompts so that you are directed to the right person. All voicemails are confidential.   For Discharge needs, contact Care Management DC Support Team at [phone] opt. [phone]  Send all requests for admission clinical reviews, approved or denied determinations and any other requests to dedicated fax number below belonging to the campus where the patient is receiving treatment. List of dedicated fax numbers for the Facilities:  FACILITY NAME UR FAX NUMBER   ADMISSION DENIALS (Administrative/Medical Necessity) [phone]   DISCHARGE SUPPORT TEAM (NETWORK) [phone]   PARENT CHILD  [facility] (Maternity/NICU/Pediatrics) [phone]   [facility] [phone]   [facility] [phone]   [facility] [phone]   [facility] [phone]   [facility] [phone]   [facility] [phone]   [facility] [phone]   [facility] [phone]   [facility] [phone]   [facility] [phone]   [facility] [phone]   [facility] [phone]

## 2024-07-22 NOTE — ED ATTENDING ATTESTATION
7/16/2024  I, Malissa Merchant MD, saw and evaluated the patient. I have discussed the patient with the resident/non-physician practitioner and agree with the resident's/non-physician practitioner's findings, Plan of Care, and MDM as documented in the resident's/non-physician practitioner's note, except where noted. All available labs and Radiology studies were reviewed.  I was present for key portions of any procedure(s) performed by the resident/non-physician practitioner and I was immediately available to provide assistance.       At this point I agree with the current assessment done in the Emergency Department.  I have conducted an independent evaluation of this patient a history and physical is as follows:      84 yo female who is bed bound at baseline presents with  who is concerned about left hip deformity.  No h/o falls or trauma.  No change from baseline.  No systemic complaints including fever/pain behaviors/rash/bruising/new meds/missed meds or anything new or unusual.  On exam pt appears to be at baseline.  Able to range left hip without issues or limitations,  at bedside confirms nothing new or unusual about response to ROM.   No redness/crepitus/fluctuance, there is an area of asymmetry of the soft tissue just lateral to the left trochanter but not c/w infection, no ecchymosis.  During my evaluation  who initially noted the abnormality seems to think swelling appears improved.  Imaging without acute process.  Doubt fracture given lack of apparent pain with Rom or evidence of discomfort from patient or h/o trauma.  Doubt infection.  At this point no indication for arthrocentesis.   comfortable with return to nursing facility.  Knows that further follow may be necessary with orthopedics if issue persists or clinical picture changes.  RTER precautions discussed and documented on discharge paperwork, pt and family endorsed good understanding of reasons to return.         ED  Course  ED Course as of 07/22/24 0913   e Jul 16, 2024   1458 CT cervical spine without contrast  IMPRESSION:     No cervical spine fracture or traumatic malalignment.                 Workstation performed: [id number]           Exam Ended: 07/16/24 13:37 Last Resulted: 07/16/24 14:51              1458 CT head without contrast  IMPRESSION:     No acute intracranial abnormality.  Chronic microangiopathic changes.                 Workstation performed: [id number]           Exam Ended: 07/16/24 13:37 Last Resulted: 07/16/24 14:50              1552 XR hip/pelv 2-3 vws left if performed  MPRESSION:     No acute osseous abnormality.     Degenerative changes as described.     This report is in agreement with the preliminary interpretation.     Computerized Assisted Algorithm (CAA) may have been used to analyze all applicable images.           Workstation performed: [id number]           Exam Ended: 07/16/24 12:34           1552 XR chest portable  IMPRESSION:     No acute cardiopulmonary disease.     This report is in agreement with the preliminary interpretation.        Workstation performed: [id number]           Exam Ended: 07/16/24 12:53                 Critical Care Time  Procedures

## 2024-07-22 NOTE — DISCHARGE SUMMARY
[facility]  Discharge- Elsi Bo 1940, 83 y.o. female MRN: [id number]  Unit/Bed#: W -01 Encounter: [id number]  Primary Care Provider: Mauro Coronel MD   Date and time admitted to hospital: 7/21/2024  9:02 AM    Abnormal CT of the chest  Assessment & Plan  CT chest significant for bronchiolitis.    Comfort care measures    Debility  Assessment & Plan  Patient is severely disabled given significant comorbid conditions and the presence of new and acute necrotizing fasciitis leading to sepsis  Continue comfort care goals    Hypertension  Assessment & Plan  Hold at home blood pressure medications    Alzheimer's dementia with behavioral disturbance (HCC)  Assessment & Plan  Patient is nonverbal and has history of Alzheimer's dementia with behavioral disturbance.    Continue to monitor for any behavioral disturbances and treat with 1 mg Ativan accordingly  Continue at home Seroquel       * Necrotizing fasciitis (HCC)  Assessment & Plan  Patient is an 83-year-old female with a past medical history of Alzheimer's dementia with behavioral disturbance and is nonverbal, who presents with an inoperable sacral ulcer with necrotizing fasciitis.  Per general surgery, bedside debridement was unsuccessful and intolerable to the patient.  After conversation with patient's , the decision was made to make patient comfort care only.  CT abdomen significant for patchy soft tissue gas and mild debris with concern for necrotizing fasciitis/myositis  Patient has frequent high fevers   white blood cell count elevated at 24.33 with segmental neutrophils elevated at 88%  Pro-Brandin elevated at 0.27  UA positive for UTI  Patient meets criteria for sepsis    PLAN:  Inpatient medications held  Discharge back to [facility] on comfort measures  Medications on discharge include morphine oral solution and Ativan          Medical Problems       Resolved Problems  Date Reviewed:  7/22/2024   None       Discharging Resident: Shashi Zhao MD  Discharging Attending: No att. providers found  PCP: Mauro Coronel MD  Admission Date:   Admission Orders (From admission, onward)       Ordered        07/21/24 1601  INPATIENT ADMISSION  Once                          Discharge Date: 07/22/24    Consultations During Hospital Stay:  General surgery  Hospice services    Procedures Performed:   None    Significant Findings / Test Results:   XR chest 1 view portable    Result Date: 7/21/2024  Impression: Trace right effusion and mild right base atelectasis on CT not visible. Workstation performed: [id number]     CT abdomen pelvis with contrast    Result Date: 7/21/2024  Impression: 1. Soft tissue defect posterior to the sacrum concerning for decubitus ulcer. Patchy soft tissue gas and mild debris in this region extending into the gluteal musculature bilaterally worse on the right raises concern for necrotizing fasciitis/myositis. 2. Infiltrative changes in the subcutaneous tissues superficial to the left greater trochanter with suggestion of a small fluid collection adjacent to the bone measuring 3.0 x 1.3 cm. Correlate for possible infection, small abscess is not excluded. 3. New trace right pleural effusion. New patchy clustered groundglass opacities in the right middle lobe anteriorly with tree-in-bud opacities suggesting bronchiolitis. The study was marked in EPIC for immediate notification. Workstation performed: [id number]       XR chest 1 view portable    Result Date: 7/21/2024  Impression Trace right effusion and mild right base atelectasis on CT not visible. Workstation performed: [id number]         Incidental Findings:   CT abdomen pelvis with contrast: 1. Soft tissue defect posterior to the sacrum concerning for decubitus ulcer. Patchy soft tissue gas and mild debris in this region extending into the gluteal musculature bilaterally worse on the right raises concern for necrotizing  fasciitis/myositis., 2. Infiltrative changes in the subcutaneous tissues superficial to the left greater trochanter with suggestion of a small fluid collection adjacent to the bone measuring 3.0 x 1.3 cm. Correlate for possible infection, small abscess is not excluded., 3. New trace right pleural effusion. New patchy clustered groundglass opacities in the right middle lobe anteriorly with tree-in-bud opacities suggesting bronchiolitis., The study was marked in EPIC for immediate notification., Workstation performed: [id number]   I reviewed the above mentioned incidental findings with the patient and/or family and they expressed understanding.    Test Results Pending at Discharge (will require follow up):  None     Outpatient Tests Requested:  None    Complications: None    Reason for Admission: Necrotizing fasciitis    Hospital Course:   Elsi Bo is a 83 y.o. female patient who originally presented to the hospital on 7/21/2024 due to being sacral ulcer.  Presentation was suspicious for necrotizing fasciitis.  General surgery was consulted in the emergency department however patient was deemed to be a poor surgical candidate and unlikely to tolerate surgical intervention.  In light of this, patient's  made decision to transition the patient to comfort care measures only.  Home medications and antibiotics were discontinued.  Patient's hospital stay was uneventful and she was discharged back to nursing facility where [facility] hospice services would care for her there.        The patient, initially admitted to the hospital as inpatient, was discharged earlier than expected given the following: Comfort measures.    Please see above list of diagnoses and related plan for additional information.     Condition at Discharge: terminal    Discharge Day Visit / Exam:   Subjective: Unable to perform review of systems due to mental status  Vitals: Blood Pressure: 104/55 (07/22/24 0807)  Pulse: 92 (07/21/24  "2008)  Temperature: 98.8 °F (37.1 °C) (07/21/24 2008)  Temp Source: Axillary (07/21/24 2008)  Respirations: 16 (07/21/24 2008)  Height: 5' 5\" (165.1 cm) (07/21/24 0906)  Weight - Scale: 54.2 kg (119 lb 7.8 oz) (07/21/24 0906)  SpO2: 97 % (07/21/24 2008)  Exam:   Physical Exam  Vitals and nursing note reviewed.   Constitutional:       Appearance: She is well-developed. She is ill-appearing.   HENT:      Head: Normocephalic and atraumatic.   Eyes:      Conjunctiva/sclera: Conjunctivae normal.   Cardiovascular:      Rate and Rhythm: Normal rate and regular rhythm.      Heart sounds: No murmur heard.  Pulmonary:      Effort: Pulmonary effort is normal. No respiratory distress.      Breath sounds: Normal breath sounds.   Abdominal:      Palpations: Abdomen is soft.      Tenderness: There is no abdominal tenderness. There is guarding.   Musculoskeletal:         General: No swelling.      Cervical back: Neck supple.   Skin:     General: Skin is warm and dry.      Capillary Refill: Capillary refill takes less than 2 seconds.      Findings: Lesion (sacral decubitus ulcer, with dressing) present.   Neurological:      Mental Status: She is alert.   Psychiatric:         Mood and Affect: Mood normal.          Discussion with Family: Updated  () at bedside.    Discharge instructions/Information to patient and family:   See after visit summary for information provided to patient and family.      Provisions for Follow-Up Care:  See after visit summary for information related to follow-up care and any pertinent home health orders.      Mobility at time of Discharge:   Basic Mobility Inpatient Raw Score: 6  -HLM Goal: 2: Bed activities/Dependent transfer  -HLM Achieved: 1: Laying in bed    Comfort care     Disposition:   Other Skilled Nursing Facility at [facility]    Planned Readmission:     Discharge Medications:  See after visit summary for reconciled discharge medications provided to patient and/or family. "      **Please Note: This note may have been constructed using a voice recognition system**

## 2024-07-22 NOTE — PLAN OF CARE
Problem: PAIN - ADULT  Goal: Verbalizes/displays adequate comfort level or baseline comfort level  Description: Interventions:  - Encourage patient to monitor pain and request assistance  - Assess pain using appropriate pain scale  - Administer analgesics based on type and severity of pain and evaluate response  - Implement non-pharmacological measures as appropriate and evaluate response  - Consider cultural and social influences on pain and pain management  - Notify physician/advanced practitioner if interventions unsuccessful or patient reports new pain  Outcome: Progressing     Problem: INFECTION - ADULT  Goal: Absence or prevention of progression during hospitalization  Description: INTERVENTIONS:  - Assess and monitor for signs and symptoms of infection  - Monitor lab/diagnostic results  - Monitor all insertion sites, i.e. indwelling lines, tubes, and drains  - Monitor endotracheal if appropriate and nasal secretions for changes in amount and color  - [place] appropriate cooling/warming therapies per order  - Administer medications as ordered  - Instruct and encourage patient and family to use good hand hygiene technique  - Identify and instruct in appropriate isolation precautions for identified infection/condition  Outcome: Progressing  Goal: Absence of fever/infection during neutropenic period  Description: INTERVENTIONS:  - Monitor WBC    Outcome: Progressing     Problem: DISCHARGE PLANNING  Goal: Discharge to home or other facility with appropriate resources  Description: INTERVENTIONS:  - Identify barriers to discharge w/patient and caregiver  - Arrange for needed discharge resources and transportation as appropriate  - Identify discharge learning needs (meds, wound care, etc.)  - Arrange for interpretive services to assist at discharge as needed  - Refer to Case Management Department for coordinating discharge planning if the patient needs post-hospital services based on physician/advanced  practitioner order or complex needs related to functional status, cognitive ability, or social support system  Outcome: Progressing

## 2024-07-22 NOTE — ASSESSMENT & PLAN NOTE
Patient is an 83-year-old female with a past medical history of Alzheimer's dementia with behavioral disturbance and is nonverbal, who presents with an inoperable sacral ulcer with necrotizing fasciitis.  Per general surgery, bedside debridement was unsuccessful and intolerable to the patient.  After conversation with patient's , the decision was made to make patient comfort care only.  CT abdomen significant for patchy soft tissue gas and mild debris with concern for necrotizing fasciitis/myositis  Patient has frequent high fevers   white blood cell count elevated at 24.33 with segmental neutrophils elevated at 88%  Pro-Brandin elevated at 0.27  UA positive for UTI  Patient meets criteria for sepsis    PLAN:  Inpatient medications held  Discharge back to [facility] on comfort measures  Medications on discharge include morphine oral solution and Ativan

## 2024-07-26 NOTE — ED ATTENDING ATTESTATION
7/21/2024  I, Salvatore French DO, saw and evaluated the patient. I have discussed the patient with the resident/non-physician practitioner and agree with the resident's/non-physician practitioner's findings, Plan of Care, and MDM as documented in the resident's/non-physician practitioner's note, except where noted. All available labs and Radiology studies were reviewed.  I was present for key portions of any procedure(s) performed by the resident/non-physician practitioner and I was immediately available to provide assistance.       At this point I agree with the current assessment done in the Emergency Department.  I have conducted an independent evaluation of this patient a history and physical is as follows:    ED Course         Critical Care Time  Procedures

## 2024-07-28 ENCOUNTER — HOME CARE VISIT (OUTPATIENT)
Dept: HOME HOSPICE | Facility: HOSPICE | Age: 84
End: 2024-07-28
Payer: MEDICARE

## 2024-08-02 ENCOUNTER — HOME CARE VISIT (OUTPATIENT)
Dept: HOME HOSPICE | Facility: HOSPICE | Age: 84
End: 2024-08-02
Payer: MEDICARE

## 2024-08-02 NOTE — CASE COMMUNICATION
"Elsi Bo, Bereavement Final IDG [id number] FAMILY DECLINED BEREAVEMENT FOLLOW-UP   : 1940  SOC: 24  DOD: 24  Diagnosis: Dementia, Sepsis  Primary:  - Tarun Calzada was an 83 year old woman at [facility]. She and  of 57 years, Tarun, have 2 children and 3 grandchildren. 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 Jennifer gutierrez was Catholic. He reported having doubts and anger towards God. \"I don't understand why my wife was diagnosed with dementia. She was so devoted to the [employer] and the community\". He plans to donate $10,000 to local food [employer] to honor her. Bereavement was explained and offered to Tarun who declined, stating, \"I'll be alright\" and that he has his two children for support. Family declined bereavement follow-up.    TOD: Chaplain Edie reddy made a condolence call to Tarun who said how wonderful all the [facility] hospice staff were in caring for Elsi. He especially appreciated todd Shahid's attention to Elsi.  could hear Tarun smiling as he talked about Zehra's care for Elsi. Tarun was appreciative of phone call and states he and the family are doing well.     Call Assignments: Family Declined Bereavement Follow-Up.  "

## (undated) DEVICE — 1840 FOAM BLOCK NEEDLE COUNTER: Brand: DEVON

## (undated) DEVICE — TRAY FOLEY 16FR URIMETER SILICONE SURESTEP

## (undated) DEVICE — 3000CC GUARDIAN II: Brand: GUARDIAN

## (undated) DEVICE — 40595 XL TRENDELENBURG POSITIONING KIT: Brand: 40595 XL TRENDELENBURG POSITIONING KIT

## (undated) DEVICE — INTENDED FOR TISSUE SEPARATION, AND OTHER PROCEDURES THAT REQUIRE A SHARP SURGICAL BLADE TO PUNCTURE OR CUT.: Brand: BARD-PARKER SAFETY BLADES SIZE 15, STERILE

## (undated) DEVICE — PACKING VAGINAL 2 IN

## (undated) DEVICE — ENSEAL 20 CM SHAFT, LARGE JAW: Brand: ENSEAL X1

## (undated) DEVICE — CHLORHEXIDINE 4PCT 4 OZ

## (undated) DEVICE — ROSEBUD DISSECTORS: Brand: DEROYAL

## (undated) DEVICE — CURITY PLAIN PACKING STRIP: Brand: CURITY

## (undated) DEVICE — PACK PBDS MAJOR GYN VAGINAL SLB

## (undated) DEVICE — GLOVE PI ULTRA TOUCH SZ.7.5

## (undated) DEVICE — PREMIUM DRY TRAY LF: Brand: MEDLINE INDUSTRIES, INC.

## (undated) DEVICE — SUT PDS II 2-0 SH 27 IN Z317H

## (undated) DEVICE — SUT CHROMIC 2-0 CT-2 27 IN 883H

## (undated) DEVICE — SUT VICRYL PLUS 0 CTB-1 27 IN VCPB260H

## (undated) DEVICE — ALL PURPOSE SPONGES,NONWOVEN, 4 PLY: Brand: CURITY